# Patient Record
Sex: FEMALE | Race: WHITE | Employment: OTHER | URBAN - METROPOLITAN AREA
[De-identification: names, ages, dates, MRNs, and addresses within clinical notes are randomized per-mention and may not be internally consistent; named-entity substitution may affect disease eponyms.]

---

## 2017-02-18 ENCOUNTER — HOSPITAL ENCOUNTER (EMERGENCY)
Age: 72
Discharge: HOME | End: 2017-02-18
Attending: FAMILY MEDICINE
Payer: MEDICARE

## 2017-02-18 ENCOUNTER — APPOINTMENT (OUTPATIENT)
Dept: CT IMAGING | Age: 72
End: 2017-02-18
Payer: MEDICARE

## 2017-02-18 VITALS
WEIGHT: 200 LBS | HEIGHT: 66 IN | OXYGEN SATURATION: 97 % | SYSTOLIC BLOOD PRESSURE: 134 MMHG | TEMPERATURE: 98 F | HEART RATE: 96 BPM | DIASTOLIC BLOOD PRESSURE: 78 MMHG | RESPIRATION RATE: 20 BRPM | BODY MASS INDEX: 32.14 KG/M2

## 2017-02-18 DIAGNOSIS — K29.00 ACUTE GASTRITIS WITHOUT HEMORRHAGE, UNSPECIFIED GASTRITIS TYPE: Primary | ICD-10-CM

## 2017-02-18 LAB
ALBUMIN SERPL-MCNC: 4.4 G/DL (ref 3.9–4.9)
ALP BLD-CCNC: 119 U/L (ref 40–130)
ALT SERPL-CCNC: 30 U/L (ref 0–33)
ANION GAP SERPL CALCULATED.3IONS-SCNC: 14 MEQ/L (ref 7–13)
AST SERPL-CCNC: 35 U/L (ref 0–35)
BASOPHILS ABSOLUTE: 0 K/UL (ref 0–0.2)
BASOPHILS RELATIVE PERCENT: 0.6 %
BILIRUB SERPL-MCNC: 0.3 MG/DL (ref 0–1.2)
BUN BLDV-MCNC: 21 MG/DL (ref 8–23)
CALCIUM SERPL-MCNC: 9.4 MG/DL (ref 8.6–10.2)
CHLORIDE BLD-SCNC: 100 MEQ/L (ref 98–107)
CO2: 23 MEQ/L (ref 22–29)
CREAT SERPL-MCNC: 0.85 MG/DL (ref 0.5–0.9)
EOSINOPHILS ABSOLUTE: 0.1 K/UL (ref 0–0.7)
EOSINOPHILS RELATIVE PERCENT: 1.1 %
GFR AFRICAN AMERICAN: >60
GFR NON-AFRICAN AMERICAN: >60
GLOBULIN: 2.8 G/DL (ref 2.3–3.5)
GLUCOSE BLD-MCNC: 111 MG/DL (ref 74–109)
HCT VFR BLD CALC: 42.9 % (ref 37–47)
HEMOGLOBIN: 14.5 G/DL (ref 12–16)
LIPASE: 13 U/L (ref 13–60)
LYMPHOCYTES ABSOLUTE: 1.1 K/UL (ref 1–4.8)
LYMPHOCYTES RELATIVE PERCENT: 21.5 %
MCH RBC QN AUTO: 30.4 PG (ref 27–31.3)
MCHC RBC AUTO-ENTMCNC: 33.8 % (ref 33–37)
MCV RBC AUTO: 90 FL (ref 82–100)
MONOCYTES ABSOLUTE: 1 K/UL (ref 0.2–0.8)
MONOCYTES RELATIVE PERCENT: 19.6 %
NEUTROPHILS ABSOLUTE: 2.8 K/UL (ref 1.4–6.5)
NEUTROPHILS RELATIVE PERCENT: 57.2 %
PDW BLD-RTO: 13.2 % (ref 11.5–14.5)
PLATELET # BLD: 180 K/UL (ref 130–400)
POTASSIUM SERPL-SCNC: 3.6 MEQ/L (ref 3.5–5.1)
RBC # BLD: 4.77 M/UL (ref 4.2–5.4)
SODIUM BLD-SCNC: 137 MEQ/L (ref 132–144)
TOTAL PROTEIN: 7.2 G/DL (ref 6.4–8.1)
WBC # BLD: 5 K/UL (ref 4.8–10.8)

## 2017-02-18 PROCEDURE — 93005 ELECTROCARDIOGRAM TRACING: CPT

## 2017-02-18 PROCEDURE — 71275 CT ANGIOGRAPHY CHEST: CPT

## 2017-02-18 PROCEDURE — 6370000000 HC RX 637 (ALT 250 FOR IP): Performed by: FAMILY MEDICINE

## 2017-02-18 PROCEDURE — 80053 COMPREHEN METABOLIC PANEL: CPT

## 2017-02-18 PROCEDURE — 96374 THER/PROPH/DIAG INJ IV PUSH: CPT

## 2017-02-18 PROCEDURE — 83690 ASSAY OF LIPASE: CPT

## 2017-02-18 PROCEDURE — 36415 COLL VENOUS BLD VENIPUNCTURE: CPT

## 2017-02-18 PROCEDURE — 6360000002 HC RX W HCPCS: Performed by: FAMILY MEDICINE

## 2017-02-18 PROCEDURE — 6360000004 HC RX CONTRAST MEDICATION: Performed by: RADIOLOGY

## 2017-02-18 PROCEDURE — 2580000003 HC RX 258: Performed by: FAMILY MEDICINE

## 2017-02-18 PROCEDURE — 99284 EMERGENCY DEPT VISIT MOD MDM: CPT

## 2017-02-18 PROCEDURE — 85025 COMPLETE CBC W/AUTO DIFF WBC: CPT

## 2017-02-18 RX ORDER — MECLIZINE HYDROCHLORIDE 25 MG/1
25 TABLET ORAL 3 TIMES DAILY PRN
COMMUNITY

## 2017-02-18 RX ORDER — SIMVASTATIN 40 MG
40 TABLET ORAL NIGHTLY
COMMUNITY
End: 2018-05-29 | Stop reason: ALTCHOICE

## 2017-02-18 RX ORDER — ASPIRIN 81 MG/1
81 TABLET ORAL DAILY
COMMUNITY

## 2017-02-18 RX ORDER — ATENOLOL 25 MG/1
25 TABLET ORAL DAILY
COMMUNITY

## 2017-02-18 RX ORDER — ONDANSETRON 2 MG/ML
4 INJECTION INTRAMUSCULAR; INTRAVENOUS ONCE
Status: COMPLETED | OUTPATIENT
Start: 2017-02-18 | End: 2017-02-18

## 2017-02-18 RX ORDER — PANTOPRAZOLE SODIUM 40 MG/1
40 TABLET, DELAYED RELEASE ORAL ONCE
Status: COMPLETED | OUTPATIENT
Start: 2017-02-18 | End: 2017-02-18

## 2017-02-18 RX ORDER — ONDANSETRON 4 MG/1
4 TABLET, FILM COATED ORAL EVERY 8 HOURS PRN
Qty: 20 TABLET | Refills: 0 | Status: SHIPPED | OUTPATIENT
Start: 2017-02-18

## 2017-02-18 RX ORDER — TRIAMTERENE AND HYDROCHLOROTHIAZIDE 37.5; 25 MG/1; MG/1
1 TABLET ORAL PRN
COMMUNITY

## 2017-02-18 RX ORDER — 0.9 % SODIUM CHLORIDE 0.9 %
1000 INTRAVENOUS SOLUTION INTRAVENOUS ONCE
Status: COMPLETED | OUTPATIENT
Start: 2017-02-18 | End: 2017-02-18

## 2017-02-18 RX ADMIN — PANTOPRAZOLE SODIUM 40 MG: 40 TABLET, DELAYED RELEASE ORAL at 12:13

## 2017-02-18 RX ADMIN — SODIUM CHLORIDE 1000 ML: 9 INJECTION, SOLUTION INTRAVENOUS at 12:13

## 2017-02-18 RX ADMIN — ONDANSETRON 4 MG: 2 INJECTION, SOLUTION INTRAMUSCULAR; INTRAVENOUS at 12:13

## 2017-02-18 RX ADMIN — IOPAMIDOL 100 ML: 612 INJECTION, SOLUTION INTRAVENOUS at 13:40

## 2017-02-18 RX ADMIN — Medication 30 ML: at 13:11

## 2017-02-20 LAB
EKG ATRIAL RATE: 101 BPM
EKG P AXIS: 46 DEGREES
EKG P-R INTERVAL: 138 MS
EKG Q-T INTERVAL: 352 MS
EKG QRS DURATION: 78 MS
EKG QTC CALCULATION (BAZETT): 456 MS
EKG R AXIS: -8 DEGREES
EKG T AXIS: 76 DEGREES
EKG VENTRICULAR RATE: 101 BPM

## 2017-06-06 ENCOUNTER — HOSPITAL ENCOUNTER (OUTPATIENT)
Dept: WOMENS IMAGING | Age: 72
Discharge: HOME | End: 2017-06-06
Payer: MEDICARE

## 2017-06-06 DIAGNOSIS — Z13.9 SCREENING: ICD-10-CM

## 2017-06-06 PROCEDURE — 77063 BREAST TOMOSYNTHESIS BI: CPT

## 2017-06-23 ENCOUNTER — HOSPITAL ENCOUNTER (OUTPATIENT)
Dept: CT IMAGING | Age: 72
Discharge: HOME | End: 2017-06-23
Payer: MEDICARE

## 2017-06-23 VITALS
HEART RATE: 99 BPM | DIASTOLIC BLOOD PRESSURE: 79 MMHG | SYSTOLIC BLOOD PRESSURE: 136 MMHG | HEIGHT: 66 IN | BODY MASS INDEX: 33.91 KG/M2 | WEIGHT: 211 LBS | RESPIRATION RATE: 16 BRPM

## 2017-06-23 DIAGNOSIS — R04.2 HEMOPTYSIS: ICD-10-CM

## 2017-06-23 DIAGNOSIS — R06.02 SOB (SHORTNESS OF BREATH): ICD-10-CM

## 2017-06-23 DIAGNOSIS — R07.9 CHEST PAIN, UNSPECIFIED TYPE: ICD-10-CM

## 2017-06-23 PROCEDURE — 71250 CT THORAX DX C-: CPT

## 2017-08-15 ENCOUNTER — OFFICE VISIT (OUTPATIENT)
Dept: PULMONOLOGY | Age: 72
End: 2017-08-15

## 2017-08-15 VITALS
DIASTOLIC BLOOD PRESSURE: 76 MMHG | TEMPERATURE: 98.1 F | RESPIRATION RATE: 14 BRPM | WEIGHT: 215 LBS | HEART RATE: 91 BPM | BODY MASS INDEX: 34.55 KG/M2 | OXYGEN SATURATION: 97 % | SYSTOLIC BLOOD PRESSURE: 128 MMHG | HEIGHT: 66 IN

## 2017-08-15 DIAGNOSIS — G47.33 OBSTRUCTIVE SLEEP APNEA: Primary | ICD-10-CM

## 2017-08-15 DIAGNOSIS — R06.02 SHORTNESS OF BREATH: ICD-10-CM

## 2017-08-15 DIAGNOSIS — K21.9 GASTROESOPHAGEAL REFLUX DISEASE, ESOPHAGITIS PRESENCE NOT SPECIFIED: ICD-10-CM

## 2017-08-15 PROCEDURE — 99204 OFFICE O/P NEW MOD 45 MIN: CPT | Performed by: INTERNAL MEDICINE

## 2017-08-15 RX ORDER — ALPRAZOLAM 0.25 MG/1
0.25 TABLET ORAL 2 TIMES DAILY PRN
COMMUNITY
Start: 2017-07-14

## 2017-08-15 RX ORDER — NITROGLYCERIN 0.4 MG/1
TABLET SUBLINGUAL
COMMUNITY
Start: 2017-05-31

## 2017-08-15 RX ORDER — RANITIDINE 300 MG/1
300 TABLET ORAL NIGHTLY
Qty: 30 TABLET | Refills: 5 | Status: SHIPPED | OUTPATIENT
Start: 2017-08-15 | End: 2017-08-15 | Stop reason: SDUPTHER

## 2017-08-15 RX ORDER — OMEPRAZOLE 20 MG/1
20 CAPSULE, DELAYED RELEASE ORAL DAILY
COMMUNITY
Start: 2017-06-01

## 2017-08-15 RX ORDER — ESCITALOPRAM OXALATE 10 MG/1
10 TABLET ORAL DAILY
COMMUNITY
Start: 2017-07-14

## 2017-08-15 RX ORDER — RANITIDINE 300 MG/1
300 TABLET ORAL NIGHTLY
Qty: 30 TABLET | Refills: 5 | Status: SHIPPED | OUTPATIENT
Start: 2017-08-15 | End: 2017-12-10 | Stop reason: SDUPTHER

## 2017-08-15 ASSESSMENT — ENCOUNTER SYMPTOMS
SINUS PRESSURE: 0
WHEEZING: 0
ABDOMINAL PAIN: 0
VOMITING: 0
NAUSEA: 1
COUGH: 0
SHORTNESS OF BREATH: 1
SORE THROAT: 0
CHEST TIGHTNESS: 0
DIARRHEA: 0
RHINORRHEA: 0

## 2017-08-29 ENCOUNTER — HOSPITAL ENCOUNTER (OUTPATIENT)
Dept: PULMONOLOGY | Age: 72
Discharge: HOME | End: 2017-08-29
Payer: MEDICARE

## 2017-08-29 DIAGNOSIS — R06.02 SHORTNESS OF BREATH: ICD-10-CM

## 2017-08-29 PROCEDURE — 6360000002 HC RX W HCPCS: Performed by: INTERNAL MEDICINE

## 2017-08-29 PROCEDURE — 94060 EVALUATION OF WHEEZING: CPT

## 2017-08-29 PROCEDURE — 94729 DIFFUSING CAPACITY: CPT

## 2017-08-29 PROCEDURE — 94726 PLETHYSMOGRAPHY LUNG VOLUMES: CPT

## 2017-08-29 RX ADMIN — ALBUTEROL SULFATE 2.5 MG: 2.5 SOLUTION RESPIRATORY (INHALATION) at 15:41

## 2017-08-30 RX ORDER — ALBUTEROL SULFATE 2.5 MG/3ML
2.5 SOLUTION RESPIRATORY (INHALATION) ONCE
Status: COMPLETED | OUTPATIENT
Start: 2017-08-30 | End: 2017-08-29

## 2017-09-01 ENCOUNTER — HOSPITAL ENCOUNTER (OUTPATIENT)
Dept: SLEEP CENTER | Age: 72
Discharge: HOME | End: 2017-09-01
Payer: MEDICARE

## 2017-09-01 PROCEDURE — 95810 POLYSOM 6/> YRS 4/> PARAM: CPT

## 2017-09-05 PROCEDURE — 94726 PLETHYSMOGRAPHY LUNG VOLUMES: CPT | Performed by: INTERNAL MEDICINE

## 2017-09-05 PROCEDURE — 94060 EVALUATION OF WHEEZING: CPT | Performed by: INTERNAL MEDICINE

## 2017-09-05 PROCEDURE — 94729 DIFFUSING CAPACITY: CPT | Performed by: INTERNAL MEDICINE

## 2017-09-14 DIAGNOSIS — G47.33 OBSTRUCTIVE SLEEP APNEA: ICD-10-CM

## 2017-09-15 ENCOUNTER — HOSPITAL ENCOUNTER (OUTPATIENT)
Dept: SLEEP CENTER | Age: 72
Discharge: HOME | End: 2017-09-15
Payer: MEDICARE

## 2017-09-15 PROCEDURE — 95811 POLYSOM 6/>YRS CPAP 4/> PARM: CPT

## 2017-09-28 ENCOUNTER — OFFICE VISIT (OUTPATIENT)
Dept: PULMONOLOGY | Age: 72
End: 2017-09-28

## 2017-09-28 VITALS
HEART RATE: 80 BPM | DIASTOLIC BLOOD PRESSURE: 88 MMHG | WEIGHT: 215 LBS | TEMPERATURE: 97.6 F | HEIGHT: 66 IN | OXYGEN SATURATION: 98 % | BODY MASS INDEX: 34.55 KG/M2 | RESPIRATION RATE: 18 BRPM | SYSTOLIC BLOOD PRESSURE: 134 MMHG

## 2017-09-28 DIAGNOSIS — G47.33 OBSTRUCTIVE SLEEP APNEA: Primary | ICD-10-CM

## 2017-09-28 DIAGNOSIS — K21.9 GASTROESOPHAGEAL REFLUX DISEASE, ESOPHAGITIS PRESENCE NOT SPECIFIED: ICD-10-CM

## 2017-09-28 PROCEDURE — G8399 PT W/DXA RESULTS DOCUMENT: HCPCS | Performed by: INTERNAL MEDICINE

## 2017-09-28 PROCEDURE — 1123F ACP DISCUSS/DSCN MKR DOCD: CPT | Performed by: INTERNAL MEDICINE

## 2017-09-28 PROCEDURE — 4040F PNEUMOC VAC/ADMIN/RCVD: CPT | Performed by: INTERNAL MEDICINE

## 2017-09-28 PROCEDURE — 99213 OFFICE O/P EST LOW 20 MIN: CPT | Performed by: INTERNAL MEDICINE

## 2017-09-28 PROCEDURE — G8417 CALC BMI ABV UP PARAM F/U: HCPCS | Performed by: INTERNAL MEDICINE

## 2017-09-28 PROCEDURE — G8427 DOCREV CUR MEDS BY ELIG CLIN: HCPCS | Performed by: INTERNAL MEDICINE

## 2017-09-28 PROCEDURE — 1036F TOBACCO NON-USER: CPT | Performed by: INTERNAL MEDICINE

## 2017-09-28 PROCEDURE — 3014F SCREEN MAMMO DOC REV: CPT | Performed by: INTERNAL MEDICINE

## 2017-09-28 PROCEDURE — 1090F PRES/ABSN URINE INCON ASSESS: CPT | Performed by: INTERNAL MEDICINE

## 2017-09-28 PROCEDURE — 3017F COLORECTAL CA SCREEN DOC REV: CPT | Performed by: INTERNAL MEDICINE

## 2017-09-28 ASSESSMENT — ENCOUNTER SYMPTOMS
COUGH: 0
WHEEZING: 0
SINUS PRESSURE: 0
VOMITING: 0
ABDOMINAL PAIN: 0
RHINORRHEA: 0
SHORTNESS OF BREATH: 1
DIARRHEA: 0
NAUSEA: 0
CHEST TIGHTNESS: 1
SORE THROAT: 0

## 2017-09-28 NOTE — PROGRESS NOTES
pain, palpitations and leg swelling. Gastrointestinal: Negative for abdominal pain, diarrhea, nausea and vomiting. Genitourinary: Negative for dysuria and urgency. Musculoskeletal: Negative for arthralgias, joint swelling and myalgias. Skin: Negative for rash. Allergic/Immunologic: Negative for environmental allergies. Neurological: Negative for tremors, weakness, light-headedness and headaches. Psychiatric/Behavioral: Negative for behavioral problems and sleep disturbance. Objective:     Vitals:    09/28/17 1531   BP: 134/88   Site: Right Arm   Position: Sitting   Cuff Size: Medium Adult   Pulse: 80   Resp: 18   Temp: 97.6 °F (36.4 °C)   TempSrc: Tympanic   SpO2: 98%   Weight: 215 lb (97.5 kg)   Height: 5' 5.5\" (1.664 m)         Physical Exam   Constitutional: She is oriented to person, place, and time. She appears well-developed and well-nourished. HENT:   Head: Normocephalic and atraumatic. Mouth/Throat: Oropharynx is clear and moist.   Eyes: EOM are normal. Pupils are equal, round, and reactive to light. Neck: No JVD present. No tracheal deviation present. No thyromegaly present. Cardiovascular: Normal rate and regular rhythm. Exam reveals no gallop. No murmur heard. Pulmonary/Chest: She has no wheezes. She has no rales. She exhibits no tenderness. Abdominal: She exhibits no distension. Musculoskeletal: Normal range of motion. She exhibits no edema. Neurological: She is alert and oriented to person, place, and time. Coordination normal.   Skin: Skin is warm and dry. No rash noted. Psychiatric: She has a normal mood and affect. Assessment:     1. Obstructive sleep apnea     2. Gastroesophageal reflux disease, esophagitis presence not specified       The results of the study were discussed with patient today at length.   She was encouraged to start using her CPAP regularly, work on losing some weight and report any difficulties to me otherwise I'll see her for follow-up in 2 months      Plan:     No orders of the defined types were placed in this encounter. No orders of the defined types were placed in this encounter. Return in about 2 months (around 11/28/2017) for after starting CPAP.       Bowen Cochran MD

## 2017-12-04 ENCOUNTER — OFFICE VISIT (OUTPATIENT)
Dept: PULMONOLOGY | Age: 72
End: 2017-12-04

## 2017-12-04 VITALS
SYSTOLIC BLOOD PRESSURE: 128 MMHG | HEIGHT: 66 IN | OXYGEN SATURATION: 98 % | WEIGHT: 218.6 LBS | HEART RATE: 71 BPM | TEMPERATURE: 96.9 F | BODY MASS INDEX: 35.13 KG/M2 | DIASTOLIC BLOOD PRESSURE: 82 MMHG

## 2017-12-04 DIAGNOSIS — G47.33 OBSTRUCTIVE SLEEP APNEA: Primary | ICD-10-CM

## 2017-12-04 DIAGNOSIS — K21.9 GASTROESOPHAGEAL REFLUX DISEASE, ESOPHAGITIS PRESENCE NOT SPECIFIED: ICD-10-CM

## 2017-12-04 PROCEDURE — G8417 CALC BMI ABV UP PARAM F/U: HCPCS | Performed by: INTERNAL MEDICINE

## 2017-12-04 PROCEDURE — G8427 DOCREV CUR MEDS BY ELIG CLIN: HCPCS | Performed by: INTERNAL MEDICINE

## 2017-12-04 PROCEDURE — 4040F PNEUMOC VAC/ADMIN/RCVD: CPT | Performed by: INTERNAL MEDICINE

## 2017-12-04 PROCEDURE — 1123F ACP DISCUSS/DSCN MKR DOCD: CPT | Performed by: INTERNAL MEDICINE

## 2017-12-04 PROCEDURE — G8484 FLU IMMUNIZE NO ADMIN: HCPCS | Performed by: INTERNAL MEDICINE

## 2017-12-04 PROCEDURE — 3017F COLORECTAL CA SCREEN DOC REV: CPT | Performed by: INTERNAL MEDICINE

## 2017-12-04 PROCEDURE — G8399 PT W/DXA RESULTS DOCUMENT: HCPCS | Performed by: INTERNAL MEDICINE

## 2017-12-04 PROCEDURE — 3014F SCREEN MAMMO DOC REV: CPT | Performed by: INTERNAL MEDICINE

## 2017-12-04 PROCEDURE — 99213 OFFICE O/P EST LOW 20 MIN: CPT | Performed by: INTERNAL MEDICINE

## 2017-12-04 PROCEDURE — 1036F TOBACCO NON-USER: CPT | Performed by: INTERNAL MEDICINE

## 2017-12-04 PROCEDURE — 1090F PRES/ABSN URINE INCON ASSESS: CPT | Performed by: INTERNAL MEDICINE

## 2017-12-04 ASSESSMENT — ENCOUNTER SYMPTOMS
NAUSEA: 0
COUGH: 0
WHEEZING: 0
ABDOMINAL PAIN: 0
SORE THROAT: 0
RHINORRHEA: 0
SHORTNESS OF BREATH: 0
CHEST TIGHTNESS: 0
SINUS PRESSURE: 0
VOMITING: 0
DIARRHEA: 0

## 2017-12-04 NOTE — PROGRESS NOTES
Subjective:     Geetha Campos is a 67 y.o. female who complains today of:     Chief Complaint   Patient presents with    Sleep Apnea     2 month follow up       HPI  Patient is here for a follow-up visit regarding obstructive sleep apnea and GERD. Since the last visit patient reports no significant issues or problems she is doing very well using her CPAP regularly she states that she did not have much trouble getting used to it. She is sleeping through the night averaging 6-7 hours of sleep now. She reports no significant cough or increased shortness breath lately. Allergies:  Review of patient's allergies indicates no known allergies. Past Medical History:   Diagnosis Date    Hyperlipidemia     Meniere's disease     CARLOS (obstructive sleep apnea)      Past Surgical History:   Procedure Laterality Date    BREAST LUMPECTOMY      CHOLECYSTECTOMY      COLONOSCOPY  12/21/15    w/polypectomy     HYSTERECTOMY       Family History   Problem Relation Age of Onset    Brain Cancer Father     Colon Cancer Maternal Grandfather      Social History     Social History    Marital status:      Spouse name: N/A    Number of children: N/A    Years of education: N/A     Occupational History    Not on file. Social History Main Topics    Smoking status: Never Smoker    Smokeless tobacco: Never Used    Alcohol use No    Drug use: No    Sexual activity: Not on file     Other Topics Concern    Not on file     Social History Narrative    No narrative on file         Review of Systems   Constitutional: Negative for appetite change, chills, diaphoresis, fatigue and fever. HENT: Negative for congestion, nosebleeds, postnasal drip, rhinorrhea, sinus pressure, sneezing and sore throat. Eyes: Negative for visual disturbance. Respiratory: Negative for cough, chest tightness, shortness of breath and wheezing. Cardiovascular: Negative for chest pain, palpitations and leg swelling. Gastrointestinal: Negative for abdominal pain, diarrhea, nausea and vomiting. Genitourinary: Negative for dysuria and urgency. Musculoskeletal: Negative for arthralgias, joint swelling and myalgias. Skin: Negative for rash. Allergic/Immunologic: Negative for environmental allergies. Neurological: Negative for tremors, weakness, light-headedness and headaches. Psychiatric/Behavioral: Negative for behavioral problems and sleep disturbance. Objective:     Vitals:    12/04/17 1349   BP: 128/82   Site: Right Arm   Position: Sitting   Cuff Size: Large Adult   Pulse: 71   Temp: 96.9 °F (36.1 °C)   TempSrc: Tympanic   SpO2: 98%   Weight: 218 lb 9.6 oz (99.2 kg)   Height: 5' 6\" (1.676 m)         Physical Exam   Constitutional: She is oriented to person, place, and time. She appears well-developed and well-nourished. HENT:   Head: Normocephalic and atraumatic. Mouth/Throat: Oropharynx is clear and moist.   Eyes: EOM are normal. Pupils are equal, round, and reactive to light. Neck: No JVD present. No tracheal deviation present. No thyromegaly present. Cardiovascular: Normal rate and regular rhythm. Exam reveals no gallop. No murmur heard. Pulmonary/Chest: She has no wheezes. She has no rales. She exhibits no tenderness. Abdominal: She exhibits no distension. Musculoskeletal: Normal range of motion. She exhibits no edema. Neurological: She is alert and oriented to person, place, and time. Coordination normal.   Skin: Skin is warm and dry. No rash noted. Psychiatric: She has a normal mood and affect. Assessment:     1. Obstructive sleep apnea     2.  Gastroesophageal reflux disease, esophagitis presence not specified       The diagnosis of sleep apnea, the importance of weight loss and wasting help her lose weight were discussed today at length in addition she was advised to continue regular use of CPAP and I'll see her for follow-up in 1 year and as needed      Plan:     No

## 2017-12-10 DIAGNOSIS — K21.9 GASTROESOPHAGEAL REFLUX DISEASE, ESOPHAGITIS PRESENCE NOT SPECIFIED: ICD-10-CM

## 2017-12-11 RX ORDER — RANITIDINE 300 MG/1
TABLET ORAL
Qty: 90 TABLET | Refills: 5 | Status: SHIPPED | OUTPATIENT
Start: 2017-12-11 | End: 2019-07-09 | Stop reason: SDUPTHER

## 2018-03-06 ENCOUNTER — HOSPITAL ENCOUNTER (EMERGENCY)
Age: 73
Discharge: HOME | End: 2018-03-06
Attending: EMERGENCY MEDICINE
Payer: MEDICARE

## 2018-03-06 ENCOUNTER — APPOINTMENT (OUTPATIENT)
Dept: CT IMAGING | Age: 73
End: 2018-03-06
Payer: MEDICARE

## 2018-03-06 VITALS
RESPIRATION RATE: 17 BRPM | SYSTOLIC BLOOD PRESSURE: 143 MMHG | WEIGHT: 210 LBS | DIASTOLIC BLOOD PRESSURE: 82 MMHG | HEIGHT: 66 IN | HEART RATE: 70 BPM | TEMPERATURE: 97.8 F | BODY MASS INDEX: 33.75 KG/M2 | OXYGEN SATURATION: 95 %

## 2018-03-06 DIAGNOSIS — R06.00 DYSPNEA, UNSPECIFIED TYPE: Primary | ICD-10-CM

## 2018-03-06 DIAGNOSIS — I27.20 PULMONARY HYPERTENSION (HCC): ICD-10-CM

## 2018-03-06 LAB
ALBUMIN SERPL-MCNC: 4.2 G/DL (ref 3.9–4.9)
ALP BLD-CCNC: 108 U/L (ref 40–130)
ALT SERPL-CCNC: 17 U/L (ref 0–33)
ANION GAP SERPL CALCULATED.3IONS-SCNC: 13 MEQ/L (ref 7–13)
AST SERPL-CCNC: 17 U/L (ref 0–35)
BASOPHILS ABSOLUTE: 0.1 K/UL (ref 0–0.2)
BASOPHILS RELATIVE PERCENT: 1 %
BILIRUB SERPL-MCNC: 0.3 MG/DL (ref 0–1.2)
BUN BLDV-MCNC: 22 MG/DL (ref 8–23)
CALCIUM SERPL-MCNC: 8.8 MG/DL (ref 8.6–10.2)
CHLORIDE BLD-SCNC: 103 MEQ/L (ref 98–107)
CO2: 23 MEQ/L (ref 22–29)
CREAT SERPL-MCNC: 0.58 MG/DL (ref 0.5–0.9)
EKG ATRIAL RATE: 87 BPM
EKG P AXIS: 29 DEGREES
EKG P-R INTERVAL: 150 MS
EKG Q-T INTERVAL: 372 MS
EKG QRS DURATION: 74 MS
EKG QTC CALCULATION (BAZETT): 447 MS
EKG R AXIS: -4 DEGREES
EKG T AXIS: 54 DEGREES
EKG VENTRICULAR RATE: 87 BPM
EOSINOPHILS ABSOLUTE: 0 K/UL (ref 0–0.7)
EOSINOPHILS RELATIVE PERCENT: 0.9 %
GFR AFRICAN AMERICAN: >60
GFR NON-AFRICAN AMERICAN: >60
GLOBULIN: 2.3 G/DL (ref 2.3–3.5)
GLUCOSE BLD-MCNC: 101 MG/DL (ref 74–109)
HCT VFR BLD CALC: 40.6 % (ref 37–47)
HEMOGLOBIN: 13.5 G/DL (ref 12–16)
LYMPHOCYTES ABSOLUTE: 1.5 K/UL (ref 1–4.8)
LYMPHOCYTES RELATIVE PERCENT: 25.9 %
MAGNESIUM: 2.1 MG/DL (ref 1.7–2.3)
MCH RBC QN AUTO: 30.2 PG (ref 27–31.3)
MCHC RBC AUTO-ENTMCNC: 33.3 % (ref 33–37)
MCV RBC AUTO: 90.8 FL (ref 82–100)
MONOCYTES ABSOLUTE: 0.6 K/UL (ref 0.2–0.8)
MONOCYTES RELATIVE PERCENT: 10 %
NEUTROPHILS ABSOLUTE: 3.5 K/UL (ref 1.4–6.5)
NEUTROPHILS RELATIVE PERCENT: 62.2 %
PDW BLD-RTO: 14 % (ref 11.5–14.5)
PLATELET # BLD: 212 K/UL (ref 130–400)
POTASSIUM SERPL-SCNC: 4.2 MEQ/L (ref 3.5–5.1)
RBC # BLD: 4.47 M/UL (ref 4.2–5.4)
SODIUM BLD-SCNC: 139 MEQ/L (ref 132–144)
TOTAL PROTEIN: 6.5 G/DL (ref 6.4–8.1)
TROPONIN: <0.01 NG/ML (ref 0–0.01)
WBC # BLD: 5.6 K/UL (ref 4.8–10.8)

## 2018-03-06 PROCEDURE — 83735 ASSAY OF MAGNESIUM: CPT

## 2018-03-06 PROCEDURE — 99285 EMERGENCY DEPT VISIT HI MDM: CPT

## 2018-03-06 PROCEDURE — 6360000004 HC RX CONTRAST MEDICATION: Performed by: EMERGENCY MEDICINE

## 2018-03-06 PROCEDURE — 80053 COMPREHEN METABOLIC PANEL: CPT

## 2018-03-06 PROCEDURE — 2580000003 HC RX 258: Performed by: EMERGENCY MEDICINE

## 2018-03-06 PROCEDURE — 71275 CT ANGIOGRAPHY CHEST: CPT

## 2018-03-06 PROCEDURE — 36415 COLL VENOUS BLD VENIPUNCTURE: CPT

## 2018-03-06 PROCEDURE — 84484 ASSAY OF TROPONIN QUANT: CPT

## 2018-03-06 PROCEDURE — 93005 ELECTROCARDIOGRAM TRACING: CPT

## 2018-03-06 PROCEDURE — 85025 COMPLETE CBC W/AUTO DIFF WBC: CPT

## 2018-03-06 RX ORDER — SODIUM CHLORIDE 0.9 % (FLUSH) 0.9 %
10 SYRINGE (ML) INJECTION ONCE
Status: DISCONTINUED | OUTPATIENT
Start: 2018-03-06 | End: 2018-03-06 | Stop reason: HOSPADM

## 2018-03-06 RX ORDER — ATORVASTATIN CALCIUM 80 MG/1
80 TABLET, FILM COATED ORAL DAILY
COMMUNITY

## 2018-03-06 RX ORDER — 0.9 % SODIUM CHLORIDE 0.9 %
1000 INTRAVENOUS SOLUTION INTRAVENOUS ONCE
Status: COMPLETED | OUTPATIENT
Start: 2018-03-06 | End: 2018-03-06

## 2018-03-06 RX ADMIN — IOPAMIDOL 100 ML: 755 INJECTION, SOLUTION INTRAVENOUS at 13:48

## 2018-03-06 RX ADMIN — SODIUM CHLORIDE 1000 ML: 9 INJECTION, SOLUTION INTRAVENOUS at 13:06

## 2018-03-06 ASSESSMENT — ENCOUNTER SYMPTOMS
ABDOMINAL PAIN: 0
DIARRHEA: 0
SORE THROAT: 0
NAUSEA: 0
VOMITING: 0
COUGH: 0
BACK PAIN: 0
SHORTNESS OF BREATH: 0

## 2018-03-06 ASSESSMENT — PAIN DESCRIPTION - LOCATION: LOCATION: CHEST

## 2018-03-06 ASSESSMENT — PAIN DESCRIPTION - DESCRIPTORS: DESCRIPTORS: DULL

## 2018-03-06 ASSESSMENT — PAIN SCALES - GENERAL: PAINLEVEL_OUTOF10: 3

## 2018-03-06 NOTE — ED PROVIDER NOTES
[address] [facility] ED  eMERGENCY dEPARTMENT eNCOUnter      Pt Name: Lata Dangelo  MRN: [id number]  Britneygfdania 1945  Date of evaluation: 3/6/2018  Provider: Sonya Regalado MD    CHIEF COMPLAINT           HPI  Lata Dangelo is a 67 y.o. female per chart review has a h/o hpl, CARLOS, recently diagnosed pulm HTN presents to the ED with sob. Pt notes gradual onset, moderate, constant sob x 2 days. Occurs at rest and wore with exertion. No fever, n/v, cp, ab pain, dysuria, diarrhea. ROS  Review of Systems   Constitutional: Negative for activity change, chills and fever. HENT: Negative for ear pain and sore throat. Eyes: Negative for visual disturbance. Respiratory: Negative for cough and shortness of breath. Cardiovascular: Negative for chest pain, palpitations and leg swelling. Gastrointestinal: Negative for abdominal pain, diarrhea, nausea and vomiting. Genitourinary: Negative for dysuria. Musculoskeletal: Negative for back pain. Skin: Negative for rash. Neurological: Negative for dizziness and weakness. Except as noted above the remainder of the review of systems was reviewed and negative. PAST MEDICAL HISTORY     Past Medical History:   Diagnosis Date    Anxiety     Depression     Hyperlipidemia     Meniere's disease     CARLOS (obstructive sleep apnea)     Pulmonary hypertension          SURGICAL HISTORY       Past Surgical History:   Procedure Laterality Date    BREAST LUMPECTOMY      CHOLECYSTECTOMY      COLONOSCOPY  12/21/15    w/polypectomy     HYSTERECTOMY           CURRENT MEDICATIONS       Previous Medications    ALPRAZOLAM (XANAX) 0.25 MG TABLET    0.25 mg 2 times daily as needed .     ASPIRIN 81 MG EC TABLET    Take 81 mg by mouth daily    ATENOLOL (TENORMIN) 25 MG TABLET    Take 25 mg by mouth daily    ATORVASTATIN (LIPITOR) 80 MG TABLET    Take 80 mg by mouth daily    CPAP MACHINE MISC    by Does not apply route    ESCITALOPRAM (LEXAPRO) 10 MG

## 2018-03-07 LAB
GFR AFRICAN AMERICAN: >60
GFR NON-AFRICAN AMERICAN: >60
PERFORMED ON: NORMAL
POC CREATININE: 0.7 MG/DL (ref 0.6–1.2)
POC SAMPLE TYPE: NORMAL

## 2018-03-13 ENCOUNTER — OFFICE VISIT (OUTPATIENT)
Dept: PULMONOLOGY | Age: 73
End: 2018-03-13
Payer: MEDICARE

## 2018-03-13 VITALS
WEIGHT: 217.8 LBS | DIASTOLIC BLOOD PRESSURE: 78 MMHG | TEMPERATURE: 97.7 F | OXYGEN SATURATION: 93 % | SYSTOLIC BLOOD PRESSURE: 130 MMHG | HEART RATE: 77 BPM | BODY MASS INDEX: 35 KG/M2 | HEIGHT: 66 IN

## 2018-03-13 DIAGNOSIS — R06.02 SHORTNESS OF BREATH: ICD-10-CM

## 2018-03-13 DIAGNOSIS — I27.20 PULMONARY HTN (HCC): ICD-10-CM

## 2018-03-13 DIAGNOSIS — G47.33 OBSTRUCTIVE SLEEP APNEA: Primary | ICD-10-CM

## 2018-03-13 PROCEDURE — 1090F PRES/ABSN URINE INCON ASSESS: CPT | Performed by: INTERNAL MEDICINE

## 2018-03-13 PROCEDURE — G8427 DOCREV CUR MEDS BY ELIG CLIN: HCPCS | Performed by: INTERNAL MEDICINE

## 2018-03-13 PROCEDURE — G8399 PT W/DXA RESULTS DOCUMENT: HCPCS | Performed by: INTERNAL MEDICINE

## 2018-03-13 PROCEDURE — 3017F COLORECTAL CA SCREEN DOC REV: CPT | Performed by: INTERNAL MEDICINE

## 2018-03-13 PROCEDURE — 1123F ACP DISCUSS/DSCN MKR DOCD: CPT | Performed by: INTERNAL MEDICINE

## 2018-03-13 PROCEDURE — G8417 CALC BMI ABV UP PARAM F/U: HCPCS | Performed by: INTERNAL MEDICINE

## 2018-03-13 PROCEDURE — 4040F PNEUMOC VAC/ADMIN/RCVD: CPT | Performed by: INTERNAL MEDICINE

## 2018-03-13 PROCEDURE — 99214 OFFICE O/P EST MOD 30 MIN: CPT | Performed by: INTERNAL MEDICINE

## 2018-03-13 PROCEDURE — 3014F SCREEN MAMMO DOC REV: CPT | Performed by: INTERNAL MEDICINE

## 2018-03-13 PROCEDURE — G8484 FLU IMMUNIZE NO ADMIN: HCPCS | Performed by: INTERNAL MEDICINE

## 2018-03-13 PROCEDURE — 1036F TOBACCO NON-USER: CPT | Performed by: INTERNAL MEDICINE

## 2018-03-13 ASSESSMENT — ENCOUNTER SYMPTOMS
DIARRHEA: 0
VOMITING: 0
CHEST TIGHTNESS: 0
SHORTNESS OF BREATH: 1
WHEEZING: 0
ABDOMINAL PAIN: 0
RHINORRHEA: 0
COUGH: 0
SINUS PRESSURE: 0
NAUSEA: 0
SORE THROAT: 0

## 2018-03-13 NOTE — PROGRESS NOTES
Subjective:     Ezio Brantley is a 67 y.o. female who complains today of:     Chief Complaint   Patient presents with    Results     CT f/u for results from ER       HPI  Patient is here for a follow-up visit regarding sleep apnea and pulmonary hypertension. Recently patient developed significant persistent dyspnea worse than her usual and did not improve with time has she came to the emergency room were CT of the chest was done to rule out pulmonary embolism this was unremarkable with no evidence of pulmonary emboli, parenchymal lung disease or any significant abnormalities. Clinical exam was unremarkable either. Patient improved gradually since then. She does report episodes of dyspnea on exertion off-and-on sometimes provoked by exertion others not. Allergies:  Patient has no known allergies. Past Medical History:   Diagnosis Date    Anxiety     Depression     Hyperlipidemia     Meniere's disease     CARLOS (obstructive sleep apnea)     Pulmonary hypertension      Past Surgical History:   Procedure Laterality Date    BREAST LUMPECTOMY      CHOLECYSTECTOMY      COLONOSCOPY  12/21/15    w/polypectomy     HYSTERECTOMY       Family History   Problem Relation Age of Onset    Brain Cancer Father     Colon Cancer Maternal Grandfather      Social History     Social History    Marital status:      Spouse name: N/A    Number of children: N/A    Years of education: N/A     Occupational History    Not on file. Social History Main Topics    Smoking status: Former Smoker     Quit date: 6/2/1986    Smokeless tobacco: Never Used    Alcohol use No    Drug use: No    Sexual activity: Not on file     Other Topics Concern    Not on file     Social History Narrative    No narrative on file         Review of Systems   Constitutional: Positive for fatigue. Negative for appetite change, chills, diaphoresis and fever.    HENT: Negative for congestion, nosebleeds, postnasal drip, which is likely a combination of being overweight, inadequate activity level, and pulmonary hypertension were discussed. The importance of exercise and weight loss was also discussed. She was encouraged to continue use of CPAP at this point otherwise I'll see her for follow-up in 3 month      Plan:     Orders Placed This Encounter   Procedures    ECHO Complete 2D W Doppler W Color     Standing Status:   Future     Standing Expiration Date:   6/13/2018     Order Specific Question:   Reason for exam:     Answer:   Shortness of breath     No orders of the defined types were placed in this encounter. Return in about 3 months (around 6/13/2018) for re-evaluation, review tests.       Jose Garber MD

## 2018-03-27 ENCOUNTER — HOSPITAL ENCOUNTER (OUTPATIENT)
Dept: NON INVASIVE DIAGNOSTICS | Age: 73
Discharge: HOME | End: 2018-03-27
Payer: MEDICARE

## 2018-03-27 DIAGNOSIS — I27.20 PULMONARY HTN (HCC): ICD-10-CM

## 2018-03-27 LAB
LV EF: 65 %
LVEF MODALITY: NORMAL

## 2018-03-27 PROCEDURE — 93306 TTE W/DOPPLER COMPLETE: CPT

## 2018-05-29 ENCOUNTER — OFFICE VISIT (OUTPATIENT)
Dept: PULMONOLOGY | Age: 73
End: 2018-05-29
Payer: MEDICARE

## 2018-05-29 VITALS
HEART RATE: 99 BPM | TEMPERATURE: 98.3 F | HEIGHT: 66 IN | BODY MASS INDEX: 35.2 KG/M2 | SYSTOLIC BLOOD PRESSURE: 100 MMHG | RESPIRATION RATE: 14 BRPM | WEIGHT: 219 LBS | OXYGEN SATURATION: 96 % | DIASTOLIC BLOOD PRESSURE: 58 MMHG

## 2018-05-29 DIAGNOSIS — G47.33 OBSTRUCTIVE SLEEP APNEA: Primary | ICD-10-CM

## 2018-05-29 DIAGNOSIS — R06.02 SHORTNESS OF BREATH: ICD-10-CM

## 2018-05-29 PROCEDURE — 1036F TOBACCO NON-USER: CPT | Performed by: INTERNAL MEDICINE

## 2018-05-29 PROCEDURE — G8417 CALC BMI ABV UP PARAM F/U: HCPCS | Performed by: INTERNAL MEDICINE

## 2018-05-29 PROCEDURE — 3017F COLORECTAL CA SCREEN DOC REV: CPT | Performed by: INTERNAL MEDICINE

## 2018-05-29 PROCEDURE — G8427 DOCREV CUR MEDS BY ELIG CLIN: HCPCS | Performed by: INTERNAL MEDICINE

## 2018-05-29 PROCEDURE — 1123F ACP DISCUSS/DSCN MKR DOCD: CPT | Performed by: INTERNAL MEDICINE

## 2018-05-29 PROCEDURE — 1101F PT FALLS ASSESS-DOCD LE1/YR: CPT | Performed by: INTERNAL MEDICINE

## 2018-05-29 PROCEDURE — 4040F PNEUMOC VAC/ADMIN/RCVD: CPT | Performed by: INTERNAL MEDICINE

## 2018-05-29 PROCEDURE — 1090F PRES/ABSN URINE INCON ASSESS: CPT | Performed by: INTERNAL MEDICINE

## 2018-05-29 PROCEDURE — 99214 OFFICE O/P EST MOD 30 MIN: CPT | Performed by: INTERNAL MEDICINE

## 2018-05-29 PROCEDURE — G8399 PT W/DXA RESULTS DOCUMENT: HCPCS | Performed by: INTERNAL MEDICINE

## 2018-05-29 ASSESSMENT — ENCOUNTER SYMPTOMS
ABDOMINAL PAIN: 0
CHEST TIGHTNESS: 0
SORE THROAT: 0
VOMITING: 0
SHORTNESS OF BREATH: 1
NAUSEA: 0
COUGH: 0
RHINORRHEA: 0
SINUS PRESSURE: 0
DIARRHEA: 0
WHEEZING: 0

## 2018-05-29 NOTE — PROGRESS NOTES
Subjective:     Antonio Stewart is a 67 y.o. female who complains today of:     Chief Complaint   Patient presents with    Follow-up     three month f/u for ECHO results. HPI  Patient is here for a follow-up visit regarding shortness breath and obstructive sleep apnea. Since the last visit patient underwent an echocardiogram to rule out pulmonary hypertension which came back unremarkable for any LV, RV, dysfunction or pulmonary hypertension. Patient continues with mild exertional dyspnea but otherwise remained stable    Allergies:  Patient has no known allergies. Past Medical History:   Diagnosis Date    Anxiety     Depression     Hyperlipidemia     Meniere's disease     CARLOS (obstructive sleep apnea)     Pulmonary hypertension      Past Surgical History:   Procedure Laterality Date    BREAST LUMPECTOMY      CHOLECYSTECTOMY      COLONOSCOPY  12/21/15    w/polypectomy     HYSTERECTOMY       Family History   Problem Relation Age of Onset    Brain Cancer Father     Colon Cancer Maternal Grandfather      Social History     Social History    Marital status:      Spouse name: N/A    Number of children: N/A    Years of education: N/A     Occupational History    Not on file. Social History Main Topics    Smoking status: Former Smoker     Quit date: 6/2/1986    Smokeless tobacco: Never Used    Alcohol use No    Drug use: No    Sexual activity: Not on file     Other Topics Concern    Not on file     Social History Narrative    No narrative on file         Review of Systems   Constitutional: Negative for appetite change, chills, diaphoresis, fatigue and fever. HENT: Negative for congestion, nosebleeds, postnasal drip, rhinorrhea, sinus pressure, sneezing and sore throat. Eyes: Negative for visual disturbance. Respiratory: Positive for shortness of breath. Negative for cough, chest tightness and wheezing. Cardiovascular: Positive for leg swelling.  Negative for

## 2018-06-27 ENCOUNTER — HOSPITAL ENCOUNTER (OUTPATIENT)
Dept: WOMENS IMAGING | Age: 73
Discharge: HOME | End: 2018-06-29
Payer: MEDICARE

## 2018-06-27 DIAGNOSIS — Z78.0 POST-MENOPAUSAL: ICD-10-CM

## 2018-06-27 DIAGNOSIS — Z12.31 ENCOUNTER FOR SCREENING MAMMOGRAM FOR BREAST CANCER: ICD-10-CM

## 2018-06-27 PROCEDURE — 77063 BREAST TOMOSYNTHESIS BI: CPT

## 2018-06-27 PROCEDURE — 77080 DXA BONE DENSITY AXIAL: CPT

## 2018-10-04 ENCOUNTER — TELEPHONE (OUTPATIENT)
Dept: PULMONOLOGY | Age: 73
End: 2018-10-04

## 2018-10-04 DIAGNOSIS — J44.9 CHRONIC OBSTRUCTIVE PULMONARY DISEASE, UNSPECIFIED COPD TYPE (HCC): Primary | ICD-10-CM

## 2018-11-29 ENCOUNTER — OFFICE VISIT (OUTPATIENT)
Dept: PULMONOLOGY | Age: 73
End: 2018-11-29
Payer: MEDICARE

## 2018-11-29 VITALS
HEART RATE: 99 BPM | TEMPERATURE: 97.8 F | OXYGEN SATURATION: 100 % | HEIGHT: 66 IN | WEIGHT: 218.4 LBS | SYSTOLIC BLOOD PRESSURE: 152 MMHG | BODY MASS INDEX: 35.1 KG/M2 | DIASTOLIC BLOOD PRESSURE: 88 MMHG

## 2018-11-29 DIAGNOSIS — R06.02 SHORTNESS OF BREATH: ICD-10-CM

## 2018-11-29 DIAGNOSIS — I27.20 PULMONARY HTN (HCC): ICD-10-CM

## 2018-11-29 DIAGNOSIS — G47.33 OBSTRUCTIVE SLEEP APNEA: Primary | ICD-10-CM

## 2018-11-29 PROCEDURE — 3017F COLORECTAL CA SCREEN DOC REV: CPT | Performed by: INTERNAL MEDICINE

## 2018-11-29 PROCEDURE — 99214 OFFICE O/P EST MOD 30 MIN: CPT | Performed by: INTERNAL MEDICINE

## 2018-11-29 PROCEDURE — 1101F PT FALLS ASSESS-DOCD LE1/YR: CPT | Performed by: INTERNAL MEDICINE

## 2018-11-29 PROCEDURE — 1090F PRES/ABSN URINE INCON ASSESS: CPT | Performed by: INTERNAL MEDICINE

## 2018-11-29 PROCEDURE — G8417 CALC BMI ABV UP PARAM F/U: HCPCS | Performed by: INTERNAL MEDICINE

## 2018-11-29 PROCEDURE — 4040F PNEUMOC VAC/ADMIN/RCVD: CPT | Performed by: INTERNAL MEDICINE

## 2018-11-29 PROCEDURE — 1036F TOBACCO NON-USER: CPT | Performed by: INTERNAL MEDICINE

## 2018-11-29 PROCEDURE — G8399 PT W/DXA RESULTS DOCUMENT: HCPCS | Performed by: INTERNAL MEDICINE

## 2018-11-29 PROCEDURE — 1123F ACP DISCUSS/DSCN MKR DOCD: CPT | Performed by: INTERNAL MEDICINE

## 2018-11-29 PROCEDURE — G8484 FLU IMMUNIZE NO ADMIN: HCPCS | Performed by: INTERNAL MEDICINE

## 2018-11-29 PROCEDURE — G8427 DOCREV CUR MEDS BY ELIG CLIN: HCPCS | Performed by: INTERNAL MEDICINE

## 2018-11-29 ASSESSMENT — ENCOUNTER SYMPTOMS
WHEEZING: 0
ABDOMINAL PAIN: 0
VOMITING: 0
RHINORRHEA: 0
COUGH: 0
SORE THROAT: 0
NAUSEA: 0
SINUS PRESSURE: 0
CHEST TIGHTNESS: 0
DIARRHEA: 0
SHORTNESS OF BREATH: 1

## 2019-01-08 ENCOUNTER — HOSPITAL ENCOUNTER (OUTPATIENT)
Dept: PULMONOLOGY | Age: 74
Setting detail: THERAPIES SERIES
Discharge: HOME | End: 2019-01-08
Payer: MEDICARE

## 2019-01-08 PROCEDURE — G0237 THERAPEUTIC PROCD STRG ENDUR: HCPCS

## 2019-01-08 PROCEDURE — G0424 PULMONARY REHAB W EXER: HCPCS

## 2019-01-15 ENCOUNTER — HOSPITAL ENCOUNTER (OUTPATIENT)
Dept: PULMONOLOGY | Age: 74
Setting detail: THERAPIES SERIES
Discharge: HOME | End: 2019-01-15
Payer: MEDICARE

## 2019-01-15 PROCEDURE — G0237 THERAPEUTIC PROCD STRG ENDUR: HCPCS

## 2019-01-31 ENCOUNTER — HOSPITAL ENCOUNTER (OUTPATIENT)
Dept: PULMONOLOGY | Age: 74
Setting detail: THERAPIES SERIES
Discharge: HOME | End: 2019-01-31
Payer: MEDICARE

## 2019-01-31 PROCEDURE — G0237 THERAPEUTIC PROCD STRG ENDUR: HCPCS

## 2019-02-05 ENCOUNTER — HOSPITAL ENCOUNTER (OUTPATIENT)
Dept: PULMONOLOGY | Age: 74
Setting detail: THERAPIES SERIES
Discharge: HOME | End: 2019-02-05
Payer: MEDICARE

## 2019-02-05 PROCEDURE — G0237 THERAPEUTIC PROCD STRG ENDUR: HCPCS

## 2019-02-07 ENCOUNTER — HOSPITAL ENCOUNTER (OUTPATIENT)
Dept: PULMONOLOGY | Age: 74
Setting detail: THERAPIES SERIES
Discharge: HOME | End: 2019-02-07
Payer: MEDICARE

## 2019-02-07 PROCEDURE — G0237 THERAPEUTIC PROCD STRG ENDUR: HCPCS

## 2019-02-13 ENCOUNTER — HOSPITAL ENCOUNTER (OUTPATIENT)
Dept: PULMONOLOGY | Age: 74
Setting detail: THERAPIES SERIES
Discharge: HOME | End: 2019-02-13
Payer: MEDICARE

## 2019-02-13 PROCEDURE — G0237 THERAPEUTIC PROCD STRG ENDUR: HCPCS

## 2019-02-14 ENCOUNTER — HOSPITAL ENCOUNTER (OUTPATIENT)
Dept: PULMONOLOGY | Age: 74
Setting detail: THERAPIES SERIES
Discharge: HOME | End: 2019-02-14
Payer: MEDICARE

## 2019-02-14 PROCEDURE — G0237 THERAPEUTIC PROCD STRG ENDUR: HCPCS

## 2019-02-19 ENCOUNTER — HOSPITAL ENCOUNTER (OUTPATIENT)
Dept: PULMONOLOGY | Age: 74
Setting detail: THERAPIES SERIES
Discharge: HOME | End: 2019-02-19
Payer: MEDICARE

## 2019-02-19 PROCEDURE — G0237 THERAPEUTIC PROCD STRG ENDUR: HCPCS

## 2019-02-21 ENCOUNTER — HOSPITAL ENCOUNTER (OUTPATIENT)
Dept: PULMONOLOGY | Age: 74
Setting detail: THERAPIES SERIES
Discharge: HOME | End: 2019-02-21
Payer: MEDICARE

## 2019-02-21 PROCEDURE — G0237 THERAPEUTIC PROCD STRG ENDUR: HCPCS

## 2019-02-26 ENCOUNTER — HOSPITAL ENCOUNTER (OUTPATIENT)
Dept: PULMONOLOGY | Age: 74
Setting detail: THERAPIES SERIES
Discharge: HOME | End: 2019-02-26
Payer: MEDICARE

## 2019-02-26 PROCEDURE — G0237 THERAPEUTIC PROCD STRG ENDUR: HCPCS

## 2019-02-28 ENCOUNTER — HOSPITAL ENCOUNTER (OUTPATIENT)
Dept: PULMONOLOGY | Age: 74
Setting detail: THERAPIES SERIES
Discharge: HOME | End: 2019-02-28
Payer: MEDICARE

## 2019-02-28 PROCEDURE — G0237 THERAPEUTIC PROCD STRG ENDUR: HCPCS

## 2019-03-07 ENCOUNTER — APPOINTMENT (OUTPATIENT)
Dept: PULMONOLOGY | Age: 74
End: 2019-03-07
Payer: MEDICARE

## 2019-03-12 ENCOUNTER — HOSPITAL ENCOUNTER (OUTPATIENT)
Dept: PULMONOLOGY | Age: 74
Setting detail: THERAPIES SERIES
Discharge: HOME | End: 2019-03-12
Payer: MEDICARE

## 2019-03-12 PROCEDURE — G0237 THERAPEUTIC PROCD STRG ENDUR: HCPCS

## 2019-03-14 ENCOUNTER — HOSPITAL ENCOUNTER (OUTPATIENT)
Dept: PULMONOLOGY | Age: 74
Setting detail: THERAPIES SERIES
Discharge: HOME | End: 2019-03-14
Payer: MEDICARE

## 2019-03-14 PROCEDURE — G0237 THERAPEUTIC PROCD STRG ENDUR: HCPCS

## 2019-03-19 ENCOUNTER — HOSPITAL ENCOUNTER (OUTPATIENT)
Dept: PULMONOLOGY | Age: 74
Setting detail: THERAPIES SERIES
Discharge: HOME | End: 2019-03-19
Payer: MEDICARE

## 2019-03-19 PROCEDURE — G0237 THERAPEUTIC PROCD STRG ENDUR: HCPCS

## 2019-03-21 ENCOUNTER — HOSPITAL ENCOUNTER (OUTPATIENT)
Dept: PULMONOLOGY | Age: 74
Setting detail: THERAPIES SERIES
Discharge: HOME | End: 2019-03-21
Payer: MEDICARE

## 2019-03-21 PROCEDURE — G0237 THERAPEUTIC PROCD STRG ENDUR: HCPCS

## 2019-03-26 ENCOUNTER — HOSPITAL ENCOUNTER (OUTPATIENT)
Dept: PULMONOLOGY | Age: 74
Setting detail: THERAPIES SERIES
Discharge: HOME | End: 2019-03-26
Payer: MEDICARE

## 2019-03-26 PROCEDURE — G0237 THERAPEUTIC PROCD STRG ENDUR: HCPCS

## 2019-03-29 ENCOUNTER — HOSPITAL ENCOUNTER (OUTPATIENT)
Dept: PULMONOLOGY | Age: 74
Setting detail: THERAPIES SERIES
Discharge: HOME | End: 2019-03-29
Payer: MEDICARE

## 2019-03-29 PROCEDURE — G0237 THERAPEUTIC PROCD STRG ENDUR: HCPCS

## 2019-04-02 ENCOUNTER — HOSPITAL ENCOUNTER (OUTPATIENT)
Dept: PULMONOLOGY | Age: 74
Setting detail: THERAPIES SERIES
Discharge: HOME | End: 2019-04-02
Payer: MEDICARE

## 2019-04-02 PROCEDURE — G0237 THERAPEUTIC PROCD STRG ENDUR: HCPCS

## 2019-04-11 ENCOUNTER — HOSPITAL ENCOUNTER (OUTPATIENT)
Dept: PULMONOLOGY | Age: 74
Setting detail: THERAPIES SERIES
Discharge: HOME | End: 2019-04-11
Payer: MEDICARE

## 2019-04-11 PROCEDURE — G0237 THERAPEUTIC PROCD STRG ENDUR: HCPCS

## 2019-04-16 ENCOUNTER — HOSPITAL ENCOUNTER (OUTPATIENT)
Dept: PULMONOLOGY | Age: 74
Setting detail: THERAPIES SERIES
Discharge: HOME | End: 2019-04-16
Payer: MEDICARE

## 2019-04-16 PROCEDURE — G0237 THERAPEUTIC PROCD STRG ENDUR: HCPCS

## 2019-04-18 ENCOUNTER — HOSPITAL ENCOUNTER (OUTPATIENT)
Dept: PULMONOLOGY | Age: 74
Setting detail: THERAPIES SERIES
Discharge: HOME | End: 2019-04-18
Payer: MEDICARE

## 2019-04-18 PROCEDURE — G0237 THERAPEUTIC PROCD STRG ENDUR: HCPCS

## 2019-04-25 ENCOUNTER — HOSPITAL ENCOUNTER (OUTPATIENT)
Dept: PULMONOLOGY | Age: 74
Setting detail: THERAPIES SERIES
Discharge: HOME | End: 2019-04-25
Payer: MEDICARE

## 2019-04-25 PROCEDURE — G0237 THERAPEUTIC PROCD STRG ENDUR: HCPCS

## 2019-04-30 ENCOUNTER — HOSPITAL ENCOUNTER (OUTPATIENT)
Dept: PULMONOLOGY | Age: 74
Setting detail: THERAPIES SERIES
Discharge: HOME | End: 2019-04-30
Payer: MEDICARE

## 2019-04-30 PROCEDURE — G0237 THERAPEUTIC PROCD STRG ENDUR: HCPCS

## 2019-05-13 ENCOUNTER — HOSPITAL ENCOUNTER (OUTPATIENT)
Dept: PULMONOLOGY | Age: 74
Setting detail: THERAPIES SERIES
Discharge: HOME | End: 2019-05-13
Payer: MEDICARE

## 2019-05-13 PROCEDURE — G0237 THERAPEUTIC PROCD STRG ENDUR: HCPCS

## 2019-05-16 ENCOUNTER — HOSPITAL ENCOUNTER (OUTPATIENT)
Dept: PULMONOLOGY | Age: 74
Setting detail: THERAPIES SERIES
Discharge: HOME | End: 2019-05-16
Payer: MEDICARE

## 2019-05-16 PROCEDURE — G0237 THERAPEUTIC PROCD STRG ENDUR: HCPCS

## 2019-05-30 ENCOUNTER — APPOINTMENT (OUTPATIENT)
Dept: PULMONOLOGY | Age: 74
End: 2019-05-30
Payer: MEDICARE

## 2019-06-06 ENCOUNTER — APPOINTMENT (OUTPATIENT)
Dept: PULMONOLOGY | Age: 74
End: 2019-06-06
Payer: MEDICARE

## 2019-06-11 ENCOUNTER — APPOINTMENT (OUTPATIENT)
Dept: PULMONOLOGY | Age: 74
End: 2019-06-11
Payer: MEDICARE

## 2019-06-18 ENCOUNTER — HOSPITAL ENCOUNTER (OUTPATIENT)
Dept: PULMONOLOGY | Age: 74
Setting detail: THERAPIES SERIES
Discharge: HOME | End: 2019-06-18
Payer: MEDICARE

## 2019-06-18 PROCEDURE — G0237 THERAPEUTIC PROCD STRG ENDUR: HCPCS

## 2019-06-19 ENCOUNTER — HOSPITAL ENCOUNTER (OUTPATIENT)
Dept: WOMENS IMAGING | Age: 74
Discharge: HOME | End: 2019-06-21
Payer: MEDICARE

## 2019-06-19 DIAGNOSIS — Z12.31 ENCOUNTER FOR SCREENING MAMMOGRAM FOR BREAST CANCER: ICD-10-CM

## 2019-06-19 PROCEDURE — 77063 BREAST TOMOSYNTHESIS BI: CPT

## 2019-06-25 ENCOUNTER — HOSPITAL ENCOUNTER (OUTPATIENT)
Dept: PULMONOLOGY | Age: 74
Setting detail: THERAPIES SERIES
Discharge: HOME | End: 2019-06-25
Payer: MEDICARE

## 2019-06-25 PROCEDURE — G0237 THERAPEUTIC PROCD STRG ENDUR: HCPCS

## 2019-06-27 ENCOUNTER — HOSPITAL ENCOUNTER (OUTPATIENT)
Dept: PULMONOLOGY | Age: 74
Setting detail: THERAPIES SERIES
Discharge: HOME | End: 2019-06-27
Payer: MEDICARE

## 2019-06-27 PROCEDURE — G0237 THERAPEUTIC PROCD STRG ENDUR: HCPCS

## 2019-07-09 ENCOUNTER — HOSPITAL ENCOUNTER (OUTPATIENT)
Dept: PULMONOLOGY | Age: 74
Setting detail: THERAPIES SERIES
Discharge: HOME | End: 2019-07-09
Payer: MEDICARE

## 2019-07-09 ENCOUNTER — OFFICE VISIT (OUTPATIENT)
Dept: PULMONOLOGY | Age: 74
End: 2019-07-09
Payer: MEDICARE

## 2019-07-09 VITALS
DIASTOLIC BLOOD PRESSURE: 70 MMHG | OXYGEN SATURATION: 98 % | BODY MASS INDEX: 34.17 KG/M2 | WEIGHT: 212.6 LBS | SYSTOLIC BLOOD PRESSURE: 122 MMHG | RESPIRATION RATE: 16 BRPM | TEMPERATURE: 98.1 F | HEART RATE: 86 BPM | HEIGHT: 66 IN

## 2019-07-09 DIAGNOSIS — K21.9 GASTROESOPHAGEAL REFLUX DISEASE, ESOPHAGITIS PRESENCE NOT SPECIFIED: ICD-10-CM

## 2019-07-09 DIAGNOSIS — G47.33 OBSTRUCTIVE SLEEP APNEA: Primary | ICD-10-CM

## 2019-07-09 DIAGNOSIS — I27.20 PULMONARY HTN (HCC): ICD-10-CM

## 2019-07-09 PROCEDURE — G8399 PT W/DXA RESULTS DOCUMENT: HCPCS | Performed by: INTERNAL MEDICINE

## 2019-07-09 PROCEDURE — 4040F PNEUMOC VAC/ADMIN/RCVD: CPT | Performed by: INTERNAL MEDICINE

## 2019-07-09 PROCEDURE — 99214 OFFICE O/P EST MOD 30 MIN: CPT | Performed by: INTERNAL MEDICINE

## 2019-07-09 PROCEDURE — 1090F PRES/ABSN URINE INCON ASSESS: CPT | Performed by: INTERNAL MEDICINE

## 2019-07-09 PROCEDURE — G8427 DOCREV CUR MEDS BY ELIG CLIN: HCPCS | Performed by: INTERNAL MEDICINE

## 2019-07-09 PROCEDURE — 1123F ACP DISCUSS/DSCN MKR DOCD: CPT | Performed by: INTERNAL MEDICINE

## 2019-07-09 PROCEDURE — 3017F COLORECTAL CA SCREEN DOC REV: CPT | Performed by: INTERNAL MEDICINE

## 2019-07-09 PROCEDURE — 1036F TOBACCO NON-USER: CPT | Performed by: INTERNAL MEDICINE

## 2019-07-09 PROCEDURE — G0237 THERAPEUTIC PROCD STRG ENDUR: HCPCS

## 2019-07-09 PROCEDURE — G8417 CALC BMI ABV UP PARAM F/U: HCPCS | Performed by: INTERNAL MEDICINE

## 2019-07-09 RX ORDER — RANITIDINE 300 MG/1
TABLET ORAL
Qty: 90 TABLET | Refills: 3 | Status: SHIPPED | OUTPATIENT
Start: 2019-07-09

## 2019-07-09 ASSESSMENT — ENCOUNTER SYMPTOMS
ABDOMINAL PAIN: 0
DIARRHEA: 0
SORE THROAT: 0
COUGH: 0
CHEST TIGHTNESS: 0
NAUSEA: 0
SINUS PRESSURE: 0
WHEEZING: 0
VOMITING: 0
RHINORRHEA: 0
SHORTNESS OF BREATH: 1

## 2019-07-09 NOTE — PROGRESS NOTES
Subjective:     Jane Enriquez is a 68 y.o. female who complains today of:     Chief Complaint   Patient presents with    Follow-up     6 Month F/U for pulmonary hypertension       HPI  Patient is here for a follow-up visit regarding obstructive sleep apnea, pulmonary hypertension and GERD. Since the last visit patient reports no significant change in her symptoms. Continues to have mild exertional dyspnea, she goes to pulmonary rehab and has benefited fairly well from doing so. She is exercising regularly. She is continuing on her own with phase 3 rehab. She is using her CPAP regularly at night and seemed to benefit well from regular use with no significant daytime hypersomnolence    Allergies:  Patient has no known allergies. Past Medical History:   Diagnosis Date    Anxiety     Depression     Hyperlipidemia     Meniere's disease     CARLOS (obstructive sleep apnea)     Pulmonary hypertension (HCC)      Past Surgical History:   Procedure Laterality Date    BREAST LUMPECTOMY      CHOLECYSTECTOMY      COLONOSCOPY  12/21/15    w/polypectomy     HYSTERECTOMY       Family History   Problem Relation Age of Onset    Brain Cancer Father     Colon Cancer Maternal Grandfather      Social History     Socioeconomic History    Marital status:       Spouse name: Not on file    Number of children: Not on file    Years of education: Not on file    Highest education level: Not on file   Occupational History    Not on file   Social Needs    Financial resource strain: Not on file    Food insecurity:     Worry: Not on file     Inability: Not on file    Transportation needs:     Medical: Not on file     Non-medical: Not on file   Tobacco Use    Smoking status: Former Smoker     Last attempt to quit: 1986     Years since quittin.1    Smokeless tobacco: Never Used   Substance and Sexual Activity    Alcohol use: No    Drug use: No    Sexual activity: Not on file   Lifestyle    place, and time. She appears well-developed and well-nourished. HENT:   Head: Normocephalic and atraumatic. Mouth/Throat: Oropharynx is clear and moist.   Eyes: Pupils are equal, round, and reactive to light. EOM are normal.   Neck: No JVD present. No tracheal deviation present. No thyromegaly present. Cardiovascular: Normal rate and regular rhythm. Exam reveals no gallop. No murmur heard. Pulmonary/Chest: She has no wheezes. She has no rales. She exhibits no tenderness. Abdominal: She exhibits no distension. Musculoskeletal: Normal range of motion. She exhibits no edema. Neurological: She is alert and oriented to person, place, and time. Coordination normal.   Skin: Skin is warm and dry. No rash noted. Psychiatric: She has a normal mood and affect. Assessment:      Diagnosis Orders   1. Obstructive sleep apnea     2. Pulmonary HTN ([facility] [address]     3. Gastroesophageal reflux disease, esophagitis presence not specified  ranitidine (ZANTAC) 300 MG tablet     Patient was advised to continue all current treatment as before, continue regular exercise and attempt to lose weight, otherwise I will see her for follow-up in 6 months      Plan:     No orders of the defined types were placed in this encounter. Orders Placed This Encounter   Medications    ranitidine (ZANTAC) 300 MG tablet     Sig: TAKE 1 TABLET BY MOUTH EVERY NIGHT     Dispense:  90 tablet     Refill:  3     **Patient requests 90 days supply**           Return in about 6 months (around 1/9/2020) for re-evaluation.        Jasper Catalan MD

## 2019-07-16 ENCOUNTER — HOSPITAL ENCOUNTER (OUTPATIENT)
Dept: PULMONOLOGY | Age: 74
Setting detail: THERAPIES SERIES
Discharge: HOME | End: 2019-07-16
Payer: MEDICARE

## 2019-07-16 PROCEDURE — G0237 THERAPEUTIC PROCD STRG ENDUR: HCPCS

## 2019-07-23 ENCOUNTER — HOSPITAL ENCOUNTER (OUTPATIENT)
Dept: PULMONOLOGY | Age: 74
Setting detail: THERAPIES SERIES
Discharge: HOME | End: 2019-07-23
Payer: MEDICARE

## 2019-07-23 PROCEDURE — G0237 THERAPEUTIC PROCD STRG ENDUR: HCPCS

## 2019-08-01 ENCOUNTER — HOSPITAL ENCOUNTER (OUTPATIENT)
Dept: PULMONOLOGY | Age: 74
Setting detail: THERAPIES SERIES
Discharge: HOME | End: 2019-08-01
Payer: MEDICARE

## 2019-08-01 PROCEDURE — G0237 THERAPEUTIC PROCD STRG ENDUR: HCPCS

## 2019-08-27 ENCOUNTER — HOSPITAL ENCOUNTER (OUTPATIENT)
Dept: PULMONOLOGY | Age: 74
Setting detail: THERAPIES SERIES
Discharge: HOME | End: 2019-08-27
Payer: MEDICARE

## 2019-08-27 PROCEDURE — G0237 THERAPEUTIC PROCD STRG ENDUR: HCPCS

## 2019-08-29 ENCOUNTER — HOSPITAL ENCOUNTER (OUTPATIENT)
Dept: PULMONOLOGY | Age: 74
Setting detail: THERAPIES SERIES
Discharge: HOME | End: 2019-08-29
Payer: MEDICARE

## 2019-08-29 PROCEDURE — G0237 THERAPEUTIC PROCD STRG ENDUR: HCPCS

## 2020-02-25 ENCOUNTER — HOSPITAL ENCOUNTER (OUTPATIENT)
Dept: CT IMAGING | Age: 75
Discharge: HOME | End: 2020-02-27
Payer: MEDICARE

## 2020-02-25 VITALS — DIASTOLIC BLOOD PRESSURE: 68 MMHG | RESPIRATION RATE: 16 BRPM | HEART RATE: 95 BPM | SYSTOLIC BLOOD PRESSURE: 151 MMHG

## 2020-02-25 LAB
GFR AFRICAN AMERICAN: >60
GFR AFRICAN AMERICAN: >60
GFR NON-AFRICAN AMERICAN: >60
GFR NON-AFRICAN AMERICAN: >60
PERFORMED ON: ABNORMAL
PERFORMED ON: NORMAL
POC CREATININE: 0.7 MG/DL (ref 0.6–1.2)
POC CREATININE: <0.2 MG/DL (ref 0.6–1.2)
POC SAMPLE TYPE: ABNORMAL
POC SAMPLE TYPE: NORMAL

## 2020-02-25 PROCEDURE — 6360000004 HC RX CONTRAST MEDICATION: Performed by: INTERNAL MEDICINE

## 2020-02-25 PROCEDURE — 73701 CT LOWER EXTREMITY W/DYE: CPT

## 2020-02-25 RX ORDER — SODIUM CHLORIDE 0.9 % (FLUSH) 0.9 %
10 SYRINGE (ML) INJECTION
Status: DISPENSED | OUTPATIENT
Start: 2020-02-25 | End: 2020-02-25

## 2020-02-25 RX ADMIN — IOPAMIDOL 100 ML: 755 INJECTION, SOLUTION INTRAVENOUS at 12:48

## 2020-06-19 ENCOUNTER — OFFICE VISIT (OUTPATIENT)
Dept: PULMONOLOGY | Age: 75
End: 2020-06-19
Payer: MEDICARE

## 2020-06-19 VITALS
DIASTOLIC BLOOD PRESSURE: 86 MMHG | TEMPERATURE: 97.1 F | WEIGHT: 225 LBS | RESPIRATION RATE: 18 BRPM | HEIGHT: 67 IN | SYSTOLIC BLOOD PRESSURE: 138 MMHG | BODY MASS INDEX: 35.31 KG/M2 | OXYGEN SATURATION: 98 % | HEART RATE: 82 BPM

## 2020-06-19 PROCEDURE — G8399 PT W/DXA RESULTS DOCUMENT: HCPCS | Performed by: INTERNAL MEDICINE

## 2020-06-19 PROCEDURE — G8417 CALC BMI ABV UP PARAM F/U: HCPCS | Performed by: INTERNAL MEDICINE

## 2020-06-19 PROCEDURE — 4040F PNEUMOC VAC/ADMIN/RCVD: CPT | Performed by: INTERNAL MEDICINE

## 2020-06-19 PROCEDURE — G8427 DOCREV CUR MEDS BY ELIG CLIN: HCPCS | Performed by: INTERNAL MEDICINE

## 2020-06-19 PROCEDURE — 1036F TOBACCO NON-USER: CPT | Performed by: INTERNAL MEDICINE

## 2020-06-19 PROCEDURE — 1090F PRES/ABSN URINE INCON ASSESS: CPT | Performed by: INTERNAL MEDICINE

## 2020-06-19 PROCEDURE — 99214 OFFICE O/P EST MOD 30 MIN: CPT | Performed by: INTERNAL MEDICINE

## 2020-06-19 PROCEDURE — 3017F COLORECTAL CA SCREEN DOC REV: CPT | Performed by: INTERNAL MEDICINE

## 2020-06-19 PROCEDURE — 1123F ACP DISCUSS/DSCN MKR DOCD: CPT | Performed by: INTERNAL MEDICINE

## 2020-06-19 ASSESSMENT — ENCOUNTER SYMPTOMS
DIARRHEA: 0
CHEST TIGHTNESS: 0
WHEEZING: 0
VOMITING: 0
NAUSEA: 0
SORE THROAT: 0
SHORTNESS OF BREATH: 1
SINUS PRESSURE: 0
RHINORRHEA: 0
ABDOMINAL PAIN: 0
COUGH: 0

## 2020-06-19 NOTE — PROGRESS NOTES
Subjective:     Thanh Damico is a 76 y.o. female who complains today of:     Chief Complaint   Patient presents with    Follow-up     pulmonary HTN, patient c/o increased SOB x 2 months       HPI  Patient is here for a follow-up visit regarding obstructive sleep apnea, pulmonary hypertension and shortness of breath. Patient reports increased shortness of breath over the past 2 months gradually, she also admits to becoming less active, staying at home with the recommendations for confinement associated with this current pandemic, and admits to about 10 pound weight gain. She denies significant cough, wheezing, chest pains or increased lower extremity edema. Patient had previous PFTs which were normal, work-up for other pulmonary causes of dyspnea were negative, she has mild pulmonary hypertension she has known obstructive sleep apnea, she has not been using her CPAP every night she averages 4-5 nights a week according to her. Allergies:  Patient has no known allergies. Past Medical History:   Diagnosis Date    Anxiety     Depression     Hyperlipidemia     Meniere's disease     CARLOS (obstructive sleep apnea)     Pulmonary hypertension (HCC)      Past Surgical History:   Procedure Laterality Date    BREAST LUMPECTOMY      CHOLECYSTECTOMY      COLONOSCOPY  12/21/15    w/polypectomy     HYSTERECTOMY       Family History   Problem Relation Age of Onset    Brain Cancer Father     Colon Cancer Maternal Grandfather      Social History     Socioeconomic History    Marital status:       Spouse name: Not on file    Number of children: Not on file    Years of education: Not on file    Highest education level: Not on file   Occupational History    Not on file   Social Needs    Financial resource strain: Not on file    Food insecurity     Worry: Not on file     Inability: Not on file    Transportation needs     Medical: Not on file     Non-medical: Not on file   Tobacco Use    Vitals:    06/19/20 0948   BP: 138/86   Pulse: 82   Resp: 18   Temp: 97.1 °F (36.2 °C)   SpO2: 98%   Weight: 225 lb (102.1 kg)   Height: 5' 6.5\" (1.689 m)         Physical Exam  Constitutional:       Appearance: She is well-developed. HENT:      Head: Normocephalic and atraumatic. Eyes:      Pupils: Pupils are equal, round, and reactive to light. Neck:      Thyroid: No thyromegaly. Vascular: No JVD. Trachea: No tracheal deviation. Cardiovascular:      Rate and Rhythm: Normal rate and regular rhythm. Heart sounds: No murmur. No gallop. Pulmonary:      Breath sounds: No wheezing or rales. Chest:      Chest wall: No tenderness. Abdominal:      General: There is no distension. Musculoskeletal: Normal range of motion. Skin:     General: Skin is warm and dry. Findings: No rash. Neurological:      Mental Status: She is alert and oriented to person, place, and time. Coordination: Coordination normal.             Assessment:      Diagnosis Orders   1. Obstructive sleep apnea     2. Pulmonary HTN ([address]     3. Gastroesophageal reflux disease, esophagitis presence not specified       Had a long discussion with patient about causes of progressive dyspnea, all the previous work-up, there are no indications of development of pulmonary disorder other than previously made diagnoses which were addressed with her at length. I offered reevaluation although did not feel a definite need for that, but she declined, she agreed that exercise, and weight loss are probably the answer at this point. She was encouraged to use her CPAP regularly as well. Plan:     No orders of the defined types were placed in this encounter. No orders of the defined types were placed in this encounter. Return in about 6 months (around 12/19/2020).       Myra Kerr MD

## 2020-08-03 ENCOUNTER — HOSPITAL ENCOUNTER (OUTPATIENT)
Dept: WOMENS IMAGING | Age: 75
Discharge: HOME | End: 2020-08-05
Payer: MEDICARE

## 2020-08-03 PROCEDURE — 77063 BREAST TOMOSYNTHESIS BI: CPT

## 2021-04-14 ENCOUNTER — HOSPITAL ENCOUNTER (OUTPATIENT)
Dept: PHYSICAL THERAPY | Age: 76
Setting detail: THERAPIES SERIES
Discharge: HOME | End: 2021-04-14
Payer: MEDICARE

## 2021-04-14 PROCEDURE — 97162 PT EVAL MOD COMPLEX 30 MIN: CPT

## 2021-04-14 ASSESSMENT — PAIN DESCRIPTION - INTENSITY: RATING_2: 2

## 2021-04-14 ASSESSMENT — PAIN DESCRIPTION - LOCATION: LOCATION_2: NECK

## 2021-04-14 ASSESSMENT — PAIN DESCRIPTION - FREQUENCY: FREQUENCY: CONTINUOUS

## 2021-04-14 ASSESSMENT — PAIN DESCRIPTION - PAIN TYPE: TYPE: CHRONIC PAIN

## 2021-04-14 NOTE — PROGRESS NOTES
[facility] [address], [address]     Ph: [phone]  Fax: [phone]    [x] Certification  [] Recertification [x]  Plan of Care  [] Progress Note [] Discharge      To:  Antonia Koenig MD      From:  Elodia Galarza, PT, DPT  Patient: Orlin Oseguera     : 1945  Diagnosis: cervicalgia, low back pain     Date: 2021  Treatment Diagnosis: low back pain, LE weakness, neck pain, impaired daily activity tolerance, impaired gait    Plan of Care/Certification Expiration Date: 21  Progress Report Period from:  2021  to 2021    Total # of Visits to Date: 1   No Show: 0    Canceled Appointment: 0     OBJECTIVE:   Short Term Goals - Time Frame for Short term goals: 3    Goals Current/Discharge status  Met   Short term goal 1: Pt will report at least 50% decrease in LBP on days participating in aquatic therapy  Aquatics to be trialled  Will advance to land based tx [] yes  [x] no     Long Term Goals - Time Frame for Long term goals : 6  Goals Current/ Discharge status Met   Long term goal 1: Independent and compliant with aquatic and land based HEPs to continue physical activity post-discharge HEPs to be provided [] yes  [x] no   Long term goal 2: LE strength at least 4+/5 to ambulate up/down 8\" steps with HR support Strength RLE  Comment: Hip: 3+ to 4-; knee: 4/5  Strength LLE  Comment: Hip: 3+ to 4- ; knee: 4-/5        Strength Other  Other: 5x sit to stand: 13 seconds   [] yes  [x] no   Long term goal 3: Pt will complete 6' walk test with LRAD displaying good stability throughout duration of test to demo improved endurance. Reports needing rollator for long distance ambulation.  Arrived at clinic w/o AD [] yes  [x] no   Long term goal 4: Cervical AROM WNL w/o discomfort to improve driving tolerance Spine  Cervical: dull pain with Rot R and SB L, Raul Rot ~75% WNL     [] yes  [x] no       Body structures, Functions,

## 2021-04-20 ENCOUNTER — HOSPITAL ENCOUNTER (OUTPATIENT)
Dept: PHYSICAL THERAPY | Age: 76
Setting detail: THERAPIES SERIES
Discharge: HOME | End: 2021-04-20
Payer: MEDICARE

## 2021-04-20 NOTE — PROGRESS NOTES
Therapy                            Cancellation/No-show Note      Date:  2021  Patient Name:  Ghazal Collado  :  1945   MRN:  [id number]  Referring Practitioner: Dawson Mg MD  Diagnosis: cervicalgia, low back pain    Visit Information:  PT Visit Information  PT Insurance Information: Medicare  Total # of Visits Approved: 99(BMN)  Total # of Visits to Date: 1  Plan of Care/Certification Expiration Date: 21  No Show: 0  Canceled Appointment: 1  Progress Note Counter: -10 (PN by 21) cx 21    For today's appointment patient:  [x]  Cancelled  []  Rescheduled appointment  []  No-show   []  Called pt to remind of next appointment     Reason given by patient:  []  Patient ill  []  Conflicting appointment  []  No transportation    []  Conflict with work  []  No reason given  [x]  Other:   Back spasm    [x] Pt has future appointments scheduled, no follow up needed  [] Pt requests to be on hold.     Reason:   If > 2 weeks please discuss with therapist.  [] Therapist to call pt for follow up     Comments:       Signature: Electronically signed by Nancy Kebede PTA on 21 at 10:20 AM EDT

## 2021-04-27 ENCOUNTER — HOSPITAL ENCOUNTER (OUTPATIENT)
Dept: PHYSICAL THERAPY | Age: 76
Setting detail: THERAPIES SERIES
Discharge: HOME | End: 2021-04-27
Payer: MEDICARE

## 2021-04-27 PROCEDURE — 97113 AQUATIC THERAPY/EXERCISES: CPT

## 2021-04-27 ASSESSMENT — PAIN DESCRIPTION - PAIN TYPE: TYPE: CHRONIC PAIN

## 2021-04-27 NOTE — PROGRESS NOTES
[address] [address]  Outpatient Physical Therapy    Treatment Note        Date: 2021  Patient: Lay Ireland  : 1945  ACCT #: [de-identified]  Referring Practitioner: Socorro Almaraz MD  Diagnosis: cervicalgia, low back pain    Visit Information:  PT Visit Information  PT Insurance Information: Medicare  Total # of Visits Approved: 99(BMN)  Total # of Visits to Date: 2  Plan of Care/Certification Expiration Date: 21  No Show: 1  Canceled Appointment: 1  Progress Note Counter: -10 (PN by 21)    Subjective: Pt stated that her back ans neck always hurt     HEP Compliance:  [x] Good [] Fair [] Poor [] Reports not doing due to:    Vital Signs  Patient Currently in Pain: Yes   Pain Screening  Patient Currently in Pain: Yes  Pain Assessment  Pain Assessment: 0-10  Pain Level: 6  Pain Type: Chronic pain  Pain Location: Back;Neck  Pain Orientation: Right; Lower  Pain Descriptors: Aching;Dull    OBJECTIVE:   Exercises  Exercise 1: Aquatics  Exercise 2: ambulation: F, L, R x3 laps each  Exercise 3: sink ex [id number] ; hip circles x10  Exercise 4: step ups F/L [id number] b/l  Exercise 6: DB hang x2min ; scissor kicks, bicycle [id number]  Exercise 7: TA iso's x10    Strength: [x] NT  [] MMT completed:    ROM: [x] NT  [] ROM measurements:    *Indicates exercise, modality, or manual techniques to be initiated when appropriate    Assessment: Body structures, Functions, Activity limitations: Increased pain, Decreased strength, Decreased ROM, Decreased functional mobility , Decreased high-level IADLs  Assessment: Iniated aquatic exercises with good tolerance noted. Pt with no c/o incr in pain throughout tx, pt stating that it feels good to be in the water.   Treatment Diagnosis: low back pain, LE weakness, neck pain, impaired daily activity tolerance, impaired gait  Goals:  Short term goals  Time Frame for Short term goals: 3  Short term goal 1: Pt will report at least 50% decrease in LBP on days participating in aquatic therapy    Long term goals  Time Frame for Long term goals : 6  Long term goal 1: Independent and compliant with aquatic and land based HEPs to continue physical activity post-discharge  Long term goal 2: LE strength at least 4+/5 to ambulate up/down 8\" steps with HR support  Long term goal 3: Pt will complete 6' walk test with LRAD displaying good stability throughout duration of test to demo improved endurance. Long term goal 4: Cervical AROM WNL w/o discomfort to improve driving tolerance  Progress toward goals: incr strength and rom    POST-PAIN       Pain Rating (0-10 pain scale):  2 /10   Location and pain description same as pre-treatment unless indicated. Action: [] NA   [x] Perform HEP  [] Meds as prescribed  [] Modalities as prescribed   [] Call Physician     Frequency/Duration:  Plan  Times per week: 2-3  Plan weeks: 6 weeks  Current Treatment Recommendations: Aquatics, ROM, Strengthening, Balance Training, Functional Mobility Training, Home Exercise Program, Modalities, Manual Therapy - Soft Tissue Mobilization, Manual Therapy - Joint Manipulation  Plan Comment: starting with aquatics and advancing to land treatments     Pt to continue current HEP. See objective section for any therapeutic exercise changes, additions or modifications this date.     PT Individual Minutes  Time In: 4662  Time Out: 2402  Minutes: 38  Timed Code Treatment Minutes: 38 Minutes  Procedure Minutes:0     Timed Activity Minutes Units   AQ Ther Ex 38 3       Signature:  Electronically signed by Alisha Cazares PTA on 4/27/21 at 2:20 PM EDT

## 2021-04-30 ENCOUNTER — HOSPITAL ENCOUNTER (OUTPATIENT)
Dept: PHYSICAL THERAPY | Age: 76
Setting detail: THERAPIES SERIES
Discharge: HOME | End: 2021-04-30
Payer: MEDICARE

## 2021-04-30 PROCEDURE — 97113 AQUATIC THERAPY/EXERCISES: CPT

## 2021-04-30 NOTE — PROGRESS NOTES
[address] [address]  Outpatient Physical Therapy    Treatment Note        Date: 2021  Patient: Kwasi Vega  : 1945  ACCT #: [de-identified]  Referring Practitioner: Mary Jo Sahni MD  Diagnosis: cervicalgia, low back pain    Visit Information:  PT Visit Information  PT Insurance Information: Medicare  Total # of Visits Approved: 99(BMN)  Total # of Visits to Date: 3  Plan of Care/Certification Expiration Date: 21  No Show: 1  Canceled Appointment: 1  Progress Note Counter: 3/8-10 (PN by 21)    Subjective: Pt reports no problems after last session with soreness. No pain currently     HEP Compliance:  [x] Good [] Fair [] Poor [] Reports not doing due to:    Vital Signs  Patient Currently in Pain: Denies   Pain Screening  Patient Currently in Pain: Denies    OBJECTIVE:   Exercises  Exercise 2: ambulation: F, L, R x3 laps each  Exercise 3: sink ex & hip circles x12  Exercise 4: step ups F/L [id number] b/l  Exercise 6: DB hang x5 min ; scissor kicks, Add/Ab, bicycle x2min each  Exercise 7: TA iso's x15       Strength: [x] NT  [] MMT completed:        ROM: [x] NT  [] ROM measurements:        *Indicates exercise, modality, or manual techniques to be initiated when appropriate    Assessment: Body structures, Functions, Activity limitations: Increased pain, Decreased strength, Decreased ROM, Decreased functional mobility , Decreased high-level IADLs  Assessment: Iniated aquatic exercises with good tolerance noted t sruggled with with poli artis deep end act and requested to end hang at 3 minutes.   Pt with no c/o incr in pain throughout tx,  Treatment Diagnosis: low back pain, LE weakness, neck pain, impaired daily activity tolerance, impaired gait          Goals:  Short term goals  Time Frame for Short term goals: 3  Short term goal 1: Pt will report at least 50% decrease in LBP on days participating in aquatic therapy    Long term goals  Time Frame for Long term goals : 6  Long term goal 1: Independent and compliant with aquatic and land based HEPs to continue physical activity post-discharge  Long term goal 2: LE strength at least 4+/5 to ambulate up/down 8\" steps with HR support  Long term goal 3: Pt will complete 6' walk test with LRAD displaying good stability throughout duration of test to demo improved endurance. Long term goal 4: Cervical AROM WNL w/o discomfort to improve driving tolerance  Progress toward goals: Progressing towards goals. POST-PAIN       Pain Rating (0-10 pain scale):  0 /10   Location and pain description same as pre-treatment unless indicated. Action: [] NA   [] Perform HEP  [] Meds as prescribed  [] Modalities as prescribed   [] Call Physician     Frequency/Duration:  Plan  Times per week: 2-3  Plan weeks: 6 weeks  Current Treatment Recommendations: Aquatics, ROM, Strengthening, Balance Training, Functional Mobility Training, Home Exercise Program, Modalities, Manual Therapy - Soft Tissue Mobilization, Manual Therapy - Joint Manipulation  Plan Comment: starting with aquatics and advancing to land treatments     Pt to continue current HEP. See objective section for any therapeutic exercise changes, additions or modifications this date.          PT Individual Minutes  Time In: 6675  Time Out: 5355  Minutes: 33  Timed Code Treatment Minutes: 33 Minutes  Procedure Minutes:0     Timed Activity Minutes Units   Aquatics 33 2       Signature:  Electronically signed by Suellen العلي PTA on 4/30/21 at 3:26 PM EDT

## 2021-05-03 ENCOUNTER — HOSPITAL ENCOUNTER (OUTPATIENT)
Dept: PHYSICAL THERAPY | Age: 76
Setting detail: THERAPIES SERIES
Discharge: HOME | End: 2021-05-03
Payer: MEDICARE

## 2021-05-03 NOTE — PROGRESS NOTES
Therapy                            Cancellation/No-show Note      Date:  5/3/2021  Patient Name:  Lauri Stovall  :  1945   MRN:  [id number]  Referring Practitioner: Fátima Goode MD  Diagnosis: cervicalgia, low back pain    Visit Information:  PT Visit Information  PT Insurance Information: Medicare  Total # of Visits Approved: 99(BMN)  Total # of Visits to Date: 3  Plan of Care/Certification Expiration Date: 21  No Show: 1  Canceled Appointment: 2  Progress Note Counter: 3/8-10 (PN by 21) cx on 5/3/21    For today's appointment patient:  [x]  Cancelled  []  Rescheduled appointment  []  No-show   []  Called pt to remind of next appointment     Reason given by patient:  []  Patient ill  [x]  Conflicting appointment  []  No transportation    []  Conflict with work  []  No reason given  []  Other:      [x] Pt has future appointments scheduled, no follow up needed  [] Pt requests to be on hold.     Reason:   If > 2 weeks please discuss with therapist.  [] Therapist to call pt for follow up     Comments:       Signature: Electronically signed by Naida Lara PTA on 5/3/21 at 11:27 AM EDT

## 2021-05-06 ENCOUNTER — APPOINTMENT (OUTPATIENT)
Dept: PHYSICAL THERAPY | Age: 76
End: 2021-05-06
Payer: MEDICARE

## 2021-05-07 ENCOUNTER — HOSPITAL ENCOUNTER (OUTPATIENT)
Dept: PHYSICAL THERAPY | Age: 76
Setting detail: THERAPIES SERIES
Discharge: HOME | End: 2021-05-07
Payer: MEDICARE

## 2021-05-07 NOTE — PROGRESS NOTES
Therapy                            Cancellation/No-show Note      Date:  2021  Patient Name:  Richie Terry  :  1945   MRN:  [id number]  Referring Practitioner: Chinmay Ruiz MD  Diagnosis: cervicalgia, low back pain    Visit Information:  PT Visit Information  PT Insurance Information: Medicare  Total # of Visits Approved: 99(BMN)  Total # of Visits to Date: 3  Plan of Care/Certification Expiration Date: 21  No Show: 1  Canceled Appointment: 3  Progress Note Counter: 3/8-10 (PN by 21) cx on 21    For today's appointment patient:  [x]  Cancelled  []  Rescheduled appointment  []  No-show   []  Called pt to remind of next appointment     Reason given by patient:  []  Patient ill  [x]  Conflicting appointment  []  No transportation    []  Conflict with work  []  No reason given  []  Other:      [x] Pt has future appointments scheduled, no follow up needed  [] Pt requests to be on hold.     Reason:   If > 2 weeks please discuss with therapist.  [] Therapist to call pt for follow up       Signature: Electronically signed by Alisia Snowden PT on 21 at 1:54 PM EDT

## 2021-05-14 ENCOUNTER — APPOINTMENT (OUTPATIENT)
Dept: PHYSICAL THERAPY | Age: 76
End: 2021-05-14
Payer: MEDICARE

## 2021-05-14 ENCOUNTER — HOSPITAL ENCOUNTER (OUTPATIENT)
Dept: PHYSICAL THERAPY | Age: 76
Setting detail: THERAPIES SERIES
Discharge: HOME | End: 2021-05-14
Payer: MEDICARE

## 2021-05-14 PROCEDURE — 97113 AQUATIC THERAPY/EXERCISES: CPT

## 2021-05-14 NOTE — PROGRESS NOTES
Alanna christy, [address]     Ph: [phone]  Fax: [phone]    [] Certification  [] Recertification [x]  Plan of Care  [] Progress Note [] Discharge      To:  Sonja River MD      From:  Lissett Vela, PT, DPT  Patient: Kaiden Orellana     : 1945  Diagnosis: cervicalgia, low back pain     Date: 2021  Treatment Diagnosis: low back pain, LE weakness, neck pain, impaired daily activity tolerance, impaired gait    Plan of Care/Certification Expiration Date: 21  Progress Report Period from: 21   to 2021    Total # of Visits to Date: 4   No Show: 1    Canceled Appointment: 3     OBJECTIVE:   Short Term Goals - Time Frame for Short term goals: 3    Goals Current/Discharge status  Met   Short term goal 1: Pt will report at least 50% decrease in LBP on days participating in aquatic therapy  Pt reports to decreased LBP day of aquatic therapy. [] yes  [] no     Long Term Goals - Time Frame for Long term goals : 6  Goals Current/ Discharge status Met   Long term goal 1: Independent and compliant with aquatic and land based HEPs to continue physical activity post-discharge Fair  [] yes  [x] no   Long term goal 2: LE strength at least 4+/5 to ambulate up/down 8\" steps with HR support Strength RLE  Comment: Hip: 3+ to 4-; knee: 4/5  Strength LLE  Comment: Hip: 3+ to 4- ; knee: 4-/5   [] yes  [x] no   Long term goal 3: Pt will complete 6' walk test with LRAD displaying good stability throughout duration of test to demo improved endurance.  NT d/t pt  limited visits due to attendance [] yes  [x] no   Long term goal 4: Cervical AROM WNL w/o discomfort to improve driving tolerance NT d/t limited visits due to attendance [] yes  [x] no       Body structures, Functions, Activity limitations: Increased pain, Decreased strength, Decreased ROM, Decreased functional mobility , Decreased high-level IADLs  Assessment: Pharmacy calling stating psyllium capsule not covered, ok to get new script for tablets?    Please call soon.     Pt still participating in aquatic therapy. Fair/poor attendance so far throughout POC. LE weakness still displayed. Will continue current POC focusing on neck, back, and LE deficits working toward all established therapy goals. Discharge Recommendations: Continue to assess pending progress         PLAN:   Frequency/Duration:  Plan  Times per week: 2-3  Plan weeks: 6 weeks  Current Treatment Recommendations: Aquatics, ROM, Strengthening, Balance Training, Functional Mobility Training, Home Exercise Program, Modalities, Manual Therapy - Soft Tissue Mobilization, Manual Therapy - Joint Manipulation  Plan Comment: starting with aquatics and advancing to land treatments     Precautions: Fall Risk                           Patient Status:[x] Continue/ Initiate plan of Care    [] Discharge PT. Recommend pt continue with HEP. [] Additional visits requested, Please re-certify for additional visits:          Signature: Electronically signed by Rafael Cosby PTA on 5/14/21 at 4:25 PM EDT  Signature: Electronically signed by Carito Mcclure PT on 5/14/2021 at 6:19 PM      If you have any questions or concerns, please don't hesitate to call. Thank you for your referral.    I have reviewed this plan of care and certify a need for medically necessary rehabilitation services.     Physician Signature:__________________________________________________________  Date:  Please sign and return

## 2021-05-14 NOTE — PROGRESS NOTES
daily activity tolerance, impaired gait          Goals:  Short term goals  Time Frame for Short term goals: 3  Short term goal 1: Pt will report at least 50% decrease in LBP on days participating in aquatic therapy    Long term goals  Time Frame for Long term goals : 6  Long term goal 1: Independent and compliant with aquatic and land based HEPs to continue physical activity post-discharge  Long term goal 2: LE strength at least 4+/5 to ambulate up/down 8\" steps with HR support  Long term goal 3: Pt will complete 6' walk test with LRAD displaying good stability throughout duration of test to demo improved endurance. Long term goal 4: Cervical AROM WNL w/o discomfort to improve driving tolerance  Progress toward goals:    POST-PAIN       Pain Rating (0-10 pain scale):   0/10   Location and pain description same as pre-treatment unless indicated. Action: [x] NA   [] Perform HEP  [] Meds as prescribed  [] Modalities as prescribed   [] Call Physician     Frequency/Duration:  Plan  Times per week: 2-3  Plan weeks: 6 weeks  Current Treatment Recommendations: Aquatics, ROM, Strengthening, Balance Training, Functional Mobility Training, Home Exercise Program, Modalities, Manual Therapy - Soft Tissue Mobilization, Manual Therapy - Joint Manipulation  Plan Comment: starting with aquatics and advancing to land treatments     Pt to continue current HEP. See objective section for any therapeutic exercise changes, additions or modifications this date.          PT Individual Minutes  Time In: 1402  Time Out: 3295  Minutes: 40  Timed Code Treatment Minutes: 40 Minutes  Procedure Minutes: n/a   Timed Activity Minutes Units   Aquatic Ther Ex        40       3       Signature:  Electronically signed by Ana Jensen PTA on 5/14/21 at 4:20 PM EDT

## 2021-05-17 LAB
FOLATE: 7.7 NG/ML (ref 7.3–26.1)
HBA1C MFR BLD: 5.8 % (ref 4.8–5.9)
TSH SERPL DL<=0.05 MIU/L-ACNC: 3.07 UIU/ML (ref 0.44–3.86)
VITAMIN B-12: 362 PG/ML (ref 232–1245)
VITAMIN D 25-HYDROXY: 35.8 NG/ML (ref 30–100)

## 2021-05-19 LAB
ANA PATTERN: ABNORMAL
ANA TITER: ABNORMAL
ANTINUCLEAR AB INTERPRETIVE COMMENT: ABNORMAL
ANTINUCLEAR ANTIBODY, HEP-2, IGG: DETECTED

## 2021-05-20 LAB
ALBUMIN SERPL-MCNC: 3.78 G/DL (ref 3.75–5.01)
ALPHA-1-GLOBULIN: 0.31 G/DL (ref 0.19–0.46)
ALPHA-2-GLOBULIN: 0.9 G/DL (ref 0.48–1.05)
BETA GLOBULIN: 0.75 G/DL (ref 0.48–1.1)
GAMMA GLOBULIN: 0.86 G/DL (ref 0.62–1.51)
PROTEIN ELECTROPHORESIS, SERUM: NORMAL
SPE/IFE INTERPRETATION: NORMAL
TOTAL PROTEIN: 6.6 G/DL (ref 6.3–8.2)

## 2021-05-25 ENCOUNTER — HOSPITAL ENCOUNTER (OUTPATIENT)
Dept: PHYSICAL THERAPY | Age: 76
Setting detail: THERAPIES SERIES
Discharge: HOME | End: 2021-05-25
Payer: MEDICARE

## 2021-05-25 PROCEDURE — 97113 AQUATIC THERAPY/EXERCISES: CPT

## 2021-05-25 ASSESSMENT — PAIN DESCRIPTION - PAIN TYPE: TYPE: CHRONIC PAIN

## 2021-05-25 ASSESSMENT — PAIN SCALES - GENERAL: PAINLEVEL_OUTOF10: 6

## 2021-05-25 NOTE — PROGRESS NOTES
[address] [address]  Outpatient Physical Therapy    Treatment Note        Date: 2021  Patient: Miri Hamlin  : 1945  ACCT #: [de-identified]  Referring Practitioner: Feliberto Gaytan MD  Diagnosis: cervicalgia, low back pain    Visit Information:  PT Visit Information  PT Insurance Information: Medicare  Total # of Visits Approved: 80 (BMN)  Total # of Visits to Date: 5  Plan of Care/Certification Expiration Date: 21  No Show: 1  Canceled Appointment: 3  Progress Note Counter: -10 (PN by 21)    Subjective: Pt recently was on a 12 hour bus trip. Reporting increased LBP after trip. Has been using heat and medication for relief. HEP Compliance:  [] Good [] Fair [] Poor [] Reports not doing due to:    Vital Signs  Patient Currently in Pain: Yes   Pain Screening  Patient Currently in Pain: Yes  Pain Assessment  Pain Assessment: 0-10  Pain Level: 6  Pain Type: Chronic pain  Pain Location: Back  Pain Orientation: Lower    OBJECTIVE:   Exercises  Exercise 1: Aquatics  Exercise 2: ambulation: F, L, R x3 laps each  Exercise 5: deep end bicycles x 4 min  Exercise 6: deep end hip abd/add flutter kicks x 4 min  Exercise 7: deep end hip flex/ext flutter kicks x 4 min  Exercise 8: deep end liyah knee flex/ext x 4 min  Exercise 9: left HS stretches at steps x 3-4 min    Strength: [] NT  [] MMT completed:    ROM: [] NT  [] ROM measurements:     *Indicates exercise, modality, or manual techniques to be initiated when appropriate    Assessment: Body structures, Functions, Activity limitations: Increased pain, Decreased strength, Decreased ROM, Decreased functional mobility , Decreased high-level IADLs  Assessment: Cont'd aquatic exercises. Introduced pt to LE stretches to further address L knee ROM limitations that impacts her gait tolerance. Good tolerance to aquatic exercises without c/o of increased LBP and decreased pain overall noted upon exiting pool.  Rollator used for mobility outside of

## 2021-05-28 ENCOUNTER — HOSPITAL ENCOUNTER (OUTPATIENT)
Dept: PHYSICAL THERAPY | Age: 76
Setting detail: THERAPIES SERIES
Discharge: HOME | End: 2021-05-28
Payer: MEDICARE

## 2021-05-28 PROCEDURE — 97113 AQUATIC THERAPY/EXERCISES: CPT

## 2021-06-01 ENCOUNTER — HOSPITAL ENCOUNTER (OUTPATIENT)
Dept: PHYSICAL THERAPY | Age: 76
Setting detail: THERAPIES SERIES
Discharge: HOME | End: 2021-06-01
Payer: MEDICARE

## 2021-06-01 NOTE — PROGRESS NOTES
Therapy                            Cancellation/No-show Note      Date:  2021  Patient Name:  Catalina Francis  :  1945   MRN:  [id number]  Referring Practitioner: Savanna Gardner MD  Diagnosis: cervicalgia, low back pain    Visit Information:  PT Visit Information  PT Insurance Information: Medicare  Total # of Visits Approved: 80 (BMN)  Total # of Visits to Date: 6  Plan of Care/Certification Expiration Date: 21  No Show: 1  Canceled Appointment: 4  Progress Note Counter: -10 (PN by 21) Called to cx., not feeling well. For today's appointment patient:  [x]  Cancelled  []  Rescheduled appointment  []  No-show   []  Called pt to remind of next appointment     Reason given by patient:  [x]  Patient ill  []  Conflicting appointment  []  No transportation    []  Conflict with work  []  No reason given  []  Other:      [x] Pt has future appointments scheduled, no follow up needed  [] Pt requests to be on hold.     Reason:   If > 2 weeks please discuss with therapist.  [] Therapist to call pt for follow up     Comments:       Signature: Electronically signed by Debbie Biswas PTA on 21 at 11:08 AM EDT

## 2021-06-04 ENCOUNTER — HOSPITAL ENCOUNTER (OUTPATIENT)
Dept: PHYSICAL THERAPY | Age: 76
Setting detail: THERAPIES SERIES
Discharge: HOME | End: 2021-06-04
Payer: MEDICARE

## 2021-06-04 PROCEDURE — 97113 AQUATIC THERAPY/EXERCISES: CPT

## 2021-06-04 ASSESSMENT — PAIN SCALES - GENERAL: PAINLEVEL_OUTOF10: 4

## 2021-06-04 ASSESSMENT — PAIN DESCRIPTION - LOCATION: LOCATION: BACK

## 2021-06-04 NOTE — PROGRESS NOTES
further address strength, balance, and LBP. Pt still reporting noteable decreases in LBP following pool sessions. Pt anticipates looking into rec membership to continue pool exercises independently. Treatment Diagnosis: low back pain, LE weakness, neck pain, impaired daily activity tolerance, impaired gait          Goals:  Short term goals  Time Frame for Short term goals: 3  Short term goal 1: Pt will report at least 50% decrease in LBP on days participating in aquatic therapy    Long term goals  Time Frame for Long term goals : 6  Long term goal 1: Independent and compliant with aquatic and land based HEPs to continue physical activity post-discharge  Long term goal 2: LE strength at least 4+/5 to ambulate up/down 8\" steps with HR support  Long term goal 3: Pt will complete 6' walk test with LRAD displaying good stability throughout duration of test to demo improved endurance. Long term goal 4: Cervical AROM WNL w/o discomfort to improve driving tolerance  Progress toward goals: cont toward all     POST-PAIN       Pain Rating (0-10 pain scale): \"I feel okay, better\"   Location and pain description same as pre-treatment unless indicated. Action: [] NA   [] Perform HEP  [x] Meds as prescribed  [] Modalities as prescribed   [] Call Physician     Frequency/Duration:  Plan  Times per week: 2-3  Plan weeks: 6 weeks  Specific instructions for Next Treatment: PN for POC extension  Current Treatment Recommendations: Aquatics, ROM, Strengthening, Balance Training, Functional Mobility Training, Home Exercise Program, Modalities, Manual Therapy - Soft Tissue Mobilization, Manual Therapy - Joint Manipulation  Plan Comment: Will continue POC via land txs after NV     Pt to continue current HEP. See objective section for any therapeutic exercise changes, additions or modifications this date.        PT Individual Minutes  Time In: 1340  Time Out: 4726  Minutes: 40  Timed Code Treatment Minutes: 38 Minutes  Procedure Minutes: 0     Timed Activity Minutes Units   Ther Ex (aquatic) 38 3       Signature:  Electronically signed by Jayden Win PT on 6/4/21 at 4:39 PM EDT

## 2021-06-07 ENCOUNTER — HOSPITAL ENCOUNTER (OUTPATIENT)
Dept: PHYSICAL THERAPY | Age: 76
Setting detail: THERAPIES SERIES
Discharge: HOME | End: 2021-06-07
Payer: MEDICARE

## 2021-06-07 PROCEDURE — 97113 AQUATIC THERAPY/EXERCISES: CPT

## 2021-06-07 ASSESSMENT — PAIN DESCRIPTION - ORIENTATION: ORIENTATION: LOWER

## 2021-06-07 ASSESSMENT — PAIN DESCRIPTION - LOCATION: LOCATION: BACK

## 2021-06-07 ASSESSMENT — PAIN DESCRIPTION - PAIN TYPE: TYPE: CHRONIC PAIN

## 2021-06-07 ASSESSMENT — PAIN DESCRIPTION - DESCRIPTORS: DESCRIPTORS: ACHING

## 2021-06-07 ASSESSMENT — PAIN SCALES - GENERAL: PAINLEVEL_OUTOF10: 2

## 2021-06-07 NOTE — PROGRESS NOTES
[address] [address]  Outpatient Physical Therapy    Treatment Note        Date: 2021  Patient: Alton Pierce  : 1945  ACCT #: [de-identified]  Referring Practitioner: Jumana Louis MD  Diagnosis: cervicalgia, low back pain    Visit Information:  PT Visit Information  PT Insurance Information: Medicare  Total # of Visits Approved: 80 (BMN)  Total # of Visits to Date: 8  Plan of Care/Certification Expiration Date: 21  No Show: 1  Canceled Appointment: 4  Progress Note Counter: -10 (PN by 21)    Subjective: Pt reports to LBP of 2/10     HEP Compliance:  [x] Good [] Fair [] Poor [] Reports not doing due to:    Vital Signs  Patient Currently in Pain: Yes   Pain Screening  Patient Currently in Pain: Yes  Pain Assessment  Pain Assessment: 0-10  Pain Level: 2  Pain Type: Chronic pain  Pain Location: Back  Pain Orientation: Lower  Pain Descriptors: Aching    OBJECTIVE:   Exercises  Exercise 1: Aquatics  Exercise 2: ambulation: F, L, R x4 laps each  Exercise 5: deep end bicycles x 4 min  Exercise 6: deep end hip abd/add flutter kicks x 4 min  Exercise 7: deep end hip flex/ext flutter kicks x 4 min    Strength: [] NT  [x] MMT completed:  Strength RLE  Comment: Hip: 4 ; knee: 4/5  Strength LLE  Comment: Hip: 3+ to 4- ; knee: 45      ROM: [] NT  [x] ROM measurements:     Spine  Cervical: dull pain with Rot R and SB L, Raul Rot ~75% WNL      *Indicates exercise, modality, or manual techniques to be initiated when appropriate    Assessment: Body structures, Functions, Activity limitations: Increased pain, Decreased strength, Decreased ROM, Decreased functional mobility , Decreased high-level IADLs  Assessment: Pt reports to 50% decrease in LBP on the days she participates in aquatic therapy. Pt has shown some improvement with LE strength and ROM. Pt is independent with aquatic HEP and has shown improvement and will continue to progress to land therex.   Treatment Diagnosis: low back pain, LE

## 2021-06-07 NOTE — PROGRESS NOTES
Shweta christy, [address]     Ph: [phone]  Fax: [phone]    [] Certification  [x] Recertification []  Plan of Care  [x] Progress Note [] Discharge      To:  Socorro Almaraz MD      From:  Kishore Knapp, PT, DPT  Patient: Lay Ireland     : 1945  Diagnosis: cervicalgia, low back pain     Date: 2021  Treatment Diagnosis: low back pain, LE weakness, neck pain, impaired daily activity tolerance, impaired gait    Plan of Care/Certification Expiration Date: 21  Progress Report Period from:  2021  to 2021    Total # of Visits to Date: 8   No Show: 1    Canceled Appointment: 4     OBJECTIVE:   Short Term Goals - Time Frame for Short term goals: 3    Goals Current/Discharge status  Met   Short term goal 1: Pt will report at least 50% decrease in LBP on days participating in aquatic therapy  50% decrease in LBP on days participating in aquatic therapy [x] yes  [] no                         Long Term Goals - Time Frame for Long term goals : 6  Goals Current/ Discharge status Met   Long term goal 1: Independent and compliant with aquatic and land based HEPs to continue physical activity post-discharge Independent with aquatic HEP    Pt to advance to land tx to further establish HEP   [] yes  [x] no   Long term goal 2: LE strength at least 4+/5 to ambulate up/down 8\" steps with HR support Strength RLE  Comment: Hip: 4 ; knee: 4/5  Strength LLE  Comment: Hip: 3+ to 4- ; knee: 45    [] yes  [x] no   Long term goal 3: Pt will complete 6' walk test with LRAD displaying good stability throughout duration of test to demo improved endurance.  N/T due to doing aquatic therapy [] yes  [x] no   Long term goal 4: Cervical AROM WNL w/o discomfort to improve driving tolerance Spine  Cervical: dull pain with Rot R and SB L, Raul Rot ~75% WNL   [] yes  [x] no                      Body structures, Functions, Activity limitations: Increased pain, Decreased strength, Decreased ROM, Decreased functional mobility , Decreased high-level IADLs  Assessment: Pt reports to 50% decrease in LBP on the days she participates in aquatic therapy. Pt has shown some improvement with LE strength and ROM. Pt is independent with aquatic HEP and has shown improvement and will continue to progress to land therapy sessions to further address ROM, strength, and HEP for improved daily activity tolerance and function. Discharge Recommendations: Continue to assess pending progress           PLAN:   Frequency/Duration:  Plan  Times per week: 2-3  Plan weeks: 6 weeks  Current Treatment Recommendations: Aquatics, ROM, Strengthening, Balance Training, Functional Mobility Training, Home Exercise Program, Modalities, Manual Therapy - Soft Tissue Mobilization, Manual Therapy - Joint Manipulation  Plan Comment: Will continue POC via land txs; additional 2x/wk for 4-8 weeks     Precautions:   Fall risk                         Patient Status:[x] Continue/ Initiate plan of Care    [] Discharge PT. Recommend pt continue with HEP. [x] Additional visits requested, Please re-certify for additional visits:          Signature: Electronically signed by Opal Zarate PTA on 6/7/21 at 4:37 PM EDT  Signature:  Electronically signed by Larissa Gonzalez PT on 6/7/2021 at 6:05 PM        If you have any questions or concerns, please don't hesitate to call. Thank you for your referral.    I have reviewed this plan of care and certify a need for medically necessary rehabilitation services.     Physician Signature:__________________________________________________________  Date:  Please sign and return

## 2021-06-15 ENCOUNTER — HOSPITAL ENCOUNTER (OUTPATIENT)
Dept: PHYSICAL THERAPY | Age: 76
Setting detail: THERAPIES SERIES
Discharge: HOME | End: 2021-06-15
Payer: MEDICARE

## 2021-06-15 NOTE — PROGRESS NOTES
Therapy                            Cancellation/No-show Note      Date:  6/15/2021  Patient Name:  Orlin Oseguera  :  1945   MRN:  [id number]  Referring Practitioner: Antonia Koenig MD  Diagnosis: cervicalgia, low back pain    Visit Information:  PT Visit Information  PT Insurance Information: Medicare  Total # of Visits Approved: 80 (BMN)  Total # of Visits to Date: 8  Plan of Care/Certification Expiration Date: 21  No Show: 1  Canceled Appointment: 5  Progress Note Counter: -10 (PN by 21) Called to cx., sick    For today's appointment patient:  [x]  Cancelled  []  Rescheduled appointment  []  No-show   []  Called pt to remind of next appointment     Reason given by patient:  [x]  Patient ill  []  Conflicting appointment  []  No transportation    []  Conflict with work  []  No reason given  []  Other:      [x] Pt has future appointments scheduled, no follow up needed  [] Pt requests to be on hold.     Reason:   If > 2 weeks please discuss with therapist.  [] Therapist to call pt for follow up     Comments:       Signature: Electronically signed by Anamaria Paz PTA on 6/15/21 at 1:10 PM EDT

## 2021-06-22 ENCOUNTER — HOSPITAL ENCOUNTER (OUTPATIENT)
Dept: PHYSICAL THERAPY | Age: 76
Setting detail: THERAPIES SERIES
Discharge: HOME | End: 2021-06-22
Payer: MEDICARE

## 2021-06-22 PROCEDURE — 97110 THERAPEUTIC EXERCISES: CPT

## 2021-06-22 ASSESSMENT — PAIN DESCRIPTION - PAIN TYPE: TYPE: CHRONIC PAIN

## 2021-06-22 ASSESSMENT — PAIN DESCRIPTION - FREQUENCY: FREQUENCY: CONTINUOUS

## 2021-06-22 ASSESSMENT — PAIN DESCRIPTION - ORIENTATION: ORIENTATION: LOWER

## 2021-06-22 ASSESSMENT — PAIN DESCRIPTION - DESCRIPTORS: DESCRIPTORS: ACHING

## 2021-06-22 ASSESSMENT — PAIN DESCRIPTION - LOCATION: LOCATION: BACK

## 2021-06-22 ASSESSMENT — PAIN SCALES - GENERAL: PAINLEVEL_OUTOF10: 3

## 2021-06-22 NOTE — PROGRESS NOTES
[id number] [address]  Outpatient Physical Therapy    Treatment Note        Date: 2021  Patient: Krishna Carl  : 1945  ACCT #: [de-identified]  Referring Practitioner: Mortimer Gerald, MD  Diagnosis: cervicalgia, low back pain       Visit Information:  PT Visit Information  PT Insurance Information: Medicare  Total # of Visits Approved: 80 (BMN)  Total # of Visits to Date: 9  Plan of Care/Certification Expiration Date: 21  No Show: 2  Canceled Appointment: 5  Progress Note Counter: -8  (PN by 21)    Subjective: Pt presenting to land today stating 3/10 pain in LB currently. HEP Compliance:  [x] Good [] Fair [] Poor [] Reports not doing due to:     Vital Signs  Patient Currently in Pain: Yes   Pain Screening  Patient Currently in Pain: Yes  Pain Assessment  Pain Assessment: 0-10  Pain Level: 3  Pain Type: Chronic pain  Pain Location: Back  Pain Orientation: Lower  Pain Descriptors: Aching  Pain Frequency: Continuous    OBJECTIVE:   Exercises  Exercise 10: Scifit L1 5 min  Exercise 11: Gentle UT stretch 3x30\" b/l w/o OP  Exercise 12: Posture exs: scap retractx, shrugs  Exercise 13: Cervical AROM: x10 ea  Exercise 14: Seated HS stretch 3x30\" B  Exercise 15: TB rows/lats x10 ea YTB  Exercise 20: HEP: cervical AROM, UT stretch, TB rows/lats     Strength: [x] NT  [] MMT completed:     ROM: [x] NT  [] ROM measurements:     Modalities:  Modalities  Other: Pt declined- pt has CP and HP at home     *Indicates exercise, modality, or manual techniques to be initiated when appropriate    Assessment: Body structures, Functions, Activity limitations: Increased pain, Decreased strength, Decreased ROM, Decreased functional mobility , Decreased high-level IADLs  Assessment: Pt transitioning from aquatic to land therpay today w/ focus on advancement of mobility/strength program to ease neck/LBP.  Multiple exs introduced to aid cervical mobility and postural strength as pt reports most limitation and aggrivating factors while driving, utilizing sewing machine and sitting at computer desk. Good yoon to tx w/o complaint  therefore HEP issued to inc home compliance. Goals:  Short term goals  Time Frame for Short term goals: 3  Short term goal 1: Pt will report at least 50% decrease in LBP on days participating in aquatic therapy  Long term goals  Time Frame for Long term goals : 6  Long term goal 1: Independent and compliant with aquatic and land based HEPs to continue physical activity post-discharge  Long term goal 2: LE strength at least 4+/5 to ambulate up/down 8\" steps with HR support  Long term goal 3: Pt will complete 6' walk test with LRAD displaying good stability throughout duration of test to demo improved endurance. Long term goal 4: Cervical AROM WNL w/o discomfort to improve driving tolerance  Progress toward goals: LE strength, cervical mobility     POST-PAIN       Pain Rating (0-10 pain scale):   0/10   Location and pain description same as pre-treatment unless indicated. Action: [x] NA   [] Perform HEP  [] Meds as prescribed  [] Modalities as prescribed   [] Call Physician     Frequency/Duration:  Plan  Times per week: 2-3  Plan weeks: 6 weeks  Specific instructions for Next Treatment: Pt expected to be leaving for 1 month vacation on 7/13/21  Current Treatment Recommendations: Aquatics, ROM, Strengthening, Balance Training, Functional Mobility Training, Home Exercise Program, Modalities, Manual Therapy - Soft Tissue Mobilization, Manual Therapy - Joint Manipulation     Pt to continue current HEP. See objective section for any therapeutic exercise changes, additions or modifications this date.      PT Individual Minutes  Time In: 0269  Time Out: 6596  Minutes: 38  Timed Code Treatment Minutes: 38 Minutes  Procedure Minutes: N/A      Timed Activity Minutes Units   Ther Ex 38 3     Signature:  Electronically signed by Rogelio Whitaker PTA on 6/22/21 at 5:27 PM EDT

## 2021-06-25 ENCOUNTER — HOSPITAL ENCOUNTER (OUTPATIENT)
Dept: PHYSICAL THERAPY | Age: 76
Setting detail: THERAPIES SERIES
Discharge: HOME | End: 2021-06-25
Payer: MEDICARE

## 2021-06-25 PROCEDURE — 97110 THERAPEUTIC EXERCISES: CPT

## 2021-06-25 NOTE — PROGRESS NOTES
[address] [address]  Outpatient Physical Therapy    Treatment Note        Date: 2021  Patient: Keya Rudd  : 1945  ACCT #: [de-identified]  Referring Practitioner: Tg Aguilar MD  Diagnosis: cervicalgia, low back pain       Visit Information:  PT Visit Information  PT Insurance Information: Medicare  Total # of Visits Approved: 80 (BMN)  Total # of Visits to Date: 10  Plan of Care/Certification Expiration Date: 21  No Show: 2  Canceled Appointment: 5  Progress Note Counter: -8  (PN by 21)    Subjective: Pt states \"My legs ache, other than that I'm fine. I tend to get like that when the weather is like this (rainy). \" Pt reports good yoon to last tx w/o significant soreness the following day. HEP Compliance:  [x] Good [] Fair [] Poor [] Reports not doing due to:    Vital Signs  Patient Currently in Pain: Denies   Pain Screening  Patient Currently in Pain: Denies    OBJECTIVE:   Exercises  Exercise 10: Scifit L1.5 5 min  Exercise 11: Gentle UT stretch 3x30\" b/l w/o OP  Exercise 12: Posture exs: scap retractx, shrugs  Exercise 13: Cervical AROM: x10 ea  Exercise 14: Seated HS stretch 3x30\" B  Exercise 15: TB rows/lats x10 ea YTB  Exercise 16: Supine chest pulls x10 (IR/ER orientation)  Exercise 17: SLR and S/L hip ABD x10  Exercise 20: HEP: SLR, S/L hip ABD     Strength: [x] NT  [] MMT completed:      ROM: [x] NT  [] ROM measurements:    Assessment: Body structures, Functions, Activity limitations: Increased pain, Decreased strength, Decreased ROM, Decreased functional mobility , Decreased high-level IADLs  Assessment: Addition of supine TB chest pulls, SLR's and S/L hip ABD to work towards strength goals in support of cervical/lumbar spine. Fair yoon to [employer] and S/L ABD D/T c/o frequent muscle cramping in LE's and SOB this date.      Goals:  Short term goals  Time Frame for Short term goals: 3  Short term goal 1: Pt will report at least 50% decrease in LBP on days

## 2021-06-29 ENCOUNTER — HOSPITAL ENCOUNTER (OUTPATIENT)
Dept: PHYSICAL THERAPY | Age: 76
Setting detail: THERAPIES SERIES
Discharge: HOME | End: 2021-06-29
Payer: MEDICARE

## 2021-06-29 PROCEDURE — 97110 THERAPEUTIC EXERCISES: CPT

## 2021-06-29 PROCEDURE — 97116 GAIT TRAINING THERAPY: CPT

## 2021-06-29 ASSESSMENT — PAIN DESCRIPTION - LOCATION: LOCATION: BACK

## 2021-06-29 ASSESSMENT — PAIN DESCRIPTION - DESCRIPTORS: DESCRIPTORS: DULL;ACHING

## 2021-06-29 ASSESSMENT — PAIN DESCRIPTION - ORIENTATION: ORIENTATION: LOWER;RIGHT

## 2021-06-29 ASSESSMENT — PAIN DESCRIPTION - PAIN TYPE: TYPE: CHRONIC PAIN

## 2021-06-29 ASSESSMENT — PAIN SCALES - GENERAL: PAINLEVEL_OUTOF10: 6

## 2021-06-29 NOTE — PROGRESS NOTES
[address] [address]  Outpatient Physical Therapy    Treatment Note        Date: 2021  Patient: Kaiden Orellana  : 1945  ACCT #: [de-identified]  Referring Practitioner: Sonja River MD  Diagnosis: cervicalgia, low back pain  Treatment Diagnosis: low back pain, LE weakness, neck pain, impaired daily activity tolerance, impaired gait    Visit Information:  PT Visit Information  PT Insurance Information: Medicare  Total # of Visits Approved: 80 (BMN)  Total # of Visits to Date: 6  Plan of Care/Certification Expiration Date: 21  No Show: 2  Canceled Appointment: 5  Progress Note Counter: 3/6-8  (PN by 21)    Subjective: Pt states she lifted a box over her head helping GD move , Rt LBP x 2 days     HEP Compliance:  [x] Good [x] Fair [] Poor [] Reports not doing due to:    Vital Signs  Patient Currently in Pain: Yes   Pain Screening  Patient Currently in Pain: Yes  Pain Assessment  Pain Assessment: 0-10  Pain Level: 6  Pain Type: Chronic pain  Pain Location: Back  Pain Orientation: Lower;Right  Pain Descriptors: Dull;Aching    OBJECTIVE:   Exercises  Exercise 10: Scifit L1.5 5 min  Exercise 11: Gentle UT stretch 3x30\" b/l w/o OP  Exercise 12: Posture exs: scap retractx, shrugs, 3-5\" x 10 ea  Exercise 13: Cervical AROM: x10 ea  Exercise 15: TB rows/lats x15 ea YTB  Exercise 16: Supine chest pulls x10 (IR/ER orientation)  Exercise 17: SLR and S/L hip ABD x10  Exercise 18: 6 MWT= 765FT, 2 laps w/o rollator, dec'd endurance , 4 1/2 laps with Rollator  Strength: [x] NT  [] MMT completed:   ROM: [x] NT  [] ROM measurements:      Manual:   Manual therapy  Soft Tissue Mobalization: STM rt Lumbar, rt glut medius x 5min    Modalities: Will ice at home        *Indicates exercise, modality, or manual techniques to be initiated when appropriate    Assessment:         Body structures, Functions, Activity limitations: Increased pain, Decreased strength, Decreased ROM, Decreased functional mobility , Decreased high-level IADLs  Assessment: Continued strengthening and ROM ex's w/o complaint. Initiated 6 MWT to assess endurance and stability with pt attempting w/o rollator. Pt required AD after 2 laps d/t decreased endurance and dyspnea on excertion. Improved endurance with rollator. Initiated STM to Rt LB and glute medius to decrease spasm and pain. Treatment Diagnosis: low back pain, LE weakness, neck pain, impaired daily activity tolerance, impaired gait          Goals:  Short term goals  Time Frame for Short term goals: 3  Short term goal 1: Pt will report at least 50% decrease in LBP on days participating in aquatic therapy    Long term goals  Time Frame for Long term goals : 6  Long term goal 1: Independent and compliant with aquatic and land based HEPs to continue physical activity post-discharge  Long term goal 2: LE strength at least 4+/5 to ambulate up/down 8\" steps with HR support  Long term goal 3: Pt will complete 6' walk test with LRAD displaying good stability throughout duration of test to demo improved endurance. Long term goal 4: Cervical AROM WNL w/o discomfort to improve driving tolerance  Progress toward goals:rom, strength, endurance    POST-PAIN       Pain Rating (0-10 pain scale):  3 /10   Location and pain description same as pre-treatment unless indicated. Action: [] NA   [] Perform HEP  [] Meds as prescribed  [x] Modalities as prescribed   [] Call Physician     Frequency/Duration:  Plan  Times per week: 2-3  Plan weeks: 6 weeks  Specific instructions for Next Treatment: Pt expected to be leaving for 1 month vacation on 7/13/21  Current Treatment Recommendations: Aquatics, ROM, Strengthening, Balance Training, Functional Mobility Training, Home Exercise Program, Modalities, Manual Therapy - Soft Tissue Mobilization, Manual Therapy - Joint Manipulation     Pt to continue current HEP. See objective section for any therapeutic exercise changes, additions or modifications this date.          PT

## 2021-07-02 ENCOUNTER — HOSPITAL ENCOUNTER (OUTPATIENT)
Dept: PHYSICAL THERAPY | Age: 76
Setting detail: THERAPIES SERIES
Discharge: HOME | End: 2021-07-02
Payer: MEDICARE

## 2021-07-02 NOTE — PROGRESS NOTES
Therapy                            Cancellation/No-show Note      Date:  2021  Patient Name:  Yenifer Alvarenga  :  1945   MRN:  [id number]  Referring Practitioner: Corie Rodriguez MD  Diagnosis: cervicalgia, low back pain    Visit Information:  PT Visit Information  PT Insurance Information: Medicare  Total # of Visits Approved: 80 (BMN)  Total # of Visits to Date: 6  Plan of Care/Certification Expiration Date: 21  No Show: 2  Canceled Appointment: 6  Progress Note Counter: 3/6-  (PN by 21) cx on 21    For today's appointment patient:  [x]  Cancelled  []  Rescheduled appointment  []  No-show   []  Called pt to remind of next appointment     Reason given by patient:  []  Patient ill  []  Conflicting appointment  []  No transportation    []  Conflict with work  []  No reason given  [x]  Other:  Going out of town    [x] Pt has future appointments scheduled, no follow up needed  [] Pt requests to be on hold.     Reason:   If > 2 weeks please discuss with therapist.  [] Therapist to call pt for follow up     Comments:       Signature: Electronically signed by Mackenzie Patel PTA on 21 at 10:46 AM EDT

## 2021-07-06 ENCOUNTER — HOSPITAL ENCOUNTER (OUTPATIENT)
Dept: PHYSICAL THERAPY | Age: 76
Setting detail: THERAPIES SERIES
Discharge: HOME | End: 2021-07-06
Payer: MEDICARE

## 2021-07-06 PROCEDURE — 97110 THERAPEUTIC EXERCISES: CPT

## 2021-07-06 ASSESSMENT — PAIN DESCRIPTION - ORIENTATION: ORIENTATION: LOWER

## 2021-07-06 ASSESSMENT — PAIN SCALES - GENERAL: PAINLEVEL_OUTOF10: 4

## 2021-07-06 ASSESSMENT — PAIN DESCRIPTION - PAIN TYPE: TYPE: CHRONIC PAIN

## 2021-07-06 ASSESSMENT — PAIN DESCRIPTION - LOCATION: LOCATION: BACK

## 2021-07-06 ASSESSMENT — PAIN DESCRIPTION - DESCRIPTORS: DESCRIPTORS: DULL

## 2021-07-06 NOTE — PROGRESS NOTES
[address] [address]  Outpatient Physical Therapy    Treatment Note        Date: 2021  Patient: Gaviota Sanders  : 1945  ACCT #: [de-identified]  Referring Practitioner: Dayana Arredondo MD  Diagnosis: cervicalgia, low back pain       Visit Information:  PT Visit Information  PT Insurance Information: Medicare  Total # of Visits Approved: 80 (BMN)  Total # of Visits to Date: 15  Plan of Care/Certification Expiration Date: 21  No Show: 2  Canceled Appointment: 6  Progress Note Counter: -8  (PN by 21)    Subjective: Pt reports 4/10 LBP dull. Going out of town for 4 weeks will come back after. HEP Compliance:  [x] Good [] Fair [] Poor [] Reports not doing due to:    Vital Signs  Patient Currently in Pain: Yes   Pain Screening  Patient Currently in Pain: Yes  Pain Assessment  Pain Level: 4  Patient's Stated Pain Goal: No pain  Pain Type: Chronic pain  Pain Location: Back  Pain Orientation: Lower  Pain Descriptors: Dull    OBJECTIVE:   Exercises  Exercise 10: Scifit L1.8 5 min  Exercise 11: Gentle UT stretch 3x30\" b/l w/o OP  Exercise 14: Seated HS stretch 3x30\" B  Exercise 17: SLR and S/L hip ABD x15  Exercise 18: 6 MWT= 605 FT, 5 laps without device with 45 sec RB secondary to SOB. Exercise 19: Objective measurements. Ambulation 1  Quality of Gait: Increased ataxia with fatigue. Min to start mild at end. Gait Deviations: Slow Anamika, Decreased step length, Decreased step height  Distance: 270', 345'  Comments: 6 MWT= 605 FT, 5 laps without device with 45 sec RB secondary to SOB.          Stairs  # Steps : 8  Stairs Height: 6\"  Rails: Bilateral  Assistance: Modified independent   Comment: reciprocal.                Strength: [] NT  [x] MMT completed:  Strength RLE  Comment: 4+/5 Knee flex & Ext, 4-4+/5 Hip flex IR, 4/5 Abd ER hip ext  Strength LLE  Comment: 4+/5 Hip Flex Knee Flex & Ext, 4-4+/5 Hip Ext Knee flex & ext, 4/5 Abd IR & ER             ROM: [x] NT  [] ROM measurements: *Indicates exercise, modality, or manual techniques to be initiated when appropriate    Assessment: Body structures, Functions, Activity limitations: Increased pain, Decreased strength, Decreased ROM, Decreased functional mobility , Decreased high-level IADLs  Assessment: Continu to progress current exercises focus on strength & endurance. Pt states feels 65-70 % better since start of therapy. Reports beathing biggest obsticle more than strength. States compliance and demo's good knowledge of exercises. Goals:  Short term goals  Time Frame for Short term goals: 3  Short term goal 1: Pt will report at least 50% decrease in LBP on days participating in aquatic therapy    Long term goals  Time Frame for Long term goals : 6  Long term goal 1: Independent and compliant with aquatic and land based HEPs to continue physical activity post-discharge  Long term goal 2: LE strength at least 4+/5 to ambulate up/down 8\" steps with HR support  Long term goal 3: Pt will complete 6' walk test with LRAD displaying good stability throughout duration of test to demo improved endurance. Long term goal 4: Cervical AROM WNL w/o discomfort to improve driving tolerance  Progress toward goals: Progressing towards goals. POST-PAIN       Pain Rating (0-10 pain scale):  2-3 /10   Location and pain description same as pre-treatment unless indicated. Action: [] NA   [x] Perform HEP  [] Meds as prescribed  [x] Modalities as prescribed   [] Call Physician     Frequency/Duration:  Plan  Times per week: 2-3  Plan weeks: 6 weeks  Specific instructions for Next Treatment: Pt expected to be leaving for 1 month vacation on 7/13/21  Current Treatment Recommendations: Aquatics, ROM, Strengthening, Balance Training, Functional Mobility Training, Home Exercise Program, Modalities, Manual Therapy - Soft Tissue Mobilization, Manual Therapy - Joint Manipulation     Pt to continue current HEP.   See objective section for any therapeutic exercise changes, additions or modifications this date.          PT Individual Minutes  Time In: 2481  Time Out: 1620  Minutes: 40  Timed Code Treatment Minutes: 40 Minutes  Procedure Minutes:0     Timed Activity Minutes Units   Ther Ex 40 3     Signature:  Electronically signed by Mariajose Soto PTA on 7/6/21 at 4:31 PM EDT

## 2021-07-06 NOTE — PROGRESS NOTES
WNL. As of 6/7/21 [] yes  [] no     Body structures, Functions, Activity limitations: Increased pain, Decreased strength, Decreased ROM, Decreased functional mobility , Decreased high-level IADLs  Assessment: Continue to progress current exercise program with focus on strength & endurance. Pt states feels 65-70% better since start of therapy. Reports breathing biggest obsticle more than strength. States compliance and demo's good knowledge of exercises. Pt expected to be leaving for 1 month vacation on 7/13/21. Will place patient on HOLD while she is out of town. PLAN:   Frequency/Duration:  Plan  Times per week: 2-3  Plan weeks: 6 weeks  Specific instructions for Next Treatment: Pt expected to be leaving for 1 month vacation on 7/13/21  Current Treatment Recommendations: Aquatics, ROM, Strengthening, Balance Training, Functional Mobility Training, Home Exercise Program, Modalities, Manual Therapy - Soft Tissue Mobilization, Manual Therapy - Joint Manipulation                             Patient Status:[x] Therapy hold while patient out of town          Signature: Electronically signed by Melanie Kinney PTA on 7/6/21 at 4:35 PM EDT  Signature: Electronically signed by Love Claude, PT on 7/7/2021 at 3:29 PM      If you have any questions or concerns, please don't hesitate to call. Thank you for your referral.    I have reviewed this plan of care and certify a need for medically necessary rehabilitation services.     Physician Signature:__________________________________________________________  Date:  Please sign and return

## 2021-07-07 NOTE — PROGRESS NOTES
Therapy                            Cancellation/No-show Note      Date:  2021  Patient Name:  Al Solis  :  1945   MRN:  [id number]          Visit Information:  PT Visit Information  PT Insurance Information: Medicare  Total # of Visits Approved: 80 (BMN)  Total # of Visits to Date: 12  Plan of Care/Certification Expiration Date: 21  No Show: 2  Canceled Appointment: 7  Progress Note Counter: -8  (PN by 21) Called today to cx. 21 appt, going out of town till 21    For today's appointment patient:  [x]  Cancelled  []  Rescheduled appointment  []  No-show   []  Called pt to remind of next appointment     Reason given by patient:  []  Patient ill  []  Conflicting appointment  []  No transportation    []  Conflict with work  []  No reason given  [x]  Other:      [x] Pt has future appointments scheduled, no follow up needed  [] Pt requests to be on hold.     Reason:   If > 2 weeks please discuss with therapist.  [] Therapist to call pt for follow up     Comments:       Signature: Electronically signed by Brisa Chatman PTA on 21 at 2:37 PM EDT

## 2021-07-09 ENCOUNTER — HOSPITAL ENCOUNTER (OUTPATIENT)
Dept: PHYSICAL THERAPY | Age: 76
Setting detail: THERAPIES SERIES
Discharge: HOME | End: 2021-07-09
Payer: MEDICARE

## 2021-09-10 NOTE — PROGRESS NOTES
[address]                                  Ph: [phone]  Fax: [phone]     []? Certification  []? Recertification      []? Plan of Care  []? Progress Note [x]? Discharge                            To:  Thania Baugh MD        From:  Margarita Troncoso, PT, DPT  Patient: Ton Yan     : 1945  Diagnosis: cervicalgia, low back pain     Date: 9/10/2021     Plan of Care/Certification Expiration Date: 21  Progress Report Period from:  2021  to 78/10/2021     Total # of Visits to Date: 12   No Show: 3    Canceled Appointment: 7      OBJECTIVE: (measures below obtained 21)  Short Term Goals - Time Frame for Short term goals: 3    Goals Current/Discharge status  Met   Short term goal 1: Pt will report at least 50% decrease in LBP on days participating in aquatic therapy  Independent with aquatic HEP for pain mgmt as needed []? yes  []? no      Long Term Goals - Time Frame for Long term goals : 6  Goals Current/ Discharge status Met   Long term goal 1: Independent and compliant with aquatic and land based HEPs to continue physical activity post-discharge States compliance and demo's good knowledge of aquatic exercises. [x]? yes  []? no   Long term goal 2: LE strength at least 4+/5 to ambulate up/down 8\" steps with HR support Strength RLE  Comment: 4+/5 Knee flex & Ext, 4- to 4+/5 Hip flex IR, 4/5 Abd ER hip ext     Strength LLE  Comment: 4+/5 Hip Flex Knee Flex & Ext, 4-4+/5 Hip Ext Knee flex & ext, 4/5 Abd IR & ER      Stairs  # Steps : 8  Stairs Height: 6\"  Rails: Bilateral  Assistance: Modified independent   Comment: reciprocal.    []? yes  [x]? no   Long term goal 3: Pt will complete 6' walk test with LRAD displaying good stability throughout duration of test to demo improved endurance. 6 MWT= 605 FT, 5 laps without device with 45 sec RB secondary to SOB.    [x]?  yes  [x]? no   Long

## 2021-10-12 ENCOUNTER — HOSPITAL ENCOUNTER (OUTPATIENT)
Dept: WOMENS IMAGING | Age: 76
Discharge: HOME | End: 2021-10-14
Payer: MEDICARE

## 2021-10-12 DIAGNOSIS — Z12.31 BREAST CANCER SCREENING BY MAMMOGRAM: ICD-10-CM

## 2021-10-12 DIAGNOSIS — Z12.31 ENCOUNTER FOR SCREENING MAMMOGRAM FOR MALIGNANT NEOPLASM OF BREAST: ICD-10-CM

## 2021-10-12 PROCEDURE — 77063 BREAST TOMOSYNTHESIS BI: CPT

## 2021-12-13 ENCOUNTER — HOSPITAL ENCOUNTER (EMERGENCY)
Age: 76
Discharge: HOME | End: 2021-12-13
Attending: EMERGENCY MEDICINE
Payer: MEDICARE

## 2021-12-13 VITALS
DIASTOLIC BLOOD PRESSURE: 82 MMHG | OXYGEN SATURATION: 98 % | HEIGHT: 66 IN | HEART RATE: 92 BPM | WEIGHT: 195 LBS | RESPIRATION RATE: 18 BRPM | BODY MASS INDEX: 31.34 KG/M2 | SYSTOLIC BLOOD PRESSURE: 164 MMHG | TEMPERATURE: 97.8 F

## 2021-12-13 DIAGNOSIS — S09.90XA INJURY OF HEAD, INITIAL ENCOUNTER: ICD-10-CM

## 2021-12-13 DIAGNOSIS — S01.01XA LACERATION OF SCALP, INITIAL ENCOUNTER: Primary | ICD-10-CM

## 2021-12-13 PROCEDURE — 99282 EMERGENCY DEPT VISIT SF MDM: CPT

## 2021-12-13 PROCEDURE — 12001 RPR S/N/AX/GEN/TRNK 2.5CM/<: CPT

## 2021-12-13 PROCEDURE — 12002 RPR S/N/AX/GEN/TRNK2.6-7.5CM: CPT

## 2021-12-13 ASSESSMENT — PAIN SCALES - GENERAL: PAINLEVEL_OUTOF10: 5

## 2021-12-13 ASSESSMENT — PAIN DESCRIPTION - DESCRIPTORS: DESCRIPTORS: ACHING

## 2021-12-13 ASSESSMENT — PAIN DESCRIPTION - LOCATION: LOCATION: HEAD

## 2021-12-13 NOTE — ED TRIAGE NOTES
Patient was closing the maurice of her SUV around 1400 today when it hit her in the head and she has a 4cm laceration to top of head that has gap to it, bleeding controlled. No LOC.

## 2021-12-14 NOTE — ED PROVIDER NOTES
eMERGENCY dEPARTMENT eNCOUnter      [address] [facility]    Chief Complaint   Patient presents with    Laceration     head no LOC       HPI    Sherri Huerta is a 68 y.o. female with history of anxiety depression hyperlipidemia, Ménière's disease, sleep apnea, pulmonary hypertension who presentsto ED from home  By private car  With complaint of head injury  Onset earlier today   intensity of symptoms mild  Patient was closing the kaminski of the car and hit the edge of the trunk onto her right scalp. Patient did not lose consciousness, denies headache, takes baby aspirin. Patient denies any neck pain. Patient went home and noticed bleeding on the scalp area and is here in the ED for staples. Patient's tetanus is up-to-date.     PAST MEDICAL HISTORY    Past Medical History:   Diagnosis Date    Anxiety     Depression     Hyperlipidemia     Meniere's disease     CARLOS (obstructive sleep apnea)     Pulmonary hypertension (HCC)        SURGICAL HISTORY    Past Surgical History:   Procedure Laterality Date    BREAST BIOPSY      BREAST LUMPECTOMY      CHOLECYSTECTOMY      COLONOSCOPY  12/21/15    w/polypectomy     HYSTERECTOMY         CURRENT MEDICATIONS    Current Outpatient Rx   Medication Sig Dispense Refill    sertraline (ZOLOFT) 50 MG tablet Take 50 mg by mouth      metoprolol tartrate (LOPRESSOR) 25 MG tablet TK 1 T PO BID  1    atorvastatin (LIPITOR) 80 MG tablet Take 80 mg by mouth daily      escitalopram (LEXAPRO) 10 MG tablet 10 mg daily       omeprazole (PRILOSEC) 20 MG delayed release capsule 20 mg Daily       aspirin 81 MG EC tablet Take 81 mg by mouth daily      meclizine (ANTIVERT) 25 MG tablet Take 25 mg by mouth 3 times daily as needed      atenolol (TENORMIN) 25 MG tablet Take 25 mg by mouth daily      triamterene-hydrochlorothiazide (MAXZIDE-25) 37.5-25 MG per tablet Take 1 tablet by mouth as needed       ranitidine (ZANTAC) 300 MG tablet TAKE 1 TABLET BY MOUTH EVERY NIGHT 90 tablet 3    CPAP Machine MISC by Does not apply route 1 each 0    ALPRAZolam (XANAX) 0.25 MG tablet 0.25 mg 2 times daily as needed .  nitroGLYCERIN (NITROSTAT) 0.4 MG SL tablet       ondansetron (ZOFRAN) 4 MG tablet Take 1 tablet by mouth every 8 hours as needed for Nausea 20 tablet 0       ALLERGIES    No Known Allergies    FAMILY HISTORY    Family History   Problem Relation Age of Onset    Brain Cancer Father     Breast Cancer Sister     Colon Cancer Maternal Grandfather        SOCIAL HISTORY    Social History     Socioeconomic History    Marital status:      Spouse name: None    Number of children: None    Years of education: None    Highest education level: None   Occupational History    None   Tobacco Use    Smoking status: Former Smoker     Quit date: 1986     Years since quittin.5    Smokeless tobacco: Never Used   Vaping Use    Vaping Use: Never used   Substance and Sexual Activity    Alcohol use: No    Drug use: No    Sexual activity: None   Other Topics Concern    None   Social History Narrative    None     Social Determinants of Health     Financial Resource Strain:     Difficulty of Paying Living Expenses: Not on file   Food Insecurity:     Worried About Running Out of Food in the Last Year: Not on file    Theresa of Food in the Last Year: Not on file   Transportation Needs:     Lack of Transportation (Medical): Not on file    Lack of Transportation (Non-Medical):  Not on file   Physical Activity:     Days of Exercise per Week: Not on file    Minutes of Exercise per Session: Not on file   Stress:     Feeling of Stress : Not on file   Social Connections:     Frequency of Communication with Friends and Family: Not on file    Frequency of Social Gatherings with Friends and Family: Not on file    Attends Restoration Services: Not on file    Active Member of Clubs or Organizations: Not on file    Attends Club or Organization Meetings: Not on file    Marital Status: Not on file   Intimate Partner Violence:     Fear of Current or Ex-Partner: Not on file    Emotionally Abused: Not on file    Physically Abused: Not on file    Sexually Abused: Not on file   Housing Stability:     Unable to Pay for Housing in the Last Year: Not on file    Number of [place] in the Last Year: Not on file    Unstable Housing in the Last Year: Not on file       REVIEW OF SYSTEMS    Constitutional:  Denies fever, chills, weight loss or weakness   Eyes:  Denies photophobia or discharge   HENT:  Denies sore throat or ear pain   Respiratory:  Denies cough or shortness of breath   Cardiovascular:  Denies chest pain, palpitations or swelling   GI:  Denies abdominal pain, nausea, vomiting, or diarrhea   Musculoskeletal:  Denies back pain   Skin:  Denies rash   Neurologic:  Denies headache, focal weakness or sensory changes   Endocrine:  Denies polyuria or polydypsia   Lymphatic:  Denies swollen glands   Psychiatric:  Denies depression, suicidal ideation or homicidal ideation   All systems negative except as marked. PHYSICAL EXAM    VITAL SIGNS: BP (!) 164/82   Pulse 92   Temp 97.8 °F (36.6 °C) (Temporal)   Resp 18   Ht 5' 6\" (1.676 m)   Wt 195 lb (88.5 kg)   LMP  (LMP Unknown)   SpO2 98%   BMI 31.47 kg/m²    Constitutional:  Well developed, Well nourished, No acute distress, Non-toxic appearance. HENT:  Normocephalic, right frontal scalp laceration about 1.5 inch, bleeding is controlled, no depressed skull fracture, bilateral external ears normal, Oropharynx moist, No oral exudates, Nose normal. Neck- Normal range of motion, No tenderness, Supple, No stridor. No midline C-spine tenderness. Eyes:  PERRL, EOMI, Conjunctiva normal, No discharge. Respiratory:  Normal breath sounds, No respiratory distress, No wheezing, No chest tenderness. Cardiovascular:  Normal heart rate, Normal rhythm, No murmurs, No rubs, No gallops.    GI:  Bowel sounds normal, Soft, No tenderness, No masses, No pulsatile masses. :  No CVA tenderness. Musculoskeletal:  Intact distal pulses, No edema, No tenderness, No cyanosis, No clubbing. Good range of motion in all major joints. No tenderness to palpation or major deformities noted. Back- No tenderness. Integument:  Warm, Dry, No erythema, No rash. Lymphatic:  No lymphadenopathy noted. Neurologic:  Alert & oriented x 3, Normal motor function, Normal sensory function, No focal deficits noted. Psychiatric:  Affect normal, Judgment normal, Mood normal.         RADIOLOGY    No orders to display       REEVALUATION   Discussed with patient regarding getting a CT scan of the head done. Patient states that she does not want a CT scan as her daughter is going to watch over her overnight and she is not having headache or neurologic deficit. PROCEDURES  Procedure Name: Laceration Repair  Location: Right frontal scalp 3  Pre-Procedure Diagnosis: Laceration  Post-Procedure Diagnosis: Repaired Laceration  Informed consent was obtained before procedure started. PROCEDURE:  The appropriate timeout was taken. The area was prepped and draped in the usual sterile fashion. Local anesthesia was achieved using 3 cc of Lidocaine 1% with  epinephrine. The wound was copiously irrigated. 3 staples were placed. Estimated blood loss was less than 1 mL. A dressing was applied to the area and anticipatory guidance, as well as standard post-procedure care, was explained. Return precautions are given. The patient tolerated the procedure well without complications. Labs  Labs Reviewed - No data to display          Summation      Patient Course:     ED Medications administered this visit:  Medications - No data to display    New Prescriptions from this visit:    New Prescriptions    No medications on file       Follow-up:  Jill Krabbe, [address]  [facility] [phone]    Call in 1 day          Final Impression:   1.  Laceration of scalp, initial encounter    2.  Injury of head, initial encounter               (Please note that portions of this note were completed with a voice recognition program.  Efforts were made to edit the dictations but occasionally words are mis-transcribed.)          Etta Dowling MD  12/13/21 1929

## 2022-05-20 ENCOUNTER — HOSPITAL ENCOUNTER (OUTPATIENT)
Dept: PHYSICAL THERAPY | Age: 77
Setting detail: THERAPIES SERIES
Discharge: HOME | End: 2022-05-20
Payer: MEDICARE

## 2022-05-20 PROCEDURE — 97162 PT EVAL MOD COMPLEX 30 MIN: CPT

## 2022-05-20 ASSESSMENT — PAIN SCALES - GENERAL: PAINLEVEL_OUTOF10: 4

## 2022-05-20 ASSESSMENT — PAIN DESCRIPTION - ORIENTATION: ORIENTATION: LOWER

## 2022-05-20 ASSESSMENT — PAIN DESCRIPTION - LOCATION: LOCATION: BACK

## 2022-05-20 NOTE — PROGRESS NOTES
[address]  PHYSICAL THERAPY EVALUATION      Physical Therapy: Initial Evaluation    Patient: Elsy Ng (81 y.o.     female)   Examination Date:   Plan of Care Certification Period: 2022 to 22  Progress Note Counter: 1/10   :  1945 ;    Confirmed: Yes MRN: [id number]  CSN: [id number]   Insurance: Payor: MEDICARE / Plan: MEDICARE PART A AND B / Product Type: *No Product type* /   Insurance ID: [id number] - (Medicare) Secondary Insurance (if applicable):  [facility]   Referring Physician: Deena Grigsby MD     PCP: Radha Frances MD Visits to Date/Visits Approved: 1 /      No Show/Cancelled Appts: 0 / 0     Medical Diagnosis: Low back pain, unspecified [M54.50]  Other abnormalities of gait and mobility [R26.89] LBP, Other abnormalities of gait and mobility  Treatment Diagnosis: Abnormal gait, Impaired balance     PERTINENT MEDICAL HISTORY           Medical History: Chart Reviewed: Yes   Past Medical History:   Diagnosis Date    Anxiety     Depression     Hyperlipidemia     Meniere's disease     CARLOS (obstructive sleep apnea)     Pulmonary hypertension ([facility] [address]      Surgical History:   Past Surgical History:   Procedure Laterality Date    BREAST BIOPSY      BREAST LUMPECTOMY      CHOLECYSTECTOMY      COLONOSCOPY  12/21/15    w/polypectomy     HYSTERECTOMY         Medications:   Current Outpatient Medications:     sertraline (ZOLOFT) 50 MG tablet, Take 50 mg by mouth, Disp: , Rfl:     ranitidine (ZANTAC) 300 MG tablet, TAKE 1 TABLET BY MOUTH EVERY NIGHT, Disp: 90 tablet, Rfl: 3    metoprolol tartrate (LOPRESSOR) 25 MG tablet, TK 1 T PO BID, Disp: , Rfl: 1    atorvastatin (LIPITOR) 80 MG tablet, Take 80 mg by mouth daily, Disp: , Rfl:     CPAP Machine MISC, by Does not apply route, Disp: 1 each, Rfl: 0    escitalopram (LEXAPRO) 10 MG tablet, 10 mg daily , Disp: , Rfl:     ALPRAZolam Aldon Nones) 0.25 MG tablet, 0.25 mg 2 times daily as needed . , Disp: , Rfl:     omeprazole (PRILOSEC) 20 MG delayed release capsule, 20 mg Daily , Disp: , Rfl:     nitroGLYCERIN (NITROSTAT) 0.4 MG SL tablet, , Disp: , Rfl:     aspirin 81 MG EC tablet, Take 81 mg by mouth daily, Disp: , Rfl:     meclizine (ANTIVERT) 25 MG tablet, Take 25 mg by mouth 3 times daily as needed, Disp: , Rfl:     atenolol (TENORMIN) 25 MG tablet, Take 25 mg by mouth daily, Disp: , Rfl:     triamterene-hydrochlorothiazide (MAXZIDE-25) 37.5-25 MG per tablet, Take 1 tablet by mouth as needed , Disp: , Rfl:     ondansetron (ZOFRAN) 4 MG tablet, Take 1 tablet by mouth every 8 hours as needed for Nausea, Disp: 20 tablet, Rfl: 0  Allergies: Patient has no known allergies. SUBJECTIVE EXAMINATION     History obtained from[de-identified] Patient,           Subjective History:    Subjective: Diagnosed with peripheral neuropathy about 2 years ago affecting Rt > Lt. Pt reports neuropathy was causing pt to be off balance. Had issues with standing up from a chair. Did PT 1x pool and 1x land and was doing better without a walker. Pt reports decreased activity during the winter and noticed more weakness and LBP. Scheduled on 7/16/22 for Lt TKR and has been putting more weight on her Rt leg. Pt reports doing ok with moving on solid ground but is challenged with anything uneven. Uses a rollator for walking at night or after sitting for long periods. Pt also needs rollator for longer distances.   Additional Pertinent Hx (if applicable): OA, HTN, Meniere's          Learning/Language: Learning  Does the patient/guardian have any barriers to learning?: No barriers  Will there be a co-learner?: No  What is the preferred language of the patient/guardian?: English  Is an  required?: No     Pain Screening    Pain Screening  Patient Currently in Pain: Yes  Pain Assessment: 0-10  Pain Level: 4  Best Pain Level: 3  Worst Pain Level: 8  Pain Location: Back  Pain Orientation: Lower    Functional Status    Social History:    Social History  Lives With: Alone  Type of Home: House  Home Layout: One level  Home Access: Level entry  Home Equipment: Rollator,Cane    Occupation/Interests:   Occupation: Retired    Prior Level of Function:     Independent        Current Level of Function:   ADL Assistance: Independent  Homemaking Assistance: Independent  Ambulation Assistance: Independent  Transfer Assistance: Independent  Active : Yes         OBJECTIVE EXAMINATION     Review of Systems:  Vision: Impaired  Visual Deficits: Wears glasses  Hearing: Exceptions to [facility]  Hearing Exceptions: Bilateral hearing aid  Overall Orientation Status: Within Normal Limits  Patient affect[de-identified] Normal  Follows Commands: Within Functional Limits    Mobility:   Bed mobility  Rolling to Left: Independent  Rolling to Right: Independent  Supine to Sit: Independent  Sit to Supine: Independent     Transfers  Sit to Stand: Supervision,Independent (Increased effort from chair without UEs)  Stand to sit: Supervision,Independent  Ambulation  Surface: carpet  Device: Rollator  Assistance: Independent  Quality of Gait: Steady, decreased liyah DF  Gait Deviations: Slow Anamika,Decreased step length,Decreased step height  Distance: 80'      Balance Screen:   Balance  Comments: Berrios = 41/56    Neuro Screen: Sensation  Overall Sensation Status: Impaired (N&T in hands and feet)      Left Strength  Right Strength         Strength LLE  Strength LLE: WFL  Comment: Except hip abd 4/5    Strength RLE  Strength RLE: WFL  Comment: Except hip abd 4+/5     Outcomes Score:  Exam: Decreased strength and balance impacting mobility and QOL.   Berrios = 41/56    5 Times Sit to Stand  5 TIMES SIT TO STAND: 9.26 seconds (from mat, from chair: 15.72sec)    Treatment:    Exercises:   Exercises  Exercise 1: Static standing*  Exercise 2: Foam*  Exercise 3: Single stepping*  Exercise 4: Figure 8*  Exercise 5: Amb on uneven surface*  Exercise 6: Amb over hurdles*  Exercise 7: Gait drills*  Exercise 8: 2 way SLR*  Exercise 9: Bridges*  Exercise 11: Aquatic:  Exercise 12: Gait drills*  Exercise 13: Sink ex*  Exercise 14: Hip circles*  Exercise 15: Step ups*  Exercise 16: Balance ex*     *Indicates exercise,modality, or manual techniques to be initiated when appropriate       ASSESSMENT     Impression: Assessment: Pt presents with a decline in her strength and mobility with ongoing LBP. Pt demonstrates decreased strength in liyah hips and decreased functional strength as seen with transfers. Pt is at an increased risk for falls per Berrios score due to instability with static standing activities. Pt's LBP likely exacerbated by changes to her gait quality due to Lt knee pain. Pt ambulates with a rollator in the clinic with slight decreased liyah foot clearance and a slow gait speed. Pt would benefit from further skilled PT to improve her strength, balance and mobility to increase her safety at home. Body Structures, Functions, Activity Limitations Requiring Skilled Therapeutic Intervention: Decreased functional mobility ,Decreased strength,Decreased endurance,Decreased balance,Increased pain    Statement of Medical Necessity: Physical Therapy is both indicated and medically necessary as outlined in the POC to increase the likelihood of meeting the functionally related goals stated below. Patient's Activity Tolerance: Patient tolerated evaluation without incident      Patient's rehabilitation potential/prognosis is considered to be: Good     Patient Education: Vidal Paez of Care,Evaluative findings      GOALS   Patient Goal(s): Patient goals : Strengthen legs, decrease back pain    Short Term Goals Completed by 2 weeks Goal Status   Independent and compliant with HEP. New       Long Term Goals Completed by 5 weeks Goal Status   Improve liyah LE strength to >/= 4+/5 to improve stability with standing and walking.  New   Pt will ambulate >/= 500' on even and uneven surfaces with LRD vs no AD with improved stability and foot clearance S/I. New   Berrios >/= 47/56 to reduce pt's risk for falls. New   5xSTS from chair without UEs </= 12sec to improve pt's functional strength. New   DGI >/= 18/24 to improve pt's safety with ambulation. New          TREATMENT PLAN       Requires PT Follow-Up: Yes    Treatment may include any combination of the following: Strengthening,ROM,Balance training,Functional mobility training,Transfer training,Gait training,Stair training,Neuromuscular re-education,Manual Therapy - Soft Tissue Mobilization,Home exercise program,Safety education & training,Patient/Caregiver education & training,Equipment evaluation, education, & procurement,Modalities,Aquatics     Frequency / Duration:  Patient to be seen 2 times per week for 5 weeks          Eval Complexity:   Decision Making: Medium Complexity  History: Personal Factors and/or Comorbidities Impacting POC: High  History: PMH: OA, HTN, Meniere's  Examination of body system(s) including body structures and functions, activity limitations, and/or participation restrictions: Medium  Exam: Decreased strength and balance impacting mobility and QOL. Berrios = 41/56  Clinical Presentation: Medium  Clinical Presentation: Evolving    POST-PAIN     Pain Rating (0-10 pain scale):   4/10  Location and pain description same as pre-treatment unless indicated. Action: [] NA  [] Call Physician  [] Perform HEP  [x] Meds as prescribed    Evaluation and patient rights have been reviewed and patient agrees with plan of care.   Yes  [x]  No  []   Explain:     Brendan Fall Risk Assessment  Risk Factor Scale  Score   History of Falls [x] Yes  [] No 25  0 25   Secondary Diagnosis [] Yes  [x] No 15  0 0   Ambulatory Aid [] Furniture  [x] Crutches/cane/walker  [] None/bedrest/wheelchair/nurse 30  15  0 15   IV/Heparin Lock [] Yes  [x] No 20  0 0   Gait/Transferring [x] Impaired  [] Weak  [] Normal/bedrest/immobile 20  10  0 20   Mental Status [] Forgets limitations  [x] Oriented to own ability 15  0 0      Total:60     Based on the Assessment score: check the appropriate box.   []  No intervention needed   Low =   Score of 0-24  []  Use standard prevention interventions Moderate =  Score of 24-44   [] Discuss fall prevention strategies   [] Indicate moderate falls risk on eval  [x]  Use high risk prevention interventions High = Score of 45 and higher   [x] Discuss fall prevention strategies   [x] Provide supervision during treatment time      Minutes:  PT Individual Minutes  Time In: 9782  Time Out: 1350  Minutes: 43     Procedure Minutes: 43' eval       Electronically signed by Loc Sheffield PT on 5/20/22 at 2:23 PM EDT

## 2022-05-25 ENCOUNTER — HOSPITAL ENCOUNTER (OUTPATIENT)
Dept: PHYSICAL THERAPY | Age: 77
Setting detail: THERAPIES SERIES
Discharge: HOME | End: 2022-05-25
Payer: MEDICARE

## 2022-05-25 PROCEDURE — 97110 THERAPEUTIC EXERCISES: CPT

## 2022-05-25 PROCEDURE — 97112 NEUROMUSCULAR REEDUCATION: CPT

## 2022-05-25 ASSESSMENT — PAIN DESCRIPTION - LOCATION: LOCATION: KNEE

## 2022-05-25 ASSESSMENT — PAIN DESCRIPTION - ORIENTATION: ORIENTATION: LEFT;RIGHT

## 2022-05-25 ASSESSMENT — PAIN SCALES - GENERAL: PAINLEVEL_OUTOF10: 5

## 2022-05-25 NOTE — PROGRESS NOTES
[facility]  Outpatient Physical Therapy    Treatment Note        Date: 2022  Patient: Yancy Lauren  : 1945   Confirmed: Yes  MRN: [id number]  Referring Provider: Valentín Herbert MD   Secondary Referring Provider (If applicable):     Medical Diagnosis: Low back pain, unspecified [M54.50]  Other abnormalities of gait and mobility [R26.89]    Treatment Diagnosis: Abnormal gait, Impaired balance    Visit Information:  Insurance: Payor: [employer] / Plan: MEDICARE PART A AND B / Product Type: *No Product type* /   PT Visit Information  Total # of Visits to Date: 2  Plan of Care/Certification Expiration Date: 22  No Show: 0  Progress Note Due Date: 22  Canceled Appointment: 0  Progress Note Counter: 2/10    Subjective Information:  Subjective: Pt reports difficulty moving in the early mornings due to taking Neuropathy meds at night \"I'm just slow in the mornings\"  HEP Compliance:  Initiated today. Pain Screening  Patient Currently in Pain: Yes  Pain Level: 5  Pain Location: Knee  Pain Orientation: Left,Right    Treatment:  Exercises:  Exercises  Exercise 1: Static standing: FT/FA, modified tandem 30sec ea, Eyes closed 10 sec x2  Exercise 3: Single stepping over cane F/L 0-1 UE support  Exercise 7: Gait drills: F/L/R, march and , tandem 0-1 x2 laps ea  Exercise 8: 2 way SLR x10 Raul, clams 5'' x10  Exercise 9: Bridges, Quad sets 5'' x10  Exercise 17: Hamstring str at steps 3/30sec Raul  Exercise 18: STS from chair x5 without UE use  Exercise 20: HEP: Quad sets, bridges, SLR, clams       *Indicates exercise, modality, or manual techniques to be initiated when appropriate      Assessment: Body Structures, Functions, Activity Limitations Requiring Skilled Therapeutic Intervention: Decreased functional mobility ,Decreased strength,Decreased endurance,Decreased balance,Increased pain  Assessment: Initiated stretching, strengthening and balance activities.  Patient demonstrating mild quad lag with Lt SLR and hip abduction. VCs to facilitate heel strike with gait drills as well as hip/ankle strategies to improve balance. Treatment Diagnosis: Abnormal gait, Impaired balance  Therapy Prognosis: Good          Post-Pain Assessment:       Pain Rating (0-10 pain scale):   2/10   Location and pain description same as pre-treatment unless indicated. Action: [] NA   [] Perform HEP  [x] Meds as prescribed  [] Modalities as prescribed   [] Call Physician     GOALS   Patient Goal(s): Patient goals : Strengthen legs, decrease back pain    Short Term Goals Completed by 2 weeks Goal Status   STG 1 Independent and compliant with HEP. In progress       Long Term Goals Completed by 5 weeks Goal Status   LTG 1 Improve liyah LE strength to >/= 4+/5 to improve stability with standing and walking. In progress   LTG 2 Pt will ambulate >/= 500' on even and uneven surfaces with LRD vs no AD with improved stability and foot clearance S/I. In progress   LTG 3 Berrios >/= 47/56 to reduce pt's risk for falls. In progress   LTG 4 5xSTS from chair without UEs </= 12sec to improve pt's functional strength. In progress   LTG 5 DGI >/= 18/24 to improve pt's safety with ambulation. In progress     Plan:  Frequency/Duration:  Plan  Plan Frequency: 2  Plan weeks: 5  Current Treatment Recommendations: Strengthening,ROM,Balance training,Functional mobility training,Transfer training,Gait training,Stair training,Neuromuscular re-education,Manual Therapy - Soft Tissue Mobilization,Home exercise program,Safety education & training,Patient/Caregiver education & training,Equipment evaluation, education, & procurement,Modalities,Aquatics  Pt to continue current HEP. See objective section for any therapeutic exercise changes, additions or modifications this date.     Therapy Time:      PT Individual Minutes  Time In: 0802  Time Out: 9627  Minutes: 57  Timed Code Treatment Minutes: 57 Minutes  Procedure Minutes:0  Timed Activity Minutes Units   Ther Ex 27 2   Neuro 30 2     Electronically signed by Annie Sawyer PTA on 5/25/22 at 7:55 AM EDT

## 2022-05-27 ENCOUNTER — HOSPITAL ENCOUNTER (OUTPATIENT)
Dept: PHYSICAL THERAPY | Age: 77
Setting detail: THERAPIES SERIES
Discharge: HOME | End: 2022-05-27
Payer: MEDICARE

## 2022-05-27 PROCEDURE — 97113 AQUATIC THERAPY/EXERCISES: CPT

## 2022-05-27 ASSESSMENT — PAIN SCALES - GENERAL: PAINLEVEL_OUTOF10: 5

## 2022-05-27 ASSESSMENT — PAIN DESCRIPTION - DESCRIPTORS: DESCRIPTORS: ACHING

## 2022-05-27 ASSESSMENT — PAIN DESCRIPTION - ORIENTATION: ORIENTATION: RIGHT;LEFT

## 2022-05-27 ASSESSMENT — PAIN DESCRIPTION - LOCATION: LOCATION: KNEE

## 2022-05-27 NOTE — PROGRESS NOTES
[facility]  Outpatient Physical Therapy    Treatment Note        Date: 2022  Patient: Celia Jimenes  : 1945   Confirmed: Yes  MRN: [id number]  Referring Provider: Merlinda Cancer, MD   Secondary Referring Provider (If applicable):     Medical Diagnosis: Low back pain, unspecified [M54.50]  Other abnormalities of gait and mobility [R26.89]    Treatment Diagnosis: Abnormal gait, Impaired balance    Visit Information:  Insurance: Payor: Allyson Corbett / Plan: MEDICARE PART A AND B / Product Type: *No Product type* /   PT Visit Information  Total # of Visits to Date: 3  Plan of Care/Certification Expiration Date: 22  No Show: 0  Progress Note Due Date: 22  Canceled Appointment: 0  Progress Note Counter: 3/10    Subjective Information:  Subjective: Pt with no new reports. HEP Compliance:  [x] Good [] Fair [] Poor [] Reports not doing due to:    Pain Screening  Patient Currently in Pain: Yes  Pain Assessment: 0-10  Pain Level: 5  Pain Location: Knee  Pain Orientation: Right,Left  Pain Descriptors: Aching    Treatment:  Exercises:  Exercises  Exercise 11: Aquatic:  Exercise 12: Gait drills: F/L/R/marching x3-4 laps  Exercise 13: Sink ex x15 ea  Exercise 14: Hip circles x15 ea  Exercise 15: Step ups F/L x10 ea  Exercise 16: Balance ex: FT x30 sec, tandem 30 sec x 2 ea  Exercise 17: Hamstring str at steps 3/30sec Raul       Assessment: Body Structures, Functions, Activity Limitations Requiring Skilled Therapeutic Intervention: Decreased functional mobility ,Decreased strength,Decreased endurance,Decreased balance,Increased pain  Assessment: Initiated aquatics per PT POC with good tolerance. Pt utilizing HR for balance with most activities. Increased lateral instability noted with marching gait drill. Denies increased pain post session.   Treatment Diagnosis: Abnormal gait, Impaired balance  Therapy Prognosis: Good          Post-Pain Assessment:       Pain Rating (0-10 pain scale):   0/10 Location and pain description same as pre-treatment unless indicated. Action: [] NA   [x] Perform HEP  [] Meds as prescribed  [] Modalities as prescribed   [] Call Physician     GOALS   Patient Goal(s): Patient goals : Strengthen legs, decrease back pain    Short Term Goals Completed by 2 weeks Goal Status   STG 1 Independent and compliant with HEP. In progress       Long Term Goals Completed by 5 weeks Goal Status   LTG 1 Improve liyah LE strength to >/= 4+/5 to improve stability with standing and walking. In progress   LTG 2 Pt will ambulate >/= 500' on even and uneven surfaces with LRD vs no AD with improved stability and foot clearance S/I. In progress   LTG 3 Berrios >/= 47/56 to reduce pt's risk for falls. In progress   LTG 4 5xSTS from chair without UEs </= 12sec to improve pt's functional strength. In progress   LTG 5 DGI >/= 18/24 to improve pt's safety with ambulation. In progress            Plan:  Frequency/Duration:  Plan  Plan Frequency: 2  Plan weeks: 5  Current Treatment Recommendations: Strengthening,ROM,Balance training,Functional mobility training,Transfer training,Gait training,Stair training,Neuromuscular re-education,Manual Therapy - Soft Tissue Mobilization,Home exercise program,Safety education & training,Patient/Caregiver education & training,Equipment evaluation, education, & procurement,Modalities,Aquatics  Pt to continue current HEP. See objective section for any therapeutic exercise changes, additions or modifications this date.     Therapy Time:      PT Individual Minutes  Time In: 4205  Time Out: 1046  Minutes: 39  Timed Code Treatment Minutes: 39 Minutes  Procedure Minutes: 0    Timed Activity Minutes Units   Aquatics 39 3     Electronically signed by Lyubov Shepherd PTA on 5/27/22 at 10:22 AM EDT

## 2022-06-02 ENCOUNTER — HOSPITAL ENCOUNTER (OUTPATIENT)
Dept: PHYSICAL THERAPY | Age: 77
Setting detail: THERAPIES SERIES
Discharge: HOME | End: 2022-06-02
Payer: MEDICARE

## 2022-06-02 PROCEDURE — 97113 AQUATIC THERAPY/EXERCISES: CPT

## 2022-06-02 ASSESSMENT — PAIN DESCRIPTION - ORIENTATION: ORIENTATION: LEFT

## 2022-06-02 ASSESSMENT — PAIN SCALES - GENERAL: PAINLEVEL_OUTOF10: 4

## 2022-06-02 ASSESSMENT — PAIN DESCRIPTION - LOCATION: LOCATION: KNEE

## 2022-06-02 NOTE — PROGRESS NOTES
[facility]  Outpatient Physical Therapy    Treatment Note        Date: 2022  Patient: Ton Yan  : 1945   Confirmed: Yes  MRN: [id number]  Referring Provider: Radha Gunn MD   Secondary Referring Provider (If applicable):     Medical Diagnosis: Low back pain, unspecified [M54.50]  Other abnormalities of gait and mobility [R26.89]    Treatment Diagnosis: Abnormal gait, Impaired balance    Visit Information:  Insurance: Payor: Ariel Hummel / Plan: MEDICARE PART A AND B / Product Type: *No Product type* /   PT Visit Information  Total # of Visits to Date: 4  Plan of Care/Certification Expiration Date: 22  No Show: 0  Progress Note Due Date: 22  Canceled Appointment: 0  Progress Note Counter: 4/10    Subjective Information:  Subjective: Pt reports having difficulty descending stairs and getting out of a chair. HEP Compliance:  [x] Good [] Fair [] Poor [] Reports not doing due to:    Pain Screening  Patient Currently in Pain: Yes  Pain Assessment: 0-10  Pain Level: 4  Pain Location: Knee  Pain Orientation: Left    Treatment:  Exercises:  Exercises  Exercise 11: Aquatic:  Exercise 12: Gait drills: F/L/R/tandem x3 laps  Exercise 13: Sink ex x15 ea  Exercise 14: Hip circles x15 ea  Exercise 15: Step ups F/L x10 ea  Exercise 16: Balance ex: FT x30 sec, tandem 30 sec x 2 ea  Exercise 17: Hamstring str at steps 3/30sec Raul  Exercise 18: KB push/pull x15 and bicycle in deep end x2min  Exercise 20: HEP: Quad sets, bridges, SLR, clams       Assessment: Body Structures, Functions, Activity Limitations Requiring Skilled Therapeutic Intervention: Decreased functional mobility ,Decreased strength,Decreased endurance,Decreased balance,Increased pain  Assessment: Continued aquatic therapy per PT POC. Pt with decreased support at HR throughout being able to complete exercises with 0-1HR for support.   Treatment Diagnosis: Abnormal gait, Impaired balance  Therapy Prognosis: Good     Post-Pain Assessment:       Pain Rating (0-10 pain scale):  5 /10   Location and pain description same as pre-treatment unless indicated. Action: [] NA   [x] Perform HEP  [x] Meds as prescribed  [x] Modalities as prescribed   [] Call Physician     GOALS   Patient Goal(s): Patient goals : Strengthen legs, decrease back pain    Short Term Goals Completed by 2 weeks Goal Status   STG 1 Independent and compliant with HEP. In progress       Long Term Goals Completed by 5 weeks Goal Status   LTG 1 Improve liyah LE strength to >/= 4+/5 to improve stability with standing and walking. In progress   LTG 2 Pt will ambulate >/= 500' on even and uneven surfaces with LRD vs no AD with improved stability and foot clearance S/I. In progress   LTG 3 Berrios >/= 47/56 to reduce pt's risk for falls. In progress   LTG 4 5xSTS from chair without UEs </= 12sec to improve pt's functional strength. In progress   LTG 5 DGI >/= 18/24 to improve pt's safety with ambulation. In progress            Plan:  Frequency/Duration:  Plan  Plan Frequency: 2  Plan weeks: 5  Current Treatment Recommendations: Strengthening,ROM,Balance training,Functional mobility training,Transfer training,Gait training,Stair training,Neuromuscular re-education,Manual Therapy - Soft Tissue Mobilization,Home exercise program,Safety education & training,Patient/Caregiver education & training,Equipment evaluation, education, & procurement,Modalities,Aquatics  Pt to continue current HEP. See objective section for any therapeutic exercise changes, additions or modifications this date.     Therapy Time:      PT Individual Minutes  Time In: 4638  Time Out: 6555  Minutes: 43  Timed Code Treatment Minutes: 43 Minutes  Procedure Minutes:  Timed Activity Minutes Units   Aquatic 43 3     Electronically signed by Jany Bullock PTA on 6/2/22 at 10:38 AM EDT

## 2022-06-07 ENCOUNTER — HOSPITAL ENCOUNTER (OUTPATIENT)
Dept: PHYSICAL THERAPY | Age: 77
Setting detail: THERAPIES SERIES
Discharge: HOME | End: 2022-06-07
Payer: MEDICARE

## 2022-06-07 PROCEDURE — 97113 AQUATIC THERAPY/EXERCISES: CPT

## 2022-06-07 ASSESSMENT — PAIN DESCRIPTION - INTENSITY: RATING_2: 4

## 2022-06-07 ASSESSMENT — PAIN DESCRIPTION - LOCATION
LOCATION: BACK
LOCATION_2: KNEE

## 2022-06-07 ASSESSMENT — PAIN DESCRIPTION - ORIENTATION: ORIENTATION_2: LEFT;RIGHT

## 2022-06-07 ASSESSMENT — PAIN SCALES - GENERAL: PAINLEVEL_OUTOF10: 6

## 2022-06-07 NOTE — PROGRESS NOTES
[facility]  Outpatient Physical Therapy    Treatment Note        Date: 2022  Patient: Katey Session  : 1945   Confirmed: Yes  MRN: [id number]  Referring Provider: Daquan Harris MD   Secondary Referring Provider (If applicable):     Medical Diagnosis: Low back pain, unspecified [M54.50]  Other abnormalities of gait and mobility [R26.89]    Treatment Diagnosis: Abnormal gait, Impaired balance    Visit Information:  Insurance: Payor: Leafy Dellwood / Plan: MEDICARE PART A AND B / Product Type: *No Product type* /   PT Visit Information  Total # of Visits to Date: 5  Plan of Care/Certification Expiration Date: 22  No Show: 0  Progress Note Due Date: 22  Canceled Appointment: 0  Progress Note Counter: 5/10    Subjective Information:  Subjective: Pt reports increase in pain secondary to what she thinks is from a change in the weather. HEP Compliance:  [x] Good [] Fair [] Poor [] Reports not doing due to:    Pain Screening  Patient Currently in Pain: Yes  Pain Assessment: 0-10  Pain Level: 6  Pain Location: Back  Multiple Pain Sites: Yes  Pain 2  Pain Rating 2: 4  Pain Location 2: Knee  Pain Orientation 2: Left,Right    Treatment:  Exercises:  Exercises  Exercise 11: Aquatic:  Exercise 12: Gait drills: F/L/R/tandem x3 laps  Exercise 13: Sink ex x15 ea  Exercise 14: Hip circles x15 ea  Exercise 15: Step ups F/L x15 ea  Exercise 17: Hamstring str at steps 3/30sec Raul  Exercise 18: bicycle in deep end x3min  Exercise 20: HEP: Quad sets, bridges, SLR, clams        Assessment: Body Structures, Functions, Activity Limitations Requiring Skilled Therapeutic Intervention: Decreased functional mobility ,Decreased strength,Decreased endurance,Decreased balance,Increased pain,Decreased high-level IADLs  Assessment: Pt continues to progress reps and UE assistance during exercises to decreased pain and improve strength and balance.   Treatment Diagnosis: Abnormal gait, Impaired balance  Therapy Prognosis: Good          Post-Pain Assessment:       Pain Rating (0-10 pain scale):   4/10   Location and pain description same as pre-treatment unless indicated. Action: [] NA   [] Perform HEP  [x] Meds as prescribed  [x] Modalities as prescribed   [] Call Physician     GOALS   Patient Goal(s): Patient goals : Strengthen legs, decrease back pain    Short Term Goals Completed by 2 weeks Goal Status   STG 1 Independent and compliant with HEP. In progress       Long Term Goals Completed by 5 weeks Goal Status   LTG 1 Improve liyah LE strength to >/= 4+/5 to improve stability with standing and walking. In progress   LTG 2 Pt will ambulate >/= 500' on even and uneven surfaces with LRD vs no AD with improved stability and foot clearance S/I. In progress   LTG 3 Berrios >/= 47/56 to reduce pt's risk for falls. In progress   LTG 4 5xSTS from chair without UEs </= 12sec to improve pt's functional strength. In progress   LTG 5 DGI >/= 18/24 to improve pt's safety with ambulation. In progress            Plan:  Frequency/Duration:  Plan  Plan Frequency: 2  Plan weeks: 5  Current Treatment Recommendations: Strengthening,ROM,Balance training,Functional mobility training,Transfer training,Gait training,Stair training,Neuromuscular re-education,Manual Therapy - Soft Tissue Mobilization,Home exercise program,Safety education & training,Patient/Caregiver education & training,Equipment evaluation, education, & procurement,Modalities,Aquatics  Pt to continue current HEP. See objective section for any therapeutic exercise changes, additions or modifications this date.     Therapy Time:      PT Individual Minutes  Time In: 7658  Time Out: 6548  Minutes: 40  Timed Code Treatment Minutes: 40 Minutes  Procedure Minutes:0  Timed Activity Minutes Units   aquatic 40 3     Electronically signed by Edgardo Hernandez PTA on 6/7/22 at 11:47 AM EDT

## 2022-06-10 ENCOUNTER — HOSPITAL ENCOUNTER (OUTPATIENT)
Dept: PHYSICAL THERAPY | Age: 77
Setting detail: THERAPIES SERIES
Discharge: HOME | End: 2022-06-10
Payer: MEDICARE

## 2022-06-10 NOTE — PROGRESS NOTES
[address]       Physical Therapy  Cancellation/No-show Note  Patient Name:  Butch Cooper  :  1945   Date:  6/10/2022          Visit Information:  PT Visit Information  Total # of Visits to Date: 5  Plan of Care/Certification Expiration Date: 22  No Show: 0  Progress Note Due Date: 22  Canceled Appointment: 1  Progress Note Counter: 5/10 Cx 6/10/22    For today's appointment patient:  [x]  Cancelled  []  Rescheduled appointment  []  No-show   []  Called pt to remind of next appointment     Reason given by patient:  [x]  Patient ill  []  Conflicting appointment  []  No transportation    []  Conflict with work  []  No reason given  []  Other:       Comments:       Signature: Electronically signed by Abdi Poole PTA on 6/10/22 at 10:14 AM EDT

## 2022-06-15 ENCOUNTER — HOSPITAL ENCOUNTER (OUTPATIENT)
Dept: PHYSICAL THERAPY | Age: 77
Setting detail: THERAPIES SERIES
Discharge: HOME | End: 2022-06-15
Payer: MEDICARE

## 2022-06-15 PROCEDURE — 97113 AQUATIC THERAPY/EXERCISES: CPT

## 2022-06-15 ASSESSMENT — PAIN SCALES - GENERAL: PAINLEVEL_OUTOF10: 4

## 2022-06-15 ASSESSMENT — PAIN DESCRIPTION - ORIENTATION: ORIENTATION: LEFT;LOWER

## 2022-06-15 ASSESSMENT — PAIN DESCRIPTION - LOCATION: LOCATION: BACK;KNEE

## 2022-06-15 ASSESSMENT — PAIN DESCRIPTION - DESCRIPTORS: DESCRIPTORS: SORE

## 2022-06-15 NOTE — PROGRESS NOTES
[facility]  Outpatient Physical Therapy    Treatment Note        Date: 6/15/2022  Patient: Evon Sevilla  : 1945   Confirmed: Yes  MRN: [id number]  Referring Provider: Zaira Au MD   Secondary Referring Provider (If applicable):     Medical Diagnosis: Low back pain, unspecified [M54.50]  Other abnormalities of gait and mobility [R26.89]         Visit Information:  Insurance: Payor: Charlene De Los Santos / Plan: MEDICARE PART A AND B / Product Type: *No Product type* /   PT Visit Information  Total # of Visits to Date: 6  Plan of Care/Certification Expiration Date: 22  No Show: 0  Progress Note Due Date: 22  Canceled Appointment: 1  Progress Note Counter: 6/10    Subjective Information:  Subjective: Patient reports resistance in pool is helping with strength. Feels PT is helping though continues to feel off balance in AM. States she sees her Neurologist Friday, may possibly be changing medicine. Scheduled for Lt TKA . HEP Compliance:  [x] Good [] Fair [] Poor [] Reports not doing due to:    Pain Screening  Patient Currently in Pain: Yes  Pain Level: 4  Pain Location: Back,Knee  Pain Orientation: Left,Lower  Pain Descriptors: Sore    Treatment:  Exercises:  Exercises  Exercise 11: Aquatic:  Exercise 12: Gait drills: F/L/R/tandem/march and holds x3 laps  Exercise 13: Sink ex x15 ea (Decreased UE use as able)  Exercise 14: Hip circles x15 ea with UE support  Exercise 15: Step ups F/L x15 ea  Exercise 17: Hamstring str at steps 3/30sec Raul  Exercise 18: Deep end: noodle hang, bicycle, hip abd 3' ea         *Indicates exercise, modality, or manual techniques to be initiated when appropriate        Assessment:    Body Structures, Functions, Activity Limitations Requiring Skilled Therapeutic Intervention: Decreased functional mobility ,Decreased strength,Decreased endurance,Decreased balance,Increased pain,Decreased high-level IADLs  Assessment: Trialed Sink ex's without UE use to challenge stability and balance. Patient utilizes UE movements in the water to stabilize but able to complete without. Patient reporting decrease pain at end of session. Therapy Prognosis: Good          Post-Pain Assessment:       Pain Rating (0-10 pain scale):   2/10   Location and pain description same as pre-treatment unless indicated. Action: [] NA   [] Perform HEP  [] Meds as prescribed  [x] Modalities as prescribed   [] Call Physician     GOALS   Patient Goal(s): Patient goals : Strengthen legs, decrease back pain    Short Term Goals Completed by 2 weeks Goal Status   STG 1 Independent and compliant with HEP. In progress       Long Term Goals Completed by 5 weeks Goal Status   LTG 1 Improve liyah LE strength to >/= 4+/5 to improve stability with standing and walking. In progress   LTG 2 Pt will ambulate >/= 500' on even and uneven surfaces with LRD vs no AD with improved stability and foot clearance S/I. In progress   LTG 3 Berrios >/= 47/56 to reduce pt's risk for falls. In progress   LTG 4 5xSTS from chair without UEs </= 12sec to improve pt's functional strength. In progress   LTG 5 DGI >/= 18/24 to improve pt's safety with ambulation. In progress     Plan:  Frequency/Duration:  Plan  Plan Frequency: 2  Plan weeks: 5  Specific Instructions for Next Treatment: POC NV to continue if appropriate  Current Treatment Recommendations: Strengthening,ROM,Balance training,Functional mobility training,Transfer training,Gait training,Stair training,Neuromuscular re-education,Manual Therapy - Soft Tissue Mobilization,Home exercise program,Safety education & training,Patient/Caregiver education & training,Equipment evaluation, education, & procurement,Modalities,Aquatics  Pt to continue current HEP. See objective section for any therapeutic exercise changes, additions or modifications this date.     Therapy Time:      PT Individual Minutes  Time In: 1000  Time Out: 4926  Minutes: 45  Timed Code Treatment Minutes: 45 Minutes  Procedure Minutes:0  Timed Activity Minutes Units   Aquatic 45 3     Electronically signed by Sanjuana Kathleen PTA on 6/15/22 at 7:55 AM EDT

## 2022-06-17 ENCOUNTER — HOSPITAL ENCOUNTER (OUTPATIENT)
Dept: PHYSICAL THERAPY | Age: 77
Setting detail: THERAPIES SERIES
Discharge: HOME | End: 2022-06-17
Payer: MEDICARE

## 2022-06-17 PROCEDURE — 97112 NEUROMUSCULAR REEDUCATION: CPT

## 2022-06-17 PROCEDURE — 97110 THERAPEUTIC EXERCISES: CPT

## 2022-06-17 PROCEDURE — 97116 GAIT TRAINING THERAPY: CPT

## 2022-06-17 ASSESSMENT — PAIN SCALES - GENERAL: PAINLEVEL_OUTOF10: 0

## 2022-06-17 NOTE — PROGRESS NOTES
[address]     Ph: [phone]  Fax: [phone]      [] Certification  [] Recertification []  Plan of Care  [x] Progress Note [] Discharge      Referring Provider: Sallie Garcia MD      From:  Jose Cardona, PT, DPT  Patient: Al Solis (66 y.o. female) : 1945 Date: 2022   Medical Diagnosis: Low back pain, unspecified [M54.50]  Other abnormalities of gait and mobility [R26.89]    Treatment Diagnosis: Abnormal gait, Impaired balance    Plan of Care/Certification Expiration Date: : 22   Progress Report Period from: 2022  to 2022    Visits to Date: 7 No Show: 0 Cancelled Appts: 1    OBJECTIVE:   Short Term Goals - Time Frame for Short term goals: 2 weeks    Goals Current/Discharge status  Status   Short term goal 1: Independent and compliant with HEP. Pt reports compliance with HEP. In progress     Long Term Goals - Time Frame for Long term goals : 5 weeks  Goals Current/ Discharge status Status   Long term goal 1: Improve liyah LE strength to >/= 4+/5 to improve stability with standing and walking. Strength LLE  Strength LLE: WFL  Comment: Except hip abd 4-/5  Strength RLE  Strength RLE: WFL  Comment: Except hip flex 4+/5, hip abd 4/5    In progress,Not Met   Long term goal 2: Pt will ambulate >/= 500' on even and uneven surfaces with LRD vs no AD with improved stability and foot clearance S/I. Ambulation  Surface: carpet  Device: No Device  Assistance: Independent  Quality of Gait: Decreased step lengh and height b/l, decreased heel strike, increased lateral sway, step length shortened with fatigue  Distance: 350 ft In progress,Not Met   Long term goal 3: Berrios >/= 47/56 to reduce pt's risk for falls. Berrios Balance Score: 46   In progress,Not Met   Long term goal 4: 5xSTS from chair without UEs </= 12sec to improve pt's functional strength.  From chair without UE support 12.11 sec Met   Long term goal 5: DGI >/= 18/24 to improve pt's safety with ambulation. 14/24 In progress,Not Met       Body Structures, Functions, Activity Limitations Requiring Skilled Therapeutic Intervention: Decreased functional mobility ,Decreased strength,Decreased endurance,Decreased balance,Increased pain,Decreased high-level IADLs  Assessment: Pt with a slight decline in liyah LE strength possibly due to fatigue from increased activity the day before. Pt meeting FTSTS goal this date. Improve score on Berrios balance test noted. Baseline score of 14/24 obtained on DGI, indicating increased risk for falls. Pt would benefit from continued therapy to address deficits for improved tolerance to functional activities. Therapy Prognosis: Good      PLAN: [] Evaluate and Treat  Frequency/Duration:  Plan Frequency: 2  Plan weeks: 5  Current Treatment Recommendations: Strengthening,ROM,Balance training,Functional mobility training,Transfer training,Gait training,Stair training,Neuromuscular re-education,Manual Therapy - Soft Tissue Mobilization,Home exercise program,Safety education & training,Patient/Caregiver education & training,Equipment evaluation, education, & procurement,Modalities,Aquatics                     Patient Status:[x] Continue/ Initiate plan of Care    [] Discharge PT. Recommend pt continue with HEP. [] Additional visits requested, Please re-certify for additional visits:    [] Hold         Signature: Objective information by: Electronically signed by Mehrdad Roberts PTA on 6/17/22 at 10:17 AM EDT  Electronically signed by Yehuda Donaldson PT on 6/24/2022 at 2:52 PM    If you have any questions or concerns, please don't hesitate to call. Thank you for your referral.    I have reviewed this plan of care and certify a need for medically necessary rehabilitation services.     Physician Signature:__________________________________________________________  Date:  Please sign and return

## 2022-06-17 NOTE — PROGRESS NOTES
[facility]  Outpatient Physical Therapy    Treatment Note        Date: 2022  Patient: Carol Grossman  : 1945   Confirmed: Yes  MRN: [id number]  Referring Provider: Wu Valero MD   Secondary Referring Provider (If applicable):     Medical Diagnosis: Low back pain, unspecified [M54.50]  Other abnormalities of gait and mobility [R26.89]    Treatment Diagnosis: Abnormal gait, Impaired balance    Visit Information:  Insurance: Payor: Mariaa Martinez / Plan: MEDICARE PART A AND B / Product Type: *No Product type* /   PT Visit Information  Total # of Visits to Date: 7  Plan of Care/Certification Expiration Date: 22  No Show: 0  Progress Note Due Date: 22  Canceled Appointment: 1  Progress Note Counter: 7/10    Subjective Information:  Subjective: Pt reports increased LE soreness this date d/t increased standing and walking yesterday. Sees Dr. Joe Cade this afternoon. Reports feels neuropathy in RLE has worsened in the last month d/t medication. Concerned about how neuropathy will impact recovery from Lt TKA next month.   HEP Compliance:  [x] Good [] Fair [] Poor [] Reports not doing due to:    Pain Screening  Patient Currently in Pain: Denies  Pain Assessment: 0-10  Pain Level: 0    Treatment:  Exercises:  Exercises  Exercise 1: FTSTS From chair without UE support 12.11 sec  Exercise 2: Foam: FA, head turns, trunk rotation, b/l shoulder flxn, FT, tandem  Exercise 4: Berrios 46/56, DGI 14/24  Exercise 5: Seated HS str 30 sec x 3 b/l  Exercise 13: Sink ex x15 ea (Decreased UE use as able)  Exercise 20: HEP: sink ex     *Indicates exercise, modality, or manual techniques to be initiated when appropriate    Objective Measures:   Ambulation  Surface: carpet  Device: No Device  Assistance: Independent  Quality of Gait: Decreased step lengh and height b/l, decreased heel strike, increased lateral sway, step length shortened with fatigue  Distance: 350 ft    Strength: [] NT  [x] MMT completed:  Strength RLE  Strength RLE: WFL  Comment: Except hip flex 4+/5, hip abd 4/5  Strength LLE  Strength LLE: WFL  Comment: Except hip abd 4-/5    Assessment: Body Structures, Functions, Activity Limitations Requiring Skilled Therapeutic Intervention: Decreased functional mobility ,Decreased strength,Decreased endurance,Decreased balance,Increased pain,Decreased high-level IADLs  Assessment: Pt with a slight decline in liyah LE strength possibly due to fatigue from increased activity the day before. Pt meeting FTSTS goal this date. Improve score on Berrios balance test noted. Baseline score of 14/24 obtained on DGI, indicating increased risk for falls. Pt would benefit from continued therapy to address deficits for improved tolerance to functional activities. Treatment Diagnosis: Abnormal gait, Impaired balance  Therapy Prognosis: Good          Post-Pain Assessment:       Pain Rating (0-10 pain scale):   0/10   Location and pain description same as pre-treatment unless indicated. Action: [] NA   [x] Perform HEP  [] Meds as prescribed  [] Modalities as prescribed   [] Call Physician     GOALS   Patient Goal(s): Patient goals : Strengthen legs, decrease back pain    Short Term Goals Completed by 2 weeks Goal Status   STG 1 Independent and compliant with HEP. In progress       Long Term Goals Completed by 5 weeks Goal Status   LTG 1 Improve liyah LE strength to >/= 4+/5 to improve stability with standing and walking. In progress,Not Met   LTG 2 Pt will ambulate >/= 500' on even and uneven surfaces with LRD vs no AD with improved stability and foot clearance S/I. In progress,Not Met   LTG 3 Berrios >/= 47/56 to reduce pt's risk for falls. In progress,Not Met   LTG 4 5xSTS from chair without UEs </= 12sec to improve pt's functional strength. Met   LTG 5 DGI >/= 18/24 to improve pt's safety with ambulation.  In progress,Not Met            Plan:  Frequency/Duration:  Plan  Plan Frequency: 2  Plan weeks: 5  Current Treatment Recommendations: Strengthening,ROM,Balance training,Functional mobility training,Transfer training,Gait training,Stair training,Neuromuscular re-education,Manual Therapy - Soft Tissue Mobilization,Home exercise program,Safety education & training,Patient/Caregiver education & training,Equipment evaluation, education, & procurement,Modalities,Aquatics  Pt to continue current HEP. See objective section for any therapeutic exercise changes, additions or modifications this date.     Therapy Time:      PT Individual Minutes  Time In: 5997  Time Out: 1056  Minutes: 54  Timed Code Treatment Minutes: 54 Minutes  Procedure Minutes: 0    Timed Activity Minutes Units   Ther Ex 15 1   Gait 10 1   Neuro  29 2     Electronically signed by Rafael Mullen PTA on 6/17/22 at 10:05 AM EDT

## 2022-06-22 ENCOUNTER — HOSPITAL ENCOUNTER (OUTPATIENT)
Dept: PHYSICAL THERAPY | Age: 77
Setting detail: THERAPIES SERIES
Discharge: HOME | End: 2022-06-22
Payer: MEDICARE

## 2022-06-22 PROCEDURE — 97113 AQUATIC THERAPY/EXERCISES: CPT

## 2022-06-22 ASSESSMENT — PAIN DESCRIPTION - DESCRIPTORS: DESCRIPTORS: SORE

## 2022-06-22 ASSESSMENT — PAIN SCALES - GENERAL: PAINLEVEL_OUTOF10: 3

## 2022-06-22 ASSESSMENT — PAIN DESCRIPTION - LOCATION: LOCATION: BACK

## 2022-06-22 NOTE — PROGRESS NOTES
[facility]  Outpatient Physical Therapy    Treatment Note        Date: 2022  Patient: Ton Yan  : 1945   Confirmed: Yes  MRN: [id number]  Referring Provider: Radha Gunn MD   Secondary Referring Provider (If applicable):     Medical Diagnosis: Low back pain, unspecified [M54.50]  Other abnormalities of gait and mobility [R26.89]    Treatment Diagnosis: Abnormal gait, Impaired balance    Visit Information:  Insurance: Payor: Ariel Estepahnie / Plan: MEDICARE PART A AND B / Product Type: *No Product type* /   PT Visit Information  Total # of Visits to Date: 8  Plan of Care/Certification Expiration Date: 22  No Show: 0  Progress Note Due Date: 22  Canceled Appointment: 1  Progress Note Counter: 8/10    Subjective Information:  Subjective: Patient reports having Gabapentin adjusted through Neurologist \"I had to cut back a little because it was making me wobbly\"  HEP Compliance:  [] Good [x] Fair [] Poor [] Reports not doing due to:    Pain Screening  Patient Currently in Pain: Yes  Pain Level: 3  Pain Location: Back  Pain Descriptors: Sore    Treatment:  Exercises:  Exercises  Exercise 11: Aquatic:  Exercise 12: Gait drills: F/L/R/tandem/march and holds x3 laps;DGI tasks, eyes closed  Exercise 13: Sink ex x15 ea (Decreased UE use as able)  Exercise 14: Hip circles x15 ea with UE support  Exercise 15: Step ups F/L x15 ea  Exercise 17: Hamstring str at steps 3/30sec Raul  Exercise 18: Deep end: noodle hang, bicycle, hip abd 3' ea       *Indicates exercise, modality, or manual techniques to be initiated when appropriate        Assessment: Body Structures, Functions, Activity Limitations Requiring Skilled Therapeutic Intervention: Decreased functional mobility ,Decreased strength,Decreased endurance,Decreased balance,Increased pain,Decreased high-level IADLs  Assessment: Continued alternating land to pool to improve strength, balance and mobility.  Cues to decrease UE compensations with balance exercises. Progressed gait drills with DGI tasks to improve dynamic balance, patient challenged ambulating with eyes closed. Treatment Diagnosis: Abnormal gait, Impaired balance  Therapy Prognosis: Good          Post-Pain Assessment:       Pain Rating (0-10 pain scale):  4 /10   Location and pain description same as pre-treatment unless indicated. Action: [] NA   [] Perform HEP  [x] Meds as prescribed  [] Modalities as prescribed   [] Call Physician     GOALS   Patient Goal(s): Patient goals : Strengthen legs, decrease back pain    Short Term Goals Completed by 2 weeks Goal Status   STG 1 Independent and compliant with HEP. In progress     Long Term Goals Completed by 5 weeks Goal Status   LTG 1 Improve liyah LE strength to >/= 4+/5 to improve stability with standing and walking. In progress,Not Met   LTG 2 Pt will ambulate >/= 500' on even and uneven surfaces with LRD vs no AD with improved stability and foot clearance S/I. In progress,Not Met   LTG 3 Berrios >/= 47/56 to reduce pt's risk for falls. In progress,Not Met   LTG 4 5xSTS from chair without UEs </= 12sec to improve pt's functional strength. Met   LTG 5 DGI >/= 18/24 to improve pt's safety with ambulation. In progress,Not Met     Plan:  Frequency/Duration:  Plan  Plan Frequency: 2  Plan weeks: 5  Current Treatment Recommendations: Strengthening,ROM,Balance training,Functional mobility training,Transfer training,Gait training,Stair training,Neuromuscular re-education,Manual Therapy - Soft Tissue Mobilization,Home exercise program,Safety education & training,Patient/Caregiver education & training,Equipment evaluation, education, & procurement,Modalities,Aquatics  Pt to continue current HEP. See objective section for any therapeutic exercise changes, additions or modifications this date.     Therapy Time:      PT Individual Minutes  Time In: 7059  Time Out: [address]  Minutes: 47  Timed Code Treatment Minutes: 47 Minutes  Procedure Minutes:0  Timed Activity Minutes Units   Aquatic 47 3     Electronically signed by Justino Adhikari PTA on 6/22/22 at 3:23 PM EDT

## 2022-06-24 ENCOUNTER — HOSPITAL ENCOUNTER (OUTPATIENT)
Dept: PHYSICAL THERAPY | Age: 77
Setting detail: THERAPIES SERIES
Discharge: HOME | End: 2022-06-24
Payer: MEDICARE

## 2022-06-24 PROCEDURE — 97113 AQUATIC THERAPY/EXERCISES: CPT

## 2022-06-24 ASSESSMENT — PAIN DESCRIPTION - ORIENTATION: ORIENTATION: LOWER

## 2022-06-24 ASSESSMENT — PAIN DESCRIPTION - DESCRIPTORS: DESCRIPTORS: SORE

## 2022-06-24 ASSESSMENT — PAIN DESCRIPTION - LOCATION: LOCATION: BACK

## 2022-06-24 ASSESSMENT — PAIN SCALES - GENERAL: PAINLEVEL_OUTOF10: 3

## 2022-06-24 NOTE — PROGRESS NOTES
[facility]  Outpatient Physical Therapy    Treatment Note        Date: 2022  Patient: Opal Andrade  : 1945   Confirmed: Yes  MRN: [id number]  Referring Provider: Bro Stewart MD   Secondary Referring Provider (If applicable):     Medical Diagnosis: Low back pain, unspecified [M54.50]  Other abnormalities of gait and mobility [R26.89]    Treatment Diagnosis: Abnormal gait, Impaired balance    Visit Information:  Insurance: Payor: Trice Almaguer / Plan: MEDICARE PART A AND B / Product Type: *No Product type* /   PT Visit Information  Total # of Visits to Date: 5  Plan of Care/Certification Expiration Date: 22  No Show: 0  Progress Note Due Date: 22  Canceled Appointment: 1  Progress Note Counter: 9/10    Subjective Information:  Subjective: Pt reports increased LE soreness last night. HEP Compliance:  [x] Good [] Fair [] Poor [] Reports not doing due to:    Pain Screening  Patient Currently in Pain: Yes  Pain Assessment: 0-10  Pain Level: 3  Pain Location: Back  Pain Orientation: Lower  Pain Descriptors: Sore    Treatment:  Exercises:  Exercises  Exercise 11: Aquatic:  Exercise 12: Gait drills: F/L/R/tandem/march and holds x3 laps  Exercise 13: Sink ex x20 ea (Decreased UE use as able)  Exercise 14: Hip circles x20 ea with UE support  Exercise 15: Step ups F/L x15 ea  Exercise 17: Hamstring str at steps 3/30sec Raul  Exercise 18: Deep end: noodle hang, bicycle, hip abd 3' ea     *Indicates exercise, modality, or manual techniques to be initiated when appropriate    Assessment: Body Structures, Functions, Activity Limitations Requiring Skilled Therapeutic Intervention: Decreased functional mobility ,Decreased strength,Decreased endurance,Decreased balance,Increased pain,Decreased high-level IADLs  Assessment: Continued aquatics per PT POC with good tolerance. Progressed reps with sink ex without report of increased pain.  Pt demonstrates decreased reliance on UE support with aquatics. Treatment Diagnosis: Abnormal gait, Impaired balance  Therapy Prognosis: Good          Post-Pain Assessment:       Pain Rating (0-10 pain scale):   3/10   Location and pain description same as pre-treatment unless indicated. Action: [] NA   [x] Perform HEP  [] Meds as prescribed  [] Modalities as prescribed   [] Call Physician     GOALS   Patient Goal(s): Patient goals : Strengthen legs, decrease back pain    Short Term Goals Completed by 2 weeks Goal Status   STG 1 Independent and compliant with HEP. In progress       Long Term Goals Completed by 5 weeks Goal Status   LTG 1 Improve liyah LE strength to >/= 4+/5 to improve stability with standing and walking. In progress,Not Met   LTG 2 Pt will ambulate >/= 500' on even and uneven surfaces with LRD vs no AD with improved stability and foot clearance S/I. In progress,Not Met   LTG 3 Berrios >/= 47/56 to reduce pt's risk for falls. In progress,Not Met   LTG 4 5xSTS from chair without UEs </= 12sec to improve pt's functional strength. Met   LTG 5 DGI >/= 18/24 to improve pt's safety with ambulation. In progress,Not Met            Plan:  Frequency/Duration:  Plan  Plan Frequency: 2  Plan weeks: 5  Specific Instructions for Next Treatment: POC vs DC  Current Treatment Recommendations: Strengthening,ROM,Balance training,Functional mobility training,Transfer training,Gait training,Stair training,Neuromuscular re-education,Manual Therapy - Soft Tissue Mobilization,Home exercise program,Safety education & training,Patient/Caregiver education & training,Equipment evaluation, education, & procurement,Modalities,Aquatics  Pt to continue current HEP. See objective section for any therapeutic exercise changes, additions or modifications this date.     Therapy Time:      PT Individual Minutes  Time In: 2382  Time Out: 0720  Minutes: 42  Timed Code Treatment Minutes: 42 Minutes  Procedure Minutes: 0    Timed Activity Minutes Units   Aquatics 42 3     Electronically signed by Donya Seay, PTA on 6/24/22 at 1:53 PM EDT

## 2022-06-28 ENCOUNTER — HOSPITAL ENCOUNTER (OUTPATIENT)
Dept: PHYSICAL THERAPY | Age: 77
Setting detail: THERAPIES SERIES
Discharge: HOME | End: 2022-06-28
Payer: MEDICARE

## 2022-06-28 PROCEDURE — 97112 NEUROMUSCULAR REEDUCATION: CPT

## 2022-06-28 PROCEDURE — 97116 GAIT TRAINING THERAPY: CPT

## 2022-06-28 ASSESSMENT — PAIN SCALES - GENERAL: PAINLEVEL_OUTOF10: 4

## 2022-06-28 ASSESSMENT — PAIN DESCRIPTION - LOCATION: LOCATION: KNEE;BACK

## 2022-06-28 NOTE — PROGRESS NOTES
[address]     Ph: [phone]  Fax: [phone]      [] Certification  [] Recertification []  Plan of Care  [] Progress Note [x] Discharge      Referring Provider: Toño Morse MD       From:  Geraldine Blanco, PT   Patient: Cory Pollard (34 y.o. female) : 1945 Date: 2022   Medical Diagnosis: Low back pain, unspecified [M54.50]  Other abnormalities of gait and mobility [R26.89]    Treatment Diagnosis: Abnormal gait, Impaired balance    Plan of Care/Certification Expiration Date: : 22   Progress Report Period from:  2022  to 2022    Visits to Date: 10 No Show: 0 Cancelled Appts: 1    OBJECTIVE:   Short Term Goals - Time Frame for Short term goals: 2 weeks    Goals Current/Discharge status  Status   Short term goal 1: Independent and compliant with HEP. Pt demonstrated indep with HEP. Met     Long Term Goals - Time Frame for Long term goals : 5 weeks  Goals Current/ Discharge status Status   Long term goal 1: Improve liyah LE strength to >/= 4+/5 to improve stability with standing and walking. Strength LLE  L Hip Flexion: 5/5  L Hip ABduction: 4+/5  L Knee Flexion: 5/5  L Knee Extension: 5/5  L Ankle Dorsiflexion: 5/5  L Ankle Plantar Flexion: 5/5  Strength RLE  Strength RLE: WFL  R Hip Flexion: 5/5  R Hip ABduction: 4/5  R Knee Flexion: 5/5  R Knee Extension: 5/5  R Ankle Dorsiflexion: 5/5  R Ankle Plantar flexion: 5/5  Partially met   Long term goal 2: Pt will ambulate >/= 500' on even and uneven surfaces with LRD vs no AD with improved stability and foot clearance S/I. Ambulation  Surface: outdoors,ramp,uneven  Device: No Device,Single point cane  Assistance: Independent  Quality of Gait: pt. demonstrated good trunk control and even step length.  pt had a slow therese and was fatigued needing seated rest break, did have single mild episode of LOB but was able to correct independently. Distance: >/=500'  Comments: discussed and educated patient regarding proper use of SPC, specifically correct hand to hold cane in as well as how to properly fit cane to person. Patient demonstrated good understanding and increased quality of gait with these specific changes. Met   Long term goal 3: Berrios >/= 47/56 to reduce pt's risk for falls. Berrios Balance Score: 51   Met   Long term goal 4: 5xSTS from chair without UEs </= 12sec to improve pt's functional strength. 5 Times Sit to Stand  5 TIMES SIT TO STAND: 12 seconds   Met   Long term goal 5: DGI >/= 18/24 to improve pt's safety with ambulation. Dynamic Gait Total Score: 20   Met       Body Structures, Functions, Activity Limitations Requiring Skilled Therapeutic Intervention: Decreased functional mobility ,Decreased strength,Decreased endurance,Decreased balance,Increased pain,Decreased high-level IADLs  Assessment: Patient was reassesed for D/C during today's session. She presented with increased balance as demonstrated by improvement of scores in regards to 5xSTS, DGI, and Berrios. Patient demonstrated mild strength improvements however WFL for her everyday activities. Patient was able to ambulate with and without SPC demonstrating mild to no deficits. She had one episode of mild LOB when ambulating outside on uneven ground, however was able to correct without cues. Patient has met all of her goals, except partially meeting her strength goal. Discussed with patient about HEP and patient stated understanding. Pt agrees to D/C and continue HEP indep at home. Therapy Prognosis: Good           PLAN: [] Evaluate and Treat  Frequency/Duration:  Plan Comment: D/C HEP         Patient Status:[] Continue/ Initiate plan of Care    [x] Discharge PT. Recommend pt continue with HEP.      [] Additional visits requested, Please re-certify for additional visits:    [] Hold         Signature: Electronically signed by Yolanda Campos, PT on 6/28/22 at 2:56 PM EDT      If you have any questions or concerns, please don't hesitate to call. Thank you for your referral.    I have reviewed this plan of care and certify a need for medically necessary rehabilitation services.     Physician Signature:__________________________________________________________  Date:  Please sign and return

## 2022-06-28 NOTE — PROGRESS NOTES
[facility]  Outpatient Physical Therapy    Treatment Note        Date: 2022  Patient: Ras Tidwell  : 1945   Confirmed: Yes  MRN: [id number]  Referring Provider: Wilmer Garcia MD   Secondary Referring Provider (If applicable):     Medical Diagnosis: Low back pain, unspecified [M54.50]  Other abnormalities of gait and mobility [R26.89]    Treatment Diagnosis: Abnormal gait, Impaired balance    Visit Information:  Insurance: Payor: Neo Grossman / Plan: MEDICARE PART A AND B / Product Type: *No Product type* /   PT Visit Information  Total # of Visits to Date: 10  Plan of Care/Certification Expiration Date: 22  No Show: 0  Progress Note Due Date: 22  Canceled Appointment: 1  Progress Note Counter: 10/10     Subjective Information:  Subjective: Patient reports she is doing alright, some pain in her knees  HEP Compliance:  [x] Good [] Fair [] Poor [] Reports not doing due to:    Pain Screening  Patient Currently in Pain: Yes  Pain Level: 4 (R and back=4 ; L= 6)  Pain Location: Knee,Back        Objective Measures:      Berrios Balance Score: 51     Dynamic Gait Total Score: 20    5 Times Sit to Stand  5 TIMES SIT TO STAND: 12 seconds    Ambulation  Surface: outdoors,ramp,uneven  Device: No Device,Single point cane  Assistance: Independent  Quality of Gait: pt. demonstrated good trunk control and even step length. pt had a slow therese and was fatigued needing seated rest break, did have single mild episode of LOB but was able to correct independently. Distance: >/=500'  Comments: discussed and educated patient regarding proper use of SPC, specifically correct hand to hold cane in as well as how to properly fit cane to person. Patient demonstrated good understanding and increased quality of gait with these specific changes.     Strength: [] NT  [x] MMT completed:  Strength RLE  Strength RLE: WFL  R Hip Flexion: 5/5  R Hip ABduction: 4/5  R Knee Flexion: 5/5  R Knee Extension: 5/5  R Ankle Dorsiflexion: 5/5  R Ankle Plantar flexion: 5/5  Strength LLE  L Hip Flexion: 5/5  L Hip ABduction: 4+/5  L Knee Flexion: 5/5  L Knee Extension: 5/5  L Ankle Dorsiflexion: 5/5  L Ankle Plantar Flexion: 5/5          ROM: [x] NT  [] ROM measurements:     Assessment: Body Structures, Functions, Activity Limitations Requiring Skilled Therapeutic Intervention: Decreased functional mobility ,Decreased strength,Decreased endurance,Decreased balance,Increased pain,Decreased high-level IADLs  Assessment: Patient was reassesed for D/C during today's session. She presented with increased balance as demonstrated by improvement of scores in regards to 5xSTS, DGI, and Berrios. Patient demonstrated mild strength improvements however WFL for her everyday activities. Patient was able to ambulate with and without SPC demonstrating mild to no deficits. She had one episode of mild LOB when ambulating outside on uneven ground, however was able to correct without cues. Patient has met all of her goals, except partially meeting her strength goal. Discussed with patient about HEP and patient stated understanding. Pt agrees to D/C and continue HEP indep at home. Treatment Diagnosis: Abnormal gait, Impaired balance  Therapy Prognosis: Good     Activity Tolerance  Activity Tolerance: Patient tolerated treatment well    Post-Pain Assessment:       Pain Rating (0-10 pain scale):   0/10   Location and pain description same as pre-treatment unless indicated. Action: [] NA   [x] Perform HEP  [] Meds as prescribed  [] Modalities as prescribed   [] Call Physician     GOALS   Patient Goal(s): Patient goals : Strengthen legs, decrease back pain    Short Term Goals Completed by 2 weeks Goal Status   STG 1 Independent and compliant with HEP. Met       Long Term Goals Completed by 5 weeks Goal Status   LTG 1 Improve liyah LE strength to >/= 4+/5 to improve stability with standing and walking.  Partially met   LTG 2 Pt will ambulate >/= 500' on even and uneven surfaces with LRD vs no AD with improved stability and foot clearance S/I. Met   LTG 3 Berrios >/= 47/56 to reduce pt's risk for falls. Met   LTG 4 5xSTS from chair without UEs </= 12sec to improve pt's functional strength. Met   LTG 5 DGI >/= 18/24 to improve pt's safety with ambulation. Met     Plan:  Frequency/Duration:  Plan  Plan Comment: D/C HEP  Pt to continue current HEP. See objective section for any therapeutic exercise changes, additions or modifications this date.     Therapy Time:      PT Individual Minutes  Time In: 5850  Time Out: 1280  Minutes: 46  Timed Code Treatment Minutes: 46 Minutes  Procedure Minutes:0 min  Timed Activity Minutes Units   Gait 16 1   Neuro re-ed 30 2     Electronically signed by Mario Carranza PT on 6/28/22 at 2:51 PM EDT

## 2022-08-17 ENCOUNTER — HOSPITAL ENCOUNTER (OUTPATIENT)
Dept: PHYSICAL THERAPY | Age: 77
Setting detail: THERAPIES SERIES
Discharge: HOME | End: 2022-08-17
Payer: MEDICARE

## 2022-08-17 PROCEDURE — 97110 THERAPEUTIC EXERCISES: CPT

## 2022-08-17 PROCEDURE — 97161 PT EVAL LOW COMPLEX 20 MIN: CPT

## 2022-08-17 ASSESSMENT — PAIN DESCRIPTION - FREQUENCY: FREQUENCY: INTERMITTENT

## 2022-08-17 ASSESSMENT — PAIN SCALES - GENERAL: PAINLEVEL_OUTOF10: 1

## 2022-08-17 ASSESSMENT — PAIN DESCRIPTION - ONSET: ONSET: ON-GOING

## 2022-08-17 ASSESSMENT — PAIN DESCRIPTION - LOCATION: LOCATION: KNEE

## 2022-08-17 ASSESSMENT — PAIN DESCRIPTION - PAIN TYPE: TYPE: SURGICAL PAIN

## 2022-08-17 ASSESSMENT — PAIN DESCRIPTION - DESCRIPTORS: DESCRIPTORS: SORE

## 2022-08-17 ASSESSMENT — PAIN DESCRIPTION - ORIENTATION: ORIENTATION: LEFT;POSTERIOR

## 2022-08-17 NOTE — PROGRESS NOTES
[address]     Ph: [phone]  Fax: [phone]      [x] Certification  [] Recertification [x]  Plan of Care  [] Progress Note [] Discharge      Referring Provider: Alf Todd PA-C   From:  Vidhya Garcia, PT , DPT  Patient: Carlita Novak (1 y.o. female) : 1945 Date: 2022   Medical Diagnosis: Unilateral primary osteoarthritis, left knee [M17.12] Unilateral primary osteoarthritis, left knee  Treatment Diagnosis: increased left knee pain, decreased pain free left knee ROM, decreased left LE strength & endurance & balance, decreased ability to perform functional mobility tasks, & impaired postural awareness/equal weight bearing bilateral LEs    Plan of Care/Certification Expiration Date: : 22   Progress Report Period from:  2022  to 2022    Visits to Date: 1 No Show: 0 Cancelled Appts: 0    OBJECTIVE:   Short Term Goals - Time Frame for Short term goals: 2-4 weeks    Goals Current/Discharge status  Status   Short term goal 1: The patient will demonstrate improved postural awareness/equal weight bearing requiring <25% verbal cueing during functional mobility tasks & exercises  On-going, decreased weight bearing LLE New   Short term goal 2: The patient will self-report ability to stand & ambulation >10-15 minutes consistently without increased left knee pain in order to demonstrate improved ability to perform functional mobility tasks  <10-15 minutes New   Short term goal 3: The patient will ascend/descend 5 stairs consistently with reciprocal stair pattern with unilateral rail without increased left knee pain in order to demonstrate improved ability to get in/out of friend's houses  2 stairs, bilateral rails, CGA, non-reciprocal pattern New     Long Term Goals - Time Frame for Long term goals : 4-6 weeks  Goals Current/ Discharge status Status   Long term goal 1: The patient will demonstrate competency with HEP to progress towards self management of symptoms upon D/C On-going, initiated this date New   Long term goal 2: The patient will report decreased left knee pain </=1/10 consistently in order to improve ability to perform functional mobility tasks Pain Screening  Patient Currently in Pain: Yes  Pain Assessment: 0-10  Pain Level: 1  Best Pain Level: 0  Worst Pain Level: 4  Date pain first started: 07/14/22  Patient's Stated Pain Goal: 0 - No pain  Pain Type: Surgical pain  Pain Location: Knee  Pain Orientation: Left, Posterior  Pain Descriptors: Sore  Pain Frequency: Intermittent  Pain Onset: On-going New   Long term goal 3: The patient will improve pain free left knee AROM >/= -5* to 115* in order to increase ability to perform functional mobility tasks efficiently. AROM LLE (degrees)  LLE AROM :  (slightly painful)  LLE General AROM: knee extension = lacking 18*; knee flexion = 107*   AROM RLE (degrees)  RLE General AROM: knee extension = lacking 5*; knee flexion = 115*  New   Long term goal 4: The patient will demonstrate improved L LE strength >/= 75% of R LE strength in order to perform functional mobility tasks with increased ease. Strength LLE  Comment: hip flexion = 4-/5; hip abduction = 4-/5; hip adduction = 4-/5; knee extension = 4/5; knee flexion = 4/5  Strength RLE  Comment: hip flexion = 4-/5; hip abduction = 4-/5; hip adduction = 4-/5; knee extension = 5/5; knee flexion = 5/5  New   Long term goal 5: The patient will have an increase in LEFS score >/=9 points in order to increase ability to perform functional mobility tasks & ADLs.  Exam: LEFS = 45/80  New     Body Structures, Functions, Activity Limitations Requiring Skilled Therapeutic Intervention: Decreased functional mobility , Decreased ROM, Decreased strength, Decreased posture, Increased pain, Decreased balance, Decreased endurance  Assessment: Patient is a 68year old female who present s/p L TKA by

## 2022-08-19 ENCOUNTER — HOSPITAL ENCOUNTER (OUTPATIENT)
Dept: PHYSICAL THERAPY | Age: 77
Setting detail: THERAPIES SERIES
Discharge: HOME | End: 2022-08-19
Payer: MEDICARE

## 2022-08-19 PROCEDURE — 97110 THERAPEUTIC EXERCISES: CPT

## 2022-08-19 ASSESSMENT — PAIN DESCRIPTION - LOCATION: LOCATION: KNEE

## 2022-08-19 ASSESSMENT — PAIN DESCRIPTION - ORIENTATION: ORIENTATION: LEFT

## 2022-08-19 ASSESSMENT — PAIN SCALES - GENERAL: PAINLEVEL_OUTOF10: 2

## 2022-08-19 NOTE — PROGRESS NOTES
[place]  Outpatient Physical Therapy    Treatment Note        Date: 2022  Patient: Cheryle Rothman  : 1945   Confirmed: Yes  MRN: [id number]  Referring Provider: Fela Eubanks PA-C  Secondary Referring Provider (If applicable):     Medical Diagnosis: Unilateral primary osteoarthritis, left knee [M17.12]    Treatment Diagnosis: increased left knee pain, decreased pain free left knee ROM, decreased left LE strength & endurance & balance, decreased ability to perform functional mobility tasks, & impaired postural awareness/equal weight bearing bilateral LEs    Visit Information:  Insurance: Payor: Bill Mackenziebritta / Plan: MEDICARE PART A AND B / Product Type: *No Product type* /   PT Visit Information  Onset Date: 22  Total # of Visits Approved: 99  Total # of Visits to Date: 2  Plan of Care/Certification Expiration Date: 22  No Show: 0  Progress Note Due Date: 22  Canceled Appointment: 0  Progress Note Counter: - (30 day PN due 22)    Subjective Information:  Subjective: Pt reports pain worse in the morning.   HEP Compliance:  [x] Good [] Fair [] Poor [] Reports not doing due to:    Pain Screening  Patient Currently in Pain: Yes  Pain Assessment: 0-10  Pain Level: 2  Pain Location: Knee  Pain Orientation: Left    Treatment:  Exercises:  Exercises  Exercise 1: QS on towel 5 sec x 10  Exercise 2: SLR with QS 2 x 10  Exercise 3: supine heel slides with strap, LLE 10 sec x 10  Exercise 4: TKE YTB 5 sec x 10  Exercise 5: Gait drills: F with emphasis on heel strike/retro/fwd over 6\" hurdles x3 laps ea in // bars with 0-2 UE support  Exercise 7: sink exercises x15  Exercise 8: Scifit L1.0 x 5 min to improve LE strength and ROM  Exercise 9: hamstring stretch, 1 set x 3 reps x 20-30 second hold  Exercise 20: HEP: TKE     *Indicates exercise, modality, or manual techniques to be initiated when appropriate    Objective Measures:     ROM: [] NT  [x] ROM measurements:  AROM LLE (degrees)  LLE General AROM: knee extension = lacking 10*; knee flexion = 108*          PROM LLE (degrees)  LLE General PROM: knee flexion = 112     Assessment: Body Structures, Functions, Activity Limitations Requiring Skilled Therapeutic Intervention: Decreased functional mobility , Decreased ROM, Decreased strength, Decreased posture, Increased pain, Decreased balance, Decreased endurance  Assessment: Initiated tx per PT POC with good tolerance. Pt with improving AROM measures noted vs eval. Quad lag with SLR with cues for QS between reps with good carryover. Treatment Diagnosis: increased left knee pain, decreased pain free left knee ROM, decreased left LE strength & endurance & balance, decreased ability to perform functional mobility tasks, & impaired postural awareness/equal weight bearing bilateral LEs  Therapy Prognosis: Good          Post-Pain Assessment:       Pain Rating (0-10 pain scale):   2/10   Location and pain description same as pre-treatment unless indicated.    Action: [] NA   [x] Perform HEP  [] Meds as prescribed  [] Modalities as prescribed   [] Call Physician     GOALS   Patient Goal(s): Patient goals : \"ambulation without assistive device\"    Short Term Goals Completed by 2-4 weeks Goal Status   STG 1 The patient will demonstrate improved postural awareness/equal weight bearing requiring <25% verbal cueing during functional mobility tasks & exercises In progress   STG 2 The patient will self-report ability to stand & ambulation >10-15 minutes consistently without increased left knee pain in order to demonstrate improved ability to perform functional mobility tasks In progress   STG 3 The patient will ascend/descend 5 stairs consistently with reciprocal stair pattern with unilateral rail without increased left knee pain in order to demonstrate improved ability to get in/out of friend's houses In progress       Long Term Goals Completed by 4-6 weeks Goal Status   LTG 1 The patient will demonstrate competency with HEP to progress towards self management of symptoms upon D/C In progress   LTG 2 The patient will report decreased left knee pain </=1/10 consistently in order to improve ability to perform functional mobility tasks In progress   LTG 3 The patient will improve pain free left knee AROM >/= -5* to 115* in order to increase ability to perform functional mobility tasks efficiently. In progress   LTG 4 The patient will demonstrate improved L LE strength >/= 75% of R LE strength in order to perform functional mobility tasks with increased ease. In progress   LTG 5 The patient will have an increase in LEFS score >/=9 points in order to increase ability to perform functional mobility tasks & ADLs. In progress            Plan:  Frequency/Duration:  Plan  Plan Frequency: 2-3xs/wk  Plan weeks: 6-8 weeks  Current Treatment Recommendations: Strengthening, ROM, Balance training, Functional mobility training, Endurance training, Neuromuscular re-education, Stair training, Gait training, Manual Therapy - Soft Tissue Mobilization, Manual Therapy - Joint Manipulation, Pain management, Home exercise program, Safety education & training, Patient/Caregiver education & training, Modalities, Positioning, Aquatics, Therapeutic activities  Pt to continue current HEP. See objective section for any therapeutic exercise changes, additions or modifications this date.     Therapy Time:      PT Individual Minutes  Time In: 2880  Time Out: 4727  Minutes: 40  Timed Code Treatment Minutes: 40 Minutes  Procedure Minutes: 0    Timed Activity Minutes Units   Ther Ex 40 3     Electronically signed by Andrew Delcid PTA on 8/19/22 at 8:39 AM EDT

## 2022-08-23 ENCOUNTER — HOSPITAL ENCOUNTER (OUTPATIENT)
Dept: PHYSICAL THERAPY | Age: 77
Setting detail: THERAPIES SERIES
Discharge: HOME | End: 2022-08-23
Payer: MEDICARE

## 2022-08-23 PROCEDURE — 97140 MANUAL THERAPY 1/> REGIONS: CPT

## 2022-08-23 PROCEDURE — 97110 THERAPEUTIC EXERCISES: CPT

## 2022-08-23 PROCEDURE — 97016 VASOPNEUMATIC DEVICE THERAPY: CPT

## 2022-08-23 ASSESSMENT — PAIN DESCRIPTION - DESCRIPTORS: DESCRIPTORS: SORE

## 2022-08-23 ASSESSMENT — PAIN DESCRIPTION - ORIENTATION: ORIENTATION: LEFT

## 2022-08-23 ASSESSMENT — PAIN DESCRIPTION - LOCATION: LOCATION: KNEE

## 2022-08-23 ASSESSMENT — PAIN SCALES - GENERAL: PAINLEVEL_OUTOF10: 4

## 2022-08-23 ASSESSMENT — PAIN DESCRIPTION - PAIN TYPE: TYPE: SURGICAL PAIN

## 2022-08-23 NOTE — PROGRESS NOTES
[facility]  Outpatient Physical Therapy    Treatment Note        Date: 2022  Patient: Rudy Cowart  : 1945   Confirmed: Yes  MRN: [id number]  Referring Provider: Coleman Wilson PA-C  Secondary Referring Provider (If applicable):     Medical Diagnosis: Unilateral primary osteoarthritis, left knee [M17.12]    Treatment Diagnosis: increased left knee pain, decreased pain free left knee ROM, decreased left LE strength & endurance & balance, decreased ability to perform functional mobility tasks, & impaired postural awareness/equal weight bearing bilateral LEs    Visit Information:  Insurance: Payor: Yoko Gloveros / Plan: MEDICARE PART A AND B / Product Type: *No Product type* /   PT Visit Information  Onset Date: 22  Total # of Visits Approved: 99  Total # of Visits to Date: 3  Plan of Care/Certification Expiration Date: 22  No Show: 0  Progress Note Due Date: 22  Canceled Appointment: 0  Progress Note Counter: 3/4- (30 day PN due 22)    Subjective Information:  Subjective: Patient self-reports increased pain/soreness this date which she attributes to \"doing a little more this weekend. \"  RTD = 6 weeks  HEP Compliance:  [x] Good [] Fair [] Poor [] Reports not doing due to:    Pain Screening  Patient Currently in Pain: Yes  Pain Assessment: 0-10  Pain Level: 4 (3-4/10)  Pain Type: Surgical pain  Pain Location: Knee  Pain Orientation: Left  Pain Descriptors: Sore    Treatment:  Exercises:  Exercises  Exercise 1: QS 5 sec x 15 reps x 1 set, supine  Exercise 3: supine heel slides with strap, LLE 10 sec x 15 reps x 1 set  Exercise 8: Scifit L1.0 x 6 min to improve LE strength and ROM  Exercise 9: hamstring stretch & quad/hip flexor on 2nd step, 1 set x 3 reps x 20-30 second hold each stretch LLE  Exercise 10: supine bridges with TA activation, 1 set x 10 reps x 5 second hold  Exercise 11: SAQ, 1 set x 15 reps x 5 seconds, supine over bolster  Exercise 20: HEP: continue with current + bridge & SAQ       Manual:   Manual Therapy  Joint Mobilization: left knee*  Soft Tissue Mobilizaton: left knee scar tissue & edema massage, peripendicular, x8 minutes  Other: KT taping* cupping*       Modalities:  Vasopneumatic Device (CPT [id number])  Patient Position: Supine (LE elevated)  Vasopneumatic Specified Location: left knee  Pre-Girth Measurement: 47.1 cm  Post-Girth Measurement: 46.1 cm  Post treatment skin assessment: Redness - no adverse reaction       *Indicates exercise, modality, or manual techniques to be initiated when appropriate    Objective Measures:        Strength: [x] NT  [] MMT completed:      ROM: [] NT  [x] ROM measurements:     AROM LLE (degrees)  LLE General AROM: knee extension = lacking 9*; knee flexion = 108*        Assessment: Body Structures, Functions, Activity Limitations Requiring Skilled Therapeutic Intervention: Decreased functional mobility , Decreased ROM, Decreased strength, Decreased posture, Increased pain, Decreased balance, Decreased endurance  Assessment: Continued with patient's PT POC with addition of bridges & SAQ for continued focus on improving left knee ROM. Improvements in both flexion & extension of left knee ROM noted this date with no increase in pain at end ranges. Patient declined CP this date, will ice at home. Treatment Diagnosis: increased left knee pain, decreased pain free left knee ROM, decreased left LE strength & endurance & balance, decreased ability to perform functional mobility tasks, & impaired postural awareness/equal weight bearing bilateral LEs  Therapy Prognosis: Good    Post-Pain Assessment:       Pain Rating (0-10 pain scale):   2/10   Location and pain description same as pre-treatment unless indicated.    Action: [] NA   [x] Perform HEP  [] Meds as prescribed  [x] Modalities as prescribed   [] Call Physician     GOALS   Patient Goal(s): Patient goals : \"ambulation without assistive device\"    Short Term Goals Completed by 2-4 weeks Goal Status   STG 1 The patient will demonstrate improved postural awareness/equal weight bearing requiring <25% verbal cueing during functional mobility tasks & exercises In progress   STG 2 The patient will self-report ability to stand & ambulation >10-15 minutes consistently without increased left knee pain in order to demonstrate improved ability to perform functional mobility tasks In progress   STG 3 The patient will ascend/descend 5 stairs consistently with reciprocal stair pattern with unilateral rail without increased left knee pain in order to demonstrate improved ability to get in/out of friend's houses In progress     Long Term Goals Completed by 4-6 weeks Goal Status   LTG 1 The patient will demonstrate competency with HEP to progress towards self management of symptoms upon D/C In progress   LTG 2 The patient will report decreased left knee pain </=1/10 consistently in order to improve ability to perform functional mobility tasks In progress   LTG 3 The patient will improve pain free left knee AROM >/= -5* to 115* in order to increase ability to perform functional mobility tasks efficiently. In progress   LTG 4 The patient will demonstrate improved L LE strength >/= 75% of R LE strength in order to perform functional mobility tasks with increased ease. In progress   LTG 5 The patient will have an increase in LEFS score >/=9 points in order to increase ability to perform functional mobility tasks & ADLs.  In progress       Plan:  Frequency/Duration:  Plan  Plan Frequency: 2-3xs/wk  Plan weeks: 6-8 weeks  Current Treatment Recommendations: Strengthening, ROM, Balance training, Functional mobility training, Endurance training, Neuromuscular re-education, Stair training, Gait training, Manual Therapy - Soft Tissue Mobilization, Manual Therapy - Joint Manipulation, Pain management, Home exercise program, Safety education & training, Patient/Caregiver education & training, Modalities, Positioning, Aquatics, Therapeutic activities  Pt to continue current HEP. See objective section for any therapeutic exercise changes, additions or modifications this date.     Therapy Time:      PT Individual Minutes  Time In: 2173  Time Out: 5295  Minutes: 48  Timed Code Treatment Minutes: 38 Minutes  Procedure Minutes: 10 minutes CP & compression  Timed Activity Minutes Units   Ther Ex 30 2   Manual  8 1     Electronically signed by Jose D Dutta PT on 8/23/22 at 1:48 PM EDT

## 2022-08-24 ENCOUNTER — HOSPITAL ENCOUNTER (OUTPATIENT)
Dept: PHYSICAL THERAPY | Age: 77
Setting detail: THERAPIES SERIES
Discharge: HOME | End: 2022-08-24
Payer: MEDICARE

## 2022-08-24 PROCEDURE — 97110 THERAPEUTIC EXERCISES: CPT

## 2022-08-24 PROCEDURE — 97140 MANUAL THERAPY 1/> REGIONS: CPT

## 2022-08-24 NOTE — PROGRESS NOTES
[facility]  Outpatient Physical Therapy    Treatment Note        Date: 2022  Patient: Lorice Schwab  : 1945   Confirmed: Yes  MRN: [id number]  Referring Provider: Andrae Loya PA-C  Secondary Referring Provider (If applicable):     Medical Diagnosis: Unilateral primary osteoarthritis, left knee [M17.12]    Treatment Diagnosis: increased left knee pain, decreased pain free left knee ROM, decreased left LE strength & endurance & balance, decreased ability to perform functional mobility tasks, & impaired postural awareness/equal weight bearing bilateral LEs    Visit Information:  Insurance: Payor: Ariella Alba / Plan: MEDICARE PART A AND B / Product Type: *No Product type* /   PT Visit Information  Onset Date: 22  Total # of Visits Approved: 99  Total # of Visits to Date: 4  Plan of Care/Certification Expiration Date: 22  No Show: 0  Progress Note Due Date: 22  Canceled Appointment: 0  Progress Note Counter: - (30 day PN due 22)    Subjective Information:  Subjective: Patient denies pain this date, felt good after yesterday's session.   HEP Compliance:  [x] Good [] Fair [] Poor [] Reports not doing due to:    Pain Screening  Patient Currently in Pain: Denies    Treatment:  Exercises:  Exercises  Exercise 3: supine heel slides with strap, LLE 10 sec x 15 reps x 1 set  Exercise 4: TKE, YTB, 5 sec x 15 reps x 1 set  Exercise 8: Scifit L2.0 x 5 min to improve LE strength and ROM  Exercise 9: hamstring stretch & quad/hip flexor on 2nd step, 1 set x 3 reps x 20-30 second hold each stretch LLE  Exercise 11: SAQ, 2 sets x 15 reps x 5 seconds, supine over bolster  Exercise 12: hip 4-way, YTB, bilateral LEs, bilateral UE support, 1 set x 10 reps each motion each LE  Exercise 20: HEP: continue with current + hip 4-way     Manual:   Manual Therapy  Joint Mobilization: left knee*  Soft Tissue Mobilizaton: left knee scar tissue & edema massage, peripendicular, x8 minutes  Other: KT taping* cupping*    *Indicates exercise, modality, or manual techniques to be initiated when appropriate    Objective Measures:       Strength: [x] NT  [] MMT completed:       ROM: [] NT  [x] ROM measurements:     AROM LLE (degrees)  LLE General AROM: knee extension = lacking 9*; knee flexion = 108*     Assessment: Body Structures, Functions, Activity Limitations Requiring Skilled Therapeutic Intervention: Decreased functional mobility , Decreased ROM, Decreased strength, Decreased posture, Increased pain, Decreased balance, Decreased endurance  Assessment: Continued with patient's PT POC with addition of 4-way hip for focus on overall bilateral LE strengthening & endurance in weight bearing position. Slight improvement in flexion of left knee ROM noted this date with no increase in pain at end ranges. Patient declined CP this date, will ice at home. Treatment Diagnosis: increased left knee pain, decreased pain free left knee ROM, decreased left LE strength & endurance & balance, decreased ability to perform functional mobility tasks, & impaired postural awareness/equal weight bearing bilateral LEs  Therapy Prognosis: Good     Post-Pain Assessment:       Pain Rating (0-10 pain scale):   0/10   Location and pain description same as pre-treatment unless indicated.    Action: [] NA   [x] Perform HEP  [] Meds as prescribed  [x] Modalities as prescribed   [] Call Physician     GOALS   Patient Goal(s): Patient goals : \"ambulation without assistive device\"    Short Term Goals Completed by 2-4 weeks Goal Status   STG 1 The patient will demonstrate improved postural awareness/equal weight bearing requiring <25% verbal cueing during functional mobility tasks & exercises In progress   STG 2 The patient will self-report ability to stand & ambulation >10-15 minutes consistently without increased left knee pain in order to demonstrate improved ability to perform functional mobility tasks In progress   STG 3 Jayson PT on 8/24/22 at 3:45 PM EDT

## 2022-08-29 ENCOUNTER — HOSPITAL ENCOUNTER (OUTPATIENT)
Dept: PHYSICAL THERAPY | Age: 77
Setting detail: THERAPIES SERIES
Discharge: HOME | End: 2022-08-29
Payer: MEDICARE

## 2022-08-29 PROCEDURE — 97110 THERAPEUTIC EXERCISES: CPT

## 2022-08-29 PROCEDURE — 97016 VASOPNEUMATIC DEVICE THERAPY: CPT

## 2022-08-29 PROCEDURE — 97140 MANUAL THERAPY 1/> REGIONS: CPT

## 2022-08-29 ASSESSMENT — PAIN SCALES - GENERAL: PAINLEVEL_OUTOF10: 1

## 2022-08-29 NOTE — PROGRESS NOTES
[facility]  Outpatient Physical Therapy    Treatment Note        Date: 2022  Patient: Gypsy Andrade  : 1945   Confirmed: Yes  MRN: [id number]  Referring Provider: Jer Link PA-C  Secondary Referring Provider (If applicable):     Medical Diagnosis: Unilateral primary osteoarthritis, left knee [M17.12]    Treatment Diagnosis: increased left knee pain, decreased pain free left knee ROM, decreased left LE strength & endurance & balance, decreased ability to perform functional mobility tasks, & impaired postural awareness/equal weight bearing bilateral LEs    Visit Information:  Insurance: Payor: Venda Gut / Plan: MEDICARE PART A AND B / Product Type: *No Product type* /   PT Visit Information  Onset Date: 22  Total # of Visits Approved: 99  Total # of Visits to Date: 5  Plan of Care/Certification Expiration Date: 22  No Show: 0  Progress Note Due Date: 22  Canceled Appointment: 0  Progress Note Counter:  (30 day PN due 22)    Subjective Information:  Subjective: Patient self-reports doing \"a lot\" this weekend including a lot of standing & walking, requiring pain medication.   HEP Compliance:  [x] Good [] Fair [] Poor [] Reports not doing due to:    Pain Screening  Patient Currently in Pain: Yes  Pain Assessment: 0-10  Pain Level: 1    Treatment:  Exercises:  Exercises  Exercise 1: QS 5 sec x 15 reps x 1 set, supine  Exercise 3: supine heel slides with strap, LLE 10 sec x 15 reps x 1 set  Exercise 8: Scifit L2.0 x 6 min to improve LE strength and ROM  Exercise 12: hip 4-way, YTB, bilateral LEs, bilateral UE support, 1 set x 12 reps each motion each LE  Exercise 17: small hurdles, 5 laps lateral, alternating which LE leading, intermittent UE support on counter  Exercise 18: small hurdles, 5 laps forward, alternating which LE leading, intermittent UE support on counter  Exercise 20: HEP: continue with current       Manual:   Manual Therapy  Joint Mobilization: left knee*  Soft Tissue Mobilizaton: left knee scar tissue & edema massage, peripendicular, x8 minutes  Other: KT taping* cupping*       Modalities:  Vasopneumatic Device (CPT [id number])  Patient Position: Supine (LE elevated)  Vasopneumatic Specified Location: left knee  Pre-Girth Measurement: 46.5 cm  Post-Girth Measurement: 46.0 cm  Post treatment skin assessment: Redness - no adverse reaction       *Indicates exercise, modality, or manual techniques to be initiated when appropriate    Objective Measures:       Strength: [x] NT  [] MMT completed:      ROM: [x] NT  [] ROM measurements:     AROM LLE (degrees)  LLE General AROM: knee extension = lacking 7*; knee flexion = 108*       Assessment: Body Structures, Functions, Activity Limitations Requiring Skilled Therapeutic Intervention: Decreased functional mobility , Decreased ROM, Decreased strength, Decreased posture, Increased pain, Decreased balance, Decreased endurance  Assessment: Continued with patient's PT POC with addition of forward & side-stepping with hurdles for focus on overall bilateral LE strengthening & endurance & balance in weight bearing position. Slight improvement in flexion & extension of left knee ROM noted this date with no increase in pain at end ranges. Patient would continue to benefit from balance activities as notable Trendelenburg gait without use of cane this date as well as self-report of decreased balance without cane. Patient agreeable to compression & CP this date. Treatment Diagnosis: increased left knee pain, decreased pain free left knee ROM, decreased left LE strength & endurance & balance, decreased ability to perform functional mobility tasks, & impaired postural awareness/equal weight bearing bilateral LEs  Therapy Prognosis: Good    Post-Pain Assessment:       Pain Rating (0-10 pain scale):  0 /10   Location and pain description same as pre-treatment unless indicated.    Action: [] NA   [x] Perform HEP  [] Meds as prescribed  [x] Modalities as prescribed   [] Call Physician     GOALS   Patient Goal(s): Patient goals : \"ambulation without assistive device\"    Short Term Goals Completed by 2-4 weeks Goal Status   STG 1 The patient will demonstrate improved postural awareness/equal weight bearing requiring <25% verbal cueing during functional mobility tasks & exercises In progress   STG 2 The patient will self-report ability to stand & ambulation >10-15 minutes consistently without increased left knee pain in order to demonstrate improved ability to perform functional mobility tasks In progress   STG 3 The patient will ascend/descend 5 stairs consistently with reciprocal stair pattern with unilateral rail without increased left knee pain in order to demonstrate improved ability to get in/out of friend's houses In progress     Long Term Goals Completed by 4-6 weeks Goal Status   LTG 1 The patient will demonstrate competency with HEP to progress towards self management of symptoms upon D/C In progress   LTG 2 The patient will report decreased left knee pain </=1/10 consistently in order to improve ability to perform functional mobility tasks In progress   LTG 3 The patient will improve pain free left knee AROM >/= -5* to 115* in order to increase ability to perform functional mobility tasks efficiently. In progress   LTG 4 The patient will demonstrate improved L LE strength >/= 75% of R LE strength in order to perform functional mobility tasks with increased ease. In progress   LTG 5 The patient will have an increase in LEFS score >/=9 points in order to increase ability to perform functional mobility tasks & ADLs.  In progress       Plan:  Frequency/Duration:  Plan  Plan Frequency: 2-3xs/wk  Plan weeks: 6-8 weeks  Current Treatment Recommendations: Strengthening, ROM, Balance training, Functional mobility training, Endurance training, Neuromuscular re-education, Stair training, Gait training, Manual Therapy - Soft Tissue Mobilization, Manual Therapy - Joint Manipulation, Pain management, Home exercise program, Safety education & training, Patient/Caregiver education & training, Modalities, Positioning, Aquatics, Therapeutic activities  Pt to continue current HEP. See objective section for any therapeutic exercise changes, additions or modifications this date.     Therapy Time:      PT Individual Minutes  Time In: 1030  Time Out: 1118  Minutes: 48  Timed Code Treatment Minutes: 38 Minutes  Procedure Minutes: 10 minutes compression + CP  Timed Activity Minutes Units   Ther Ex 30 2   Manual  8 1     Electronically signed by Lorelei Craven, PT on 8/29/22 at 11:32 AM EDT

## 2022-08-31 ENCOUNTER — HOSPITAL ENCOUNTER (OUTPATIENT)
Dept: PHYSICAL THERAPY | Age: 77
Setting detail: THERAPIES SERIES
Discharge: HOME | End: 2022-08-31
Payer: MEDICARE

## 2022-08-31 PROCEDURE — 97016 VASOPNEUMATIC DEVICE THERAPY: CPT

## 2022-08-31 PROCEDURE — 97110 THERAPEUTIC EXERCISES: CPT

## 2022-08-31 ASSESSMENT — PAIN DESCRIPTION - DESCRIPTORS: DESCRIPTORS: SORE

## 2022-08-31 ASSESSMENT — PAIN DESCRIPTION - ORIENTATION: ORIENTATION: LEFT

## 2022-08-31 ASSESSMENT — PAIN SCALES - GENERAL: PAINLEVEL_OUTOF10: 1

## 2022-08-31 ASSESSMENT — PAIN DESCRIPTION - LOCATION: LOCATION: KNEE

## 2022-08-31 NOTE — PROGRESS NOTES
[facility]  Outpatient Physical Therapy    Treatment Note        Date: 2022  Patient: Morris Jorge  : 1945   Confirmed: Yes  MRN: [id number]  Referring Provider: Madalyn Ramirez PA-C  Secondary Referring Provider (If applicable):     Medical Diagnosis: Unilateral primary osteoarthritis, left knee [M17.12]    Treatment Diagnosis: increased left knee pain, decreased pain free left knee ROM, decreased left LE strength & endurance & balance, decreased ability to perform functional mobility tasks, & impaired postural awareness/equal weight bearing bilateral LEs    Visit Information:  Insurance: Payor: Zaratere Davy / Plan: MEDICARE PART A AND B / Product Type: *No Product type* /   PT Visit Information  Onset Date: 22  Total # of Visits Approved: 99  Total # of Visits to Date: 6  Plan of Care/Certification Expiration Date: 22  No Show: 0  Progress Note Due Date: 22  Canceled Appointment: 0  Progress Note Counter: - (30 day PN due 22)    Subjective Information:  Subjective: Pt reports L knee continues to feel tender in the crease behind her knee. Notes she has been working on knee extension stretches.   HEP Compliance:  [x] Good [] Fair [] Poor [] Reports not doing due to:    Pain Screening  Patient Currently in Pain: Yes  Pain Assessment: 0-10  Pain Level: 1  Pain Location: Knee  Pain Orientation: Left  Pain Descriptors: Sore    Treatment:  Exercises:  Exercises  Exercise 1: DKTC w/ swiss ball: x 3 min (focus on knee flex ROM)  Exercise 2: SAQ over bolster: 5\" x 20  Exercise 3: Fitter leg press: 2 x 15 liyah  Exercise 4: Step-ups: 4\" x 15 liyah, 6\" x 15 LLE (liyah UE support)  Exercise 5: passive L knee extension stretches: 30-45\" holds x 3  Exercise 6: NS: L3 x 5 min, LEs only  Exercise 20: HEP: continue with current         Modalities:  Vasopneumatic Device (CPT [id number])  Patient Position: Supine  Vasopneumatic Specified Location: left knee  Pre-Girth Measurement: 47.0 cm  Post-Girth Measurement: 46.6 cm  Post treatment skin assessment: Redness - no adverse reaction       *Indicates exercise, modality, or manual techniques to be initiated when appropriate    Objective Measures:     Strength: [x] NT  [] MMT completed:      ROM: [] NT  [x] ROM measurements:     AROM LLE (degrees)  LLE General AROM: knee extension = lacking 7*; knee flexion = 108*       Assessment: Body Structures, Functions, Activity Limitations Requiring Skilled Therapeutic Intervention: Decreased functional mobility , Decreased ROM, Decreased strength, Decreased posture, Increased pain, Decreased balance, Decreased endurance  Assessment: Mild knee ROM limitations still noted in L knee flex and terminal extension. New stretches implemented to address both limitations. Advanced strengthening exercises to promote gait stability and improved stair negotiation. Exercises deferred on R leg d/t knee pain but tolerated well on surgical LLE. Continued use of pneumatic compression with ice for edema mgmt. Treatment Diagnosis: increased left knee pain, decreased pain free left knee ROM, decreased left LE strength & endurance & balance, decreased ability to perform functional mobility tasks, & impaired postural awareness/equal weight bearing bilateral LEs  Therapy Prognosis: Good          Post-Pain Assessment:       Pain Rating (0-10 pain scale):   1/10   Location and pain description same as pre-treatment unless indicated.    Action: [x] NA   [] Perform HEP  [] Meds as prescribed  [] Modalities as prescribed   [] Call Physician     GOALS   Patient Goal(s): Patient goals : \"ambulation without assistive device\"    Short Term Goals Completed by 2-4 weeks Goal Status   STG 1 The patient will demonstrate improved postural awareness/equal weight bearing requiring <25% verbal cueing during functional mobility tasks & exercises In progress   STG 2 The patient will self-report ability to stand & ambulation >10-15 minutes consistently without increased left knee pain in order to demonstrate improved ability to perform functional mobility tasks In progress   STG 3 The patient will ascend/descend 5 stairs consistently with reciprocal stair pattern with unilateral rail without increased left knee pain in order to demonstrate improved ability to get in/out of friend's houses In progress               Long Term Goals Completed by 4-6 weeks Goal Status   LTG 1 The patient will demonstrate competency with HEP to progress towards self management of symptoms upon D/C In progress   LTG 2 The patient will report decreased left knee pain </=1/10 consistently in order to improve ability to perform functional mobility tasks In progress   LTG 3 The patient will improve pain free left knee AROM >/= -5* to 115* in order to increase ability to perform functional mobility tasks efficiently. In progress   LTG 4 The patient will demonstrate improved L LE strength >/= 75% of R LE strength in order to perform functional mobility tasks with increased ease. In progress   LTG 5 The patient will have an increase in LEFS score >/=9 points in order to increase ability to perform functional mobility tasks & ADLs. In progress                                Plan:  Frequency/Duration:  Plan  Plan Frequency: 2-3xs/wk  Plan weeks: 6-8 weeks  Current Treatment Recommendations: Strengthening, ROM, Balance training, Functional mobility training, Endurance training, Neuromuscular re-education, Stair training, Gait training, Manual Therapy - Soft Tissue Mobilization, Manual Therapy - Joint Manipulation, Pain management, Home exercise program, Safety education & training, Patient/Caregiver education & training, Modalities, Positioning, Aquatics, Therapeutic activities  Pt to continue current HEP. See objective section for any therapeutic exercise changes, additions or modifications this date.     Therapy Time:      PT Individual Minutes  Time In: 6618  Time Out: 1430  Minutes: 52  Timed Code Treatment Minutes: 40 Minutes  Procedure Minutes: 10 min pneumatic compress CP  Timed Activity Minutes Units   Ther Ex 40 3          Electronically signed by Yoly Engel PT on 8/31/22 at 4:50 PM EDT

## 2022-09-07 ENCOUNTER — HOSPITAL ENCOUNTER (OUTPATIENT)
Dept: PHYSICAL THERAPY | Age: 77
Setting detail: THERAPIES SERIES
Discharge: HOME | End: 2022-09-07
Payer: MEDICARE

## 2022-09-07 PROCEDURE — 97140 MANUAL THERAPY 1/> REGIONS: CPT

## 2022-09-07 PROCEDURE — 97016 VASOPNEUMATIC DEVICE THERAPY: CPT

## 2022-09-07 PROCEDURE — 97110 THERAPEUTIC EXERCISES: CPT

## 2022-09-07 ASSESSMENT — PAIN DESCRIPTION - ORIENTATION: ORIENTATION: LEFT

## 2022-09-07 ASSESSMENT — PAIN DESCRIPTION - DESCRIPTORS: DESCRIPTORS: SORE

## 2022-09-07 ASSESSMENT — PAIN DESCRIPTION - FREQUENCY: FREQUENCY: INTERMITTENT

## 2022-09-07 ASSESSMENT — PAIN DESCRIPTION - PAIN TYPE: TYPE: SURGICAL PAIN

## 2022-09-07 ASSESSMENT — PAIN DESCRIPTION - ONSET: ONSET: ON-GOING

## 2022-09-07 ASSESSMENT — PAIN DESCRIPTION - LOCATION: LOCATION: KNEE

## 2022-09-07 ASSESSMENT — PAIN SCALES - GENERAL: PAINLEVEL_OUTOF10: 3

## 2022-09-07 NOTE — PROGRESS NOTES
[facility]  Outpatient Physical Therapy    Treatment Note        Date: 2022  Patient: Rupert Clifton  : 1945   Confirmed: Yes  MRN: [id number]  Referring Provider: Sierra Hendrickson PA-C    Medical Diagnosis: Unilateral primary osteoarthritis, left knee [M17.12]       Treatment Diagnosis: increased left knee pain, decreased pain free left knee ROM, decreased left LE strength & endurance & balance, decreased ability to perform functional mobility tasks, & impaired postural awareness/equal weight bearing bilateral LEs    Visit Information:  Insurance: Payor: Tram Hogue / Plan: MEDICARE PART A AND B / Product Type: *No Product type* /   PT Visit Information  Onset Date: 22  PT Insurance Information: Medicare  Total # of Visits Approved: 99  Total # of Visits to Date: 7  Plan of Care/Certification Expiration Date: 22  No Show: 0  Progress Note Due Date: 22  Canceled Appointment: 0  Progress Note Counter:  (30 day PN due 22)    Subjective Information:  Subjective: Patient reports increased ambulation over Labor Day weekend which slightly increased left knee pain. Patient reports intermittent compliance with HEP over the weekend.   HEP Compliance:  [x] Good [] Fair [] Poor [] Reports not doing due to:    Pain Screening  Patient Currently in Pain: Yes  Pain Assessment: 0-10  Pain Level: 3  Pain Type: Surgical pain  Pain Location: Knee  Pain Orientation: Left  Pain Descriptors: Sore  Pain Frequency: Intermittent  Pain Onset: On-going    Treatment:  Exercises:  Exercises  Exercise 1: SLR with QS 5 sec x 15 reps x 2 sets, supine  Exercise 3: supine heel slides with strap, LLE 5 sec x 15 reps x 1 set  Exercise 6: side lying hip abduction/adduction, bilaterally, 1 set x 10 reps each exercise each LE  Exercise 8: Scifit L2.5 x 6 min to improve LE strength and ROM, LEs only  Exercise 15: monster walks along counter, YTB around ankles, intermittent unilateral UE support, 5 laps with a few standing rest breaks  Exercise 16: side stepping along counter, YTB around ankles, intermittent unilateral UE support, 5 laps with a few standing rest breaks  Exercise 20: HEP: continue with current + side stepping & monster walks       Manual:   Manual Therapy  Joint Mobilization: left knee*  Soft Tissue Mobilizaton: left knee scar tissue & edema massage, peripendicular, x8 minutes  Other: KT taping* cupping*       Modalities:  Vasopneumatic Device (CPT [id number])  Patient Position: Supine  Vasopneumatic Specified Location: left knee  Pre-Girth Measurement: 47.0 cm  Post-Girth Measurement: 46.8 cm  Post treatment skin assessment: Redness - no adverse reaction       *Indicates exercise, modality, or manual techniques to be initiated when appropriate    Objective Measures:      Strength: [x] NT  [] MMT completed:       ROM: [] NT  [x] ROM measurements:     AROM LLE (degrees)  LLE General AROM: knee extension = lacking 6*; knee flexion = 108*      Assessment: Body Structures, Functions, Activity Limitations Requiring Skilled Therapeutic Intervention: Decreased functional mobility , Decreased ROM, Decreased strength, Decreased posture, Increased pain, Decreased balance, Decreased endurance  Assessment: Continued with patient's PT POC with addition of side-stepping & monster walks with resistance for focus on overall bilateral LE strengthening & endurance & balance in weight bearing position. Slight improvement in extension of left knee ROM noted this date with no increase in pain at end ranges. Patient agreeable to compression & CP this date. Patient to begin to schedule aquatics PT sessions following her vacation next week.   Treatment Diagnosis: increased left knee pain, decreased pain free left knee ROM, decreased left LE strength & endurance & balance, decreased ability to perform functional mobility tasks, & impaired postural awareness/equal weight bearing bilateral LEs  Therapy Prognosis: Good    Post-Pain Assessment:       Pain Rating (0-10 pain scale):   2/10   Location and pain description same as pre-treatment unless indicated. Action: [] NA   [x] Perform HEP  [] Meds as prescribed  [x] Modalities as prescribed   [] Call Physician     GOALS   Patient Goal(s): Patient goals : \"ambulation without assistive device\"    Short Term Goals Completed by 2-4 weeks Goal Status   STG 1 The patient will demonstrate improved postural awareness/equal weight bearing requiring <25% verbal cueing during functional mobility tasks & exercises In progress   STG 2 The patient will self-report ability to stand & ambulation >10-15 minutes consistently without increased left knee pain in order to demonstrate improved ability to perform functional mobility tasks In progress   STG 3 The patient will ascend/descend 5 stairs consistently with reciprocal stair pattern with unilateral rail without increased left knee pain in order to demonstrate improved ability to get in/out of friend's houses In progress     Long Term Goals Completed by 4-6 weeks Goal Status   LTG 1 The patient will demonstrate competency with HEP to progress towards self management of symptoms upon D/C In progress   LTG 2 The patient will report decreased left knee pain </=1/10 consistently in order to improve ability to perform functional mobility tasks In progress   LTG 3 The patient will improve pain free left knee AROM >/= -5* to 115* in order to increase ability to perform functional mobility tasks efficiently. In progress   LTG 4 The patient will demonstrate improved L LE strength >/= 75% of R LE strength in order to perform functional mobility tasks with increased ease. In progress   LTG 5 The patient will have an increase in LEFS score >/=9 points in order to increase ability to perform functional mobility tasks & ADLs.  In progress       Plan:  Frequency/Duration:  Plan  Plan Frequency: 2-3xs/wk  Plan weeks: 6-8 weeks  Current Treatment Recommendations: Strengthening, ROM, Balance training, Functional mobility training, Endurance training, Neuromuscular re-education, Stair training, Gait training, Manual Therapy - Soft Tissue Mobilization, Manual Therapy - Joint Manipulation, Pain management, Home exercise program, Safety education & training, Patient/Caregiver education & training, Modalities, Positioning, Aquatics, Therapeutic activities  Pt to continue current HEP. See objective section for any therapeutic exercise changes, additions or modifications this date.     Therapy Time:      PT Individual Minutes  Time In: 1120  Time Out: 0675  Minutes: 48  Timed Code Treatment Minutes: 38 Minutes  Procedure Minutes: 10 minutes CP & compression  Timed Activity Minutes Units   Ther Ex 30 2   Manual  8 1     Electronically signed by Jose D Dutta PT on 9/7/22 at 12:13 PM EDT

## 2022-09-09 ENCOUNTER — HOSPITAL ENCOUNTER (OUTPATIENT)
Dept: PHYSICAL THERAPY | Age: 77
Setting detail: THERAPIES SERIES
Discharge: HOME | End: 2022-09-09
Payer: MEDICARE

## 2022-09-09 PROCEDURE — 97110 THERAPEUTIC EXERCISES: CPT

## 2022-09-09 PROCEDURE — 97140 MANUAL THERAPY 1/> REGIONS: CPT

## 2022-09-09 ASSESSMENT — PAIN DESCRIPTION - PAIN TYPE: TYPE: SURGICAL PAIN

## 2022-09-09 ASSESSMENT — PAIN SCALES - GENERAL: PAINLEVEL_OUTOF10: 2

## 2022-09-09 ASSESSMENT — PAIN DESCRIPTION - LOCATION: LOCATION: KNEE

## 2022-09-09 ASSESSMENT — PAIN DESCRIPTION - DESCRIPTORS: DESCRIPTORS: SORE

## 2022-09-09 ASSESSMENT — PAIN DESCRIPTION - ORIENTATION: ORIENTATION: LEFT

## 2022-09-09 NOTE — PROGRESS NOTES
[facility]  Outpatient Physical Therapy    Treatment Note        Date: 2022  Patient: Jc Dawson  : 1945   Confirmed: Yes  MRN: [id number]  Referring Provider: Ean Galindo PA-C    Medical Diagnosis: Unilateral primary osteoarthritis, left knee [M17.12]       Treatment Diagnosis: increased left knee pain, decreased pain free left knee ROM, decreased left LE strength & endurance & balance, decreased ability to perform functional mobility tasks, & impaired postural awareness/equal weight bearing bilateral LEs    Visit Information:  Insurance: Payor: Debra Miladys / Plan: MEDICARE PART A AND B / Product Type: *No Product type* /   PT Visit Information  Onset Date: 22  PT Insurance Information: Medicare  Total # of Visits Approved: 99  Total # of Visits to Date: 8  Plan of Care/Certification Expiration Date: 22  No Show: 0  Progress Note Due Date: 22  Canceled Appointment: 0  Progress Note Counter: - (30 day PN due 22)    Subjective Information:  Subjective: Patient reports \"falling\" yesterday in [place] following dragging her foot on the ground. Patient doesn't officially know if she banged her left knee or not.   HEP Compliance:  [x] Good [] Fair [] Poor [] Reports not doing due to:    Pain Screening  Patient Currently in Pain: Yes  Pain Assessment: 0-10  Pain Level: 2  Pain Type: Surgical pain  Pain Location: Knee  Pain Orientation: Left  Pain Descriptors: Sore    Treatment:  Exercises:  Exercises  Exercise 1: SLR with QS 5 sec x 15 reps x 2 sets, supine  Exercise 3: supine heel slides with strap, LLE 5 sec x 15 reps x 1 set  Exercise 5: side-lying clams & reverse clams, bilaterally, 2 sets x 10 reps each exercise each LE  Exercise 6: side lying hip abduction/adduction, bilaterally, 2 sets x 10 reps each exercise each LE  Exercise 8: Scifit L3.0 x 6 min to improve LE strength and ROM, LEs only  Exercise 20: HEP: continue with current + side stepping & monster walks       Manual:   Manual Therapy  Joint Mobilization: left knee, flexion & extension, with distractional techniques & OP  Soft Tissue Mobilizaton: left knee scar tissue & edema massage, peripendicular  Other: KT taping* cupping* x15 minute total manual    *Indicates exercise, modality, or manual techniques to be initiated when appropriate    Objective Measures:     Strength: [x] NT  [] MMT completed:    ROM: [x] NT  [] ROM measurements:     AROM LLE (degrees)  LLE General AROM: knee extension = lacking 8*; knee flexion = 106*     Assessment: Body Structures, Functions, Activity Limitations Requiring Skilled Therapeutic Intervention: Decreased functional mobility , Decreased ROM, Decreased strength, Decreased posture, Increased pain, Decreased balance, Decreased endurance  Assessment: Continued with patient's PT POC with addition of side-lying clams & reverse clams with resistance for focus on overall bilateral LE strengthening & endurance & balance in non-weight bearing position. Slight improvement in flexion of left knee ROM noted this date following manual with slight increase in pain at end range. Slight decreased left knee extension this date which may be due to patient's recent fall. Patient reports that she will ice at home following session. Patient to begin to schedule aquatics PT sessions following her vacation next week. Treatment Diagnosis: increased left knee pain, decreased pain free left knee ROM, decreased left LE strength & endurance & balance, decreased ability to perform functional mobility tasks, & impaired postural awareness/equal weight bearing bilateral LEs  Therapy Prognosis: Good      Post-Pain Assessment:       Pain Rating (0-10 pain scale):   \"sore\"/10   Location and pain description same as pre-treatment unless indicated.    Action: [] NA   [x] Perform HEP  [] Meds as prescribed  [x] Modalities as prescribed   [] Call Physician     GOALS   Patient Goal(s): Patient goals : \"ambulation without assistive device\"    Short Term Goals Completed by 2-4 weeks Goal Status   STG 1 The patient will demonstrate improved postural awareness/equal weight bearing requiring <25% verbal cueing during functional mobility tasks & exercises In progress   STG 2 The patient will self-report ability to stand & ambulation >10-15 minutes consistently without increased left knee pain in order to demonstrate improved ability to perform functional mobility tasks In progress   STG 3 The patient will ascend/descend 5 stairs consistently with reciprocal stair pattern with unilateral rail without increased left knee pain in order to demonstrate improved ability to get in/out of friend's houses In progress     Long Term Goals Completed by 4-6 weeks Goal Status   LTG 1 The patient will demonstrate competency with HEP to progress towards self management of symptoms upon D/C In progress   LTG 2 The patient will report decreased left knee pain </=1/10 consistently in order to improve ability to perform functional mobility tasks In progress   LTG 3 The patient will improve pain free left knee AROM >/= -5* to 115* in order to increase ability to perform functional mobility tasks efficiently. In progress   LTG 4 The patient will demonstrate improved L LE strength >/= 75% of R LE strength in order to perform functional mobility tasks with increased ease. In progress   LTG 5 The patient will have an increase in LEFS score >/=9 points in order to increase ability to perform functional mobility tasks & ADLs.  In progress       Plan:  Frequency/Duration:  Plan  Plan Frequency: 2-3xs/wk  Plan weeks: 6-8 weeks  Current Treatment Recommendations: Strengthening, ROM, Balance training, Functional mobility training, Endurance training, Neuromuscular re-education, Stair training, Gait training, Manual Therapy - Soft Tissue Mobilization, Manual Therapy - Joint Manipulation, Pain management, Home exercise program, Safety education & training, Patient/Caregiver education & training, Modalities, Positioning, Aquatics, Therapeutic activities  Pt to continue current HEP. See objective section for any therapeutic exercise changes, additions or modifications this date.     Therapy Time:      PT Individual Minutes  Time In: 6899  Time Out: 1556  Minutes: 38  Timed Code Treatment Minutes: 38 Minutes  Procedure Minutes: 0 minutes  Timed Activity Minutes Units   Ther Ex 23 2   Manual  15 1     Electronically signed by Mainor Durán PT on 9/9/22 at 3:59 PM EDT

## 2022-09-20 ENCOUNTER — HOSPITAL ENCOUNTER (OUTPATIENT)
Dept: PHYSICAL THERAPY | Age: 77
Setting detail: THERAPIES SERIES
Discharge: HOME | End: 2022-09-20
Payer: MEDICARE

## 2022-09-22 ENCOUNTER — HOSPITAL ENCOUNTER (OUTPATIENT)
Dept: PHYSICAL THERAPY | Age: 77
Setting detail: THERAPIES SERIES
Discharge: HOME | End: 2022-09-22
Payer: MEDICARE

## 2022-09-22 PROCEDURE — 97110 THERAPEUTIC EXERCISES: CPT

## 2022-09-22 ASSESSMENT — PAIN SCALES - GENERAL: PAINLEVEL_OUTOF10: 2

## 2022-09-22 ASSESSMENT — PAIN DESCRIPTION - ONSET: ONSET: ON-GOING

## 2022-09-22 ASSESSMENT — PAIN DESCRIPTION - DESCRIPTORS: DESCRIPTORS: SORE

## 2022-09-22 ASSESSMENT — PAIN DESCRIPTION - PAIN TYPE: TYPE: SURGICAL PAIN

## 2022-09-22 ASSESSMENT — PAIN DESCRIPTION - FREQUENCY: FREQUENCY: INTERMITTENT

## 2022-09-22 ASSESSMENT — PAIN DESCRIPTION - LOCATION: LOCATION: KNEE

## 2022-09-22 ASSESSMENT — PAIN DESCRIPTION - ORIENTATION: ORIENTATION: LEFT

## 2022-09-22 NOTE — PROGRESS NOTES
[address] [facility], [place] [address]     Ph: [phone]  Fax: [phone]      [] Certification  [] Recertification []  Plan of Care  [x] Progress Note [] Discharge      Referring Provider: Gisele Ibarra PA-C     From:  Med Hernandez, PT, DPT  Patient: Denise Soto (77 y.o. female) : 1945 Date: 2022  Medical Diagnosis: Unilateral primary osteoarthritis, left knee [M17.12]       Treatment Diagnosis: increased left knee pain, decreased pain free left knee ROM, decreased left LE strength & endurance & balance, decreased ability to perform functional mobility tasks, & impaired postural awareness/equal weight bearing bilateral LEs    Plan of Care/Certification Expiration Date: : 22   Progress Report Period from:  2022  to 2022    Visits to Date: 9 No Show: 0 Cancelled Appts: 1    OBJECTIVE:   Short Term Goals - Time Frame for Short term goals: 2-4 weeks    Goals Current/Discharge status  Status   Short term goal 1: The patient will demonstrate improved postural awareness/equal weight bearing requiring <25% verbal cueing during functional mobility tasks & exercises  Improved In progress, Partially met   Short term goal 2: The patient will self-report ability to stand & ambulation >10-15 minutes consistently without increased left knee pain in order to demonstrate improved ability to perform functional mobility tasks  Self-reported >10-15 minutes without increased left knee pain Met   Short term goal 3: The patient will ascend/descend 5 stairs consistently with reciprocal stair pattern with unilateral rail without increased left knee pain in order to demonstrate improved ability to get in/out of friend's houses  Intermittently performs stairs with reciprocal stair pattern & non-reciprocal stair pattern In progress, Partially met     Long Term Goals - Time Frame for Long term goals : 4-6 weeks  Goals Current/ Discharge status Status   Long term goal 1: The patient will demonstrate competency with HEP to progress towards self management of symptoms upon D/C On-going, independent/compliant with current In progress, Partially met   Long term goal 2: The patient will report decreased left knee pain </=1/10 consistently in order to improve ability to perform functional mobility tasks Pain Screening  Patient Currently in Pain: Yes  Pain Assessment: 0-10  Pain Level: 2  Pain Type: Surgical pain  Pain Location: Knee  Pain Orientation: Left  Pain Descriptors: Sore  Pain Frequency: Intermittent  Pain Onset: On-going In progress, Partially met   Long term goal 3: The patient will improve pain free left knee AROM >/= -5* to 115* in order to increase ability to perform functional mobility tasks efficiently. AROM LLE (degrees)  LLE General AROM: knee extension = lacking 10*; knee flexion = 112*  In progress, Partially met   Long term goal 4: The patient will demonstrate improved L LE strength >/= 75% of R LE strength in order to perform functional mobility tasks with increased ease. Strength LLE  Comment: hip flexion = 4/5; hip abduction = 4/5; hip adduction = 4/5; knee extension = 4+/5; knee flexion = 4+/5  Strength RLE  Comment: hip flexion = 4/5; hip abduction = 4/5; hip adduction = 4/5; knee extension = 5/5; knee flexion = 5/5  In progress   Long term goal 5: The patient will have an increase in LEFS score >/=9 points in order to increase ability to perform functional mobility tasks & ADLs.  Exam: LEFS = 51/80 (+6 point improvement since evaluation)  In progress     Body Structures, Functions, Activity Limitations Requiring Skilled Therapeutic Intervention: Decreased functional mobility , Decreased ROM, Decreased strength, Decreased posture, Increased pain, Decreased balance, Decreased endurance  Assessment: Patient is a 68year old female who presented on 8/17/22 s/p L TKA by Dr. Aracelis Mcdonald on 7/14/22 who has participated in 9 outpatient PT sessions 8/17/22-9/22/22. Patient has demonstrated progress towards all goals established upon initial evaluation. Patient cleared to start aquatics therapy in addition to current land therapy which will be initiated at next session. Patient reports ~85-90% improvement in function. Patient would continue to benefit from outpatient PT services in order to continue to address these impairments as well as improve patient's QOL & ease with ADLs. Therapy Prognosis: Good      PLAN: [x] Evaluate and Treat  Frequency/Duration:  Plan Frequency: 2-3xs/wk  Plan weeks: 6-8 weeks  Current Treatment Recommendations: Strengthening, ROM, Balance training, Functional mobility training, Endurance training, Neuromuscular re-education, Stair training, Gait training, Manual Therapy - Soft Tissue Mobilization, Manual Therapy - Joint Manipulation, Pain management, Home exercise program, Safety education & training, Patient/Caregiver education & training, Modalities, Positioning, Aquatics, Therapeutic activities  Plan Comment: Plan to continue with 1-2xs/wk for 2-4 additional weeks (alternating between aquatics & land)     Precautions:       arthritis, psych/emotional issues (depression), high blood pressure, peripheral neuropathy, hearing loss                        Patient Status:[x] Continue/ Initiate plan of Care    [] Discharge PT. Recommend pt continue with HEP. [x] Additional visits requested, Please re-certify for additional visits: plan to continue 2-4 more weeks, 1-2xs/wk    [] Hold         Signature: Electronically signed by Anthony Thomas PT on 9/22/22 at 12:09 PM EDT      If you have any questions or concerns, please don't hesitate to call. Thank you for your referral.    I have reviewed this plan of care and certify a need for medically necessary rehabilitation services.     Physician Signature:__________________________________________________________  Date:  Please sign and return

## 2022-09-22 NOTE — PROGRESS NOTES
[facility]  Outpatient Physical Therapy    Treatment Note        Date: 2022  Patient: Bowen Trivedi  : 1945   Confirmed: Yes  MRN: [id number]  Referring Provider: Doroteo Lopez PA-C    Medical Diagnosis: Unilateral primary osteoarthritis, left knee [M17.12]       Treatment Diagnosis: increased left knee pain, decreased pain free left knee ROM, decreased left LE strength & endurance & balance, decreased ability to perform functional mobility tasks, & impaired postural awareness/equal weight bearing bilateral LEs    Visit Information:  Insurance: Payor: Angelica Baker / Plan: MEDICARE PART A AND B / Product Type: *No Product type* /   PT Visit Information  Onset Date: 22  PT Insurance Information: Medicare  Total # of Visits Approved: 99  Total # of Visits to Date: 8  Plan of Care/Certification Expiration Date: 22  No Show: 0  Progress Note Due Date: 10/22/22  Canceled Appointment: 1  Progress Note Counter:  (30 day PN due 10/22/22)    Subjective Information:  Subjective: Patient reports intermittent compliance with HEP while on vacation.   HEP Compliance:  [x] Good [] Fair [] Poor [] Reports not doing due to:    Pain Screening  Patient Currently in Pain: Yes  Pain Assessment: 0-10  Pain Level: 2  Pain Type: Surgical pain  Pain Location: Knee  Pain Orientation: Left  Pain Descriptors: Sore  Pain Frequency: Intermittent  Pain Onset: On-going    Treatment:  Exercises:  Exercises  Exercise 1: SLR with QS 5 sec x 15 reps x 2 sets, supine  Exercise 3: supine heel slides with strap, LLE 5 sec x 15 reps x 1 set  Exercise 8: Scifit L3.0 x 6 min to improve LE strength and ROM, LEs only  Exercise 18: objective measurements  Exercise 20: HEP: continue with current     Modalities:  Cryotherapy (CPT 54864)  Number Minutes Cryotherapy: 10  Cryotherapy location: Left, Knee  Post treatment skin assessment: Intact       *Indicates exercise, modality, or manual techniques to be initiated when appropriate    Objective Measures:     Ambulation  Surface: carpet  Device: Single point cane (intermittently - more for longer distances)  Other Apparatus: Right  Assistance: Modified Independent  Quality of Gait: slow therese, decreased weight bearing LLE - better than before though  Gait Deviations: Increased MANISHA, Slow Therese, Decreased step length, Decreased step height  Distance: clinic distances         Stairs  # Steps : 2  Stairs Height: 6\"  Rails: Bilateral  Device: No Device  Assistance: Contact guard assistance  Comment: non-reciprocal stair pattern - education/training provided for proper technique & sequencing    Strength: [] NT  [x] MMT completed:  Strength RLE  Comment: hip flexion = 4/5; hip abduction = 4/5; hip adduction = 4/5; knee extension = 5/5; knee flexion = 5/5  Strength LLE  Comment: hip flexion = 4/5; hip abduction = 4/5; hip adduction = 4/5; knee extension = 4+/5; knee flexion = 4+/5    ROM: [] NT  [x] ROM measurements:     AROM LLE (degrees)  LLE General AROM: knee extension = lacking 10*; knee flexion = 112*     Assessment: Body Structures, Functions, Activity Limitations Requiring Skilled Therapeutic Intervention: Decreased functional mobility , Decreased ROM, Decreased strength, Decreased posture, Increased pain, Decreased balance, Decreased endurance  Assessment: Patient is a 68year old female who presented on 8/17/22 s/p L TKA by Dr. Niko Arshad on 7/14/22 who has participated in 9 outpatient PT sessions 8/17/22-9/22/22. Patient has demonstrated progress towards all goals established upon initial evaluation. Patient cleared to start aquatics therapy in addition to current land therapy which will be initiated at next session. Patient reports ~85-90% improvement in function. Patient would continue to benefit from outpatient PT services in order to continue to address these impairments as well as improve patient's QOL & ease with ADLs.   Treatment Diagnosis: increased left knee pain, with increased ease. In progress   LTG 5 The patient will have an increase in LEFS score >/=9 points in order to increase ability to perform functional mobility tasks & ADLs. In progress       Plan:  Frequency/Duration:  Plan  Plan Frequency: 2-3xs/wk  Plan weeks: 6-8 weeks  Current Treatment Recommendations: Strengthening, ROM, Balance training, Functional mobility training, Endurance training, Neuromuscular re-education, Stair training, Gait training, Manual Therapy - Soft Tissue Mobilization, Manual Therapy - Joint Manipulation, Pain management, Home exercise program, Safety education & training, Patient/Caregiver education & training, Modalities, Positioning, Aquatics, Therapeutic activities  Plan Comment: Plan to continue with 1-2xs/wk for 2-4 additional weeks (alternating between aquatics & land)  Pt to continue current HEP. See objective section for any therapeutic exercise changes, additions or modifications this date.     Therapy Time:      PT Individual Minutes  Time In: 7311  Time Out: 1128  Minutes: 48  Timed Code Treatment Minutes: 38 Minutes  Procedure Minutes:10 minutes CP  Timed Activity Minutes Units   Ther Ex 38 3   Electronically signed by Dandy Romero PT on 9/22/22 at 12:07 PM EDT

## 2022-09-27 ENCOUNTER — HOSPITAL ENCOUNTER (OUTPATIENT)
Dept: PHYSICAL THERAPY | Age: 77
Setting detail: THERAPIES SERIES
Discharge: HOME | End: 2022-09-27
Payer: MEDICARE

## 2022-09-27 PROCEDURE — 97113 AQUATIC THERAPY/EXERCISES: CPT

## 2022-09-27 NOTE — PROGRESS NOTES
Strength: [x] NT  [] MMT completed:    ROM: [x] NT  [] ROM measurements:    Assessment: Body Structures, Functions, Activity Limitations Requiring Skilled Therapeutic Intervention: Decreased functional mobility , Decreased ROM, Decreased strength, Decreased posture, Increased pain, Decreased balance, Decreased endurance  Assessment: Initiated patient's first aquatics session s/p L TKA this date with focus on L LE strengthening & endurance as well as L LE flexibility. No pain reported throughout session. Treatment Diagnosis: increased left knee pain, decreased pain free left knee ROM, decreased left LE strength & endurance & balance, decreased ability to perform functional mobility tasks, & impaired postural awareness/equal weight bearing bilateral LEs  Therapy Prognosis: Good     Post-Pain Assessment:       Pain Rating (0-10 pain scale):   0/10   Location and pain description same as pre-treatment unless indicated.    Action: [] NA   [x] Perform HEP  [] Meds as prescribed  [x] Modalities as prescribed   [] Call Physician     GOALS   Patient Goal(s): Patient goals : \"ambulation without assistive device\"    Short Term Goals Completed by 2-4 weeks Goal Status   STG 1 The patient will demonstrate improved postural awareness/equal weight bearing requiring <25% verbal cueing during functional mobility tasks & exercises In progress, Partially met   STG 2 The patient will self-report ability to stand & ambulation >10-15 minutes consistently without increased left knee pain in order to demonstrate improved ability to perform functional mobility tasks Met   STG 3 The patient will ascend/descend 5 stairs consistently with reciprocal stair pattern with unilateral rail without increased left knee pain in order to demonstrate improved ability to get in/out of friend's houses In progress, Partially met     Long Term Goals Completed by 4-6 weeks Goal Status   LTG 1 The patient will demonstrate competency with HEP to progress towards self management of symptoms upon D/C In progress, Partially met   LTG 2 The patient will report decreased left knee pain </=1/10 consistently in order to improve ability to perform functional mobility tasks In progress, Partially met   LTG 3 The patient will improve pain free left knee AROM >/= -5* to 115* in order to increase ability to perform functional mobility tasks efficiently. In progress, Partially met   LTG 4 The patient will demonstrate improved L LE strength >/= 75% of R LE strength in order to perform functional mobility tasks with increased ease. In progress   LTG 5 The patient will have an increase in LEFS score >/=9 points in order to increase ability to perform functional mobility tasks & ADLs. In progress       Plan:  Frequency/Duration:  Plan  Plan Frequency: 2-3xs/wk  Plan weeks: 6-8 weeks  Current Treatment Recommendations: Strengthening, ROM, Balance training, Functional mobility training, Endurance training, Neuromuscular re-education, Stair training, Gait training, Manual Therapy - Soft Tissue Mobilization, Manual Therapy - Joint Manipulation, Pain management, Home exercise program, Safety education & training, Patient/Caregiver education & training, Modalities, Positioning, Aquatics, Therapeutic activities  Plan Comment: Plan to continue with 1-2xs/wk for 2-4 additional weeks (alternating between aquatics & land)  Pt to continue current HEP. See objective section for any therapeutic exercise changes, additions or modifications this date.     Therapy Time:      PT Individual Minutes  Time In: 2354  Time Out: 8930  Minutes: 38  Timed Code Treatment Minutes: 38 Minutes  Procedure Minutes: 0 minutes  Timed Activity Minutes Units   Aquatics 38 3   Electronically signed by Randa Antoine PT on 9/27/22 at 2:55 PM EDT

## 2022-09-29 ENCOUNTER — HOSPITAL ENCOUNTER (OUTPATIENT)
Dept: PHYSICAL THERAPY | Age: 77
Setting detail: THERAPIES SERIES
Discharge: HOME | End: 2022-09-29
Payer: MEDICARE

## 2022-09-29 PROCEDURE — 97110 THERAPEUTIC EXERCISES: CPT

## 2022-09-29 NOTE — PROGRESS NOTES
[place]  Outpatient Physical Therapy    Treatment Note        Date: 2022  Patient: Bassam Tilley  : 1945   Confirmed: Yes  MRN: [id number]  Referring Provider: Ryanne Valadez PA-C    Medical Diagnosis: Unilateral primary osteoarthritis, left knee [M17.12]       Treatment Diagnosis: increased left knee pain, decreased pain free left knee ROM, decreased left LE strength & endurance & balance, decreased ability to perform functional mobility tasks, & impaired postural awareness/equal weight bearing bilateral LEs    Visit Information:  Insurance: Payor: [employer] / Plan: MEDICARE PART A AND B / Product Type: *No Product type* /   PT Visit Information  Onset Date: 22  PT Insurance Information: Medicare  Total # of Visits Approved: 99  Total # of Visits to Date:   Plan of Care/Certification Expiration Date: 22  No Show: 0  Progress Note Due Date: 10/22/22  Canceled Appointment: 1  Progress Note Counter:  (30 day PN due 10/22/22)    Subjective Information:  Subjective: Patient reports feeling \"sore\" earlier in the week, however, patient has no pain today. Patient continues to report the only challenge is stairs.   HEP Compliance:  [x] Good [] Fair [] Poor [] Reports not doing due to:    Pain Screening  Patient Currently in Pain: Denies    Treatment:  Exercises:  Exercises  Exercise 3: supine heel slides with strap, LLE 5 sec x 15 reps x 1 set  Exercise 4: Step-ups: 6\" x 15 reps x 2 sets (LLE first ascending 2 sets & RLE first ascending 2 sets only 10 reps with RLE) (unilateral UE support)  Exercise 5: side-lying clams & reverse clams, bilaterally, 2 sets x 12 reps each exercise each LE  Exercise 7: p-ball DKTC, supine, 5 minutes  Exercise 8: Scifit L3.5 x 5 min to improve LE strength and ROM, LEs only  Exercise 18: objective measurements  Exercise 20: HEP: continue with current + heel tap downs     Modalities:  Cryotherapy (CPT 71005)  Patient Position: Supine  Number Minutes Cryotherapy: 10  Cryotherapy location: Left, Knee  Post treatment skin assessment: Intact       *Indicates exercise, modality, or manual techniques to be initiated when appropriate    Objective Measures:       Strength: [x] NT  [] MMT completed:    ROM: [] NT  [x] ROM measurements:     AROM LLE (degrees)  LLE General AROM: knee extension = lacking 9*; knee flexion = 112*     Assessment: Body Structures, Functions, Activity Limitations Requiring Skilled Therapeutic Intervention: Decreased functional mobility , Decreased ROM, Decreased strength, Decreased posture, Increased pain, Decreased balance, Decreased endurance  Assessment: Continued patient's treatment session focus on step related activities as well as strengthening of bilateral LE musculature to encourage smoother step navigation. No increase in pain just soreness post-treatment session. Slight improvement in left knee extension this date. Education on heel to toe walking especially on LLE. Treatment Diagnosis: increased left knee pain, decreased pain free left knee ROM, decreased left LE strength & endurance & balance, decreased ability to perform functional mobility tasks, & impaired postural awareness/equal weight bearing bilateral LEs  Therapy Prognosis: Good    Post-Pain Assessment:       Pain Rating (0-10 pain scale):   0/10   Location and pain description same as pre-treatment unless indicated.    Action: [] NA   [x] Perform HEP  [] Meds as prescribed  [x] Modalities as prescribed   [] Call Physician     GOALS   Patient Goal(s): Patient goals : \"ambulation without assistive device\"    Short Term Goals Completed by 2-4 weeks Goal Status   STG 1 The patient will demonstrate improved postural awareness/equal weight bearing requiring <25% verbal cueing during functional mobility tasks & exercises In progress, Partially met   STG 2 The patient will self-report ability to stand & ambulation >10-15 minutes consistently without increased left knee pain in order to demonstrate improved ability to perform functional mobility tasks Met   STG 3 The patient will ascend/descend 5 stairs consistently with reciprocal stair pattern with unilateral rail without increased left knee pain in order to demonstrate improved ability to get in/out of friend's houses In progress, Partially met     Long Term Goals Completed by 4-6 weeks Goal Status   LTG 1 The patient will demonstrate competency with HEP to progress towards self management of symptoms upon D/C In progress, Partially met   LTG 2 The patient will report decreased left knee pain </=1/10 consistently in order to improve ability to perform functional mobility tasks In progress, Partially met   LTG 3 The patient will improve pain free left knee AROM >/= -5* to 115* in order to increase ability to perform functional mobility tasks efficiently. In progress, Partially met   LTG 4 The patient will demonstrate improved L LE strength >/= 75% of R LE strength in order to perform functional mobility tasks with increased ease. In progress   LTG 5 The patient will have an increase in LEFS score >/=9 points in order to increase ability to perform functional mobility tasks & ADLs. In progress       Plan:  Frequency/Duration:  Plan  Plan Frequency: 2-3xs/wk  Plan weeks: 6-8 weeks  Current Treatment Recommendations: Strengthening, ROM, Balance training, Functional mobility training, Endurance training, Neuromuscular re-education, Stair training, Gait training, Manual Therapy - Soft Tissue Mobilization, Manual Therapy - Joint Manipulation, Pain management, Home exercise program, Safety education & training, Patient/Caregiver education & training, Modalities, Positioning, Aquatics, Therapeutic activities  Plan Comment: Plan to continue with 1-2xs/wk for 2-4 additional weeks (alternating between aquatics & land)  Pt to continue current HEP.   See objective section for any therapeutic exercise changes, additions or modifications

## 2022-10-04 ENCOUNTER — HOSPITAL ENCOUNTER (OUTPATIENT)
Dept: PHYSICAL THERAPY | Age: 77
Setting detail: THERAPIES SERIES
Discharge: HOME | End: 2022-10-04
Payer: MEDICARE

## 2022-10-04 PROCEDURE — 97113 AQUATIC THERAPY/EXERCISES: CPT

## 2022-10-04 NOTE — PROGRESS NOTES
[facility]  Outpatient Physical Therapy    Treatment Note        Date: 10/4/2022  Patient: Cory Urbina  : 1945   Confirmed: Yes  MRN: [id number]  Referring Provider: Malena Wade PA-C    Medical Diagnosis: Unilateral primary osteoarthritis, left knee [M17.12]       Treatment Diagnosis: increased left knee pain, decreased pain free left knee ROM, decreased left LE strength & endurance & balance, decreased ability to perform functional mobility tasks, & impaired postural awareness/equal weight bearing bilateral LEs    Visit Information:  Insurance: Payor: [address] [place] [address] / Plan: MEDICARE PART A AND B / Product Type: *No Product type* /   PT Visit Information  Onset Date: 22  PT Insurance Information: Medicare  Total # of Visits Approved: 99  Total # of Visits to Date: 12  Plan of Care/Certification Expiration Date: 22  No Show: 0  Progress Note Due Date: 10/22/22  Canceled Appointment: 1  Progress Note Counter:  (30 day PN due 10/22/22)    Subjective Information:  Subjective: Patient denies pain this date. Reports she \"won at cards\" since last visit.   HEP Compliance:  [x] Good [] Fair [] Poor [] Reports not doing due to:    Pain Screening  Patient Currently in Pain: Denies    Treatment:  Exercises:  Exercises  Exercise 10: AQUATICS*  Exercise 11: ambulation 3-way, 4 laps, forward/side step/retro/high knees  Exercise 13: hamstring stretch, on 3rd step, 1 set x 3 reps x 20-30 second hold each LE  Exercise 14: hip flexor/quad stretch, on 2nd step, 1 set x 3 reps x 20-30 second hold each LE  Exercise 16: calf raise/calf stretch on bottom step, 5 second hold each motion, bilateral UE support, 2 sets x 15 reps  Exercise 17: ITB stretch against wall, 1 set x 3 reps x 30 second hold each LE, unilateral UE support  Exercise 20: HEP: continue with current      *Indicates exercise, modality, or manual techniques to be initiated when appropriate    Objective Measures:       Strength: [x] NT  [] MMT completed:      ROM: [x] NT  [] ROM measurements:    Assessment: Body Structures, Functions, Activity Limitations Requiring Skilled Therapeutic Intervention: Decreased functional mobility , Decreased ROM, Decreased strength, Decreased posture, Increased pain, Decreased balance, Decreased endurance  Assessment: Continued with patient's aquatics PT POC this date with focus on L LE strengthening & endurance as well as L LE flexibility through addition of calf raises/stretches, ITB stretch, & increased reps & sets of exercises/stretches. No pain reported throughout session. Treatment Diagnosis: increased left knee pain, decreased pain free left knee ROM, decreased left LE strength & endurance & balance, decreased ability to perform functional mobility tasks, & impaired postural awareness/equal weight bearing bilateral LEs  Therapy Prognosis: Good    Post-Pain Assessment:       Pain Rating (0-10 pain scale):   0/10   Location and pain description same as pre-treatment unless indicated.    Action: [] NA   [x] Perform HEP  [] Meds as prescribed  [x] Modalities as prescribed   [] Call Physician     GOALS   Patient Goal(s): Patient Goals : \"ambulation without assistive device\"    Short Term Goals Completed by 2-4 weeks Goal Status   STG 1 The patient will demonstrate improved postural awareness/equal weight bearing requiring <25% verbal cueing during functional mobility tasks & exercises In progress, Partially met   STG 2 The patient will self-report ability to stand & ambulation >10-15 minutes consistently without increased left knee pain in order to demonstrate improved ability to perform functional mobility tasks Met   STG 3 The patient will ascend/descend 5 stairs consistently with reciprocal stair pattern with unilateral rail without increased left knee pain in order to demonstrate improved ability to get in/out of friend's houses In progress, Partially met     Long Term Goals Completed by 4-6 weeks Goal Status   LTG 1 The patient will demonstrate competency with HEP to progress towards self management of symptoms upon D/C In progress, Partially met   LTG 2 The patient will report decreased left knee pain </=1/10 consistently in order to improve ability to perform functional mobility tasks In progress, Partially met   LTG 3 The patient will improve pain free left knee AROM >/= -5* to 115* in order to increase ability to perform functional mobility tasks efficiently. In progress, Partially met   LTG 4 The patient will demonstrate improved L LE strength >/= 75% of R LE strength in order to perform functional mobility tasks with increased ease. In progress   LTG 5 The patient will have an increase in LEFS score >/=9 points in order to increase ability to perform functional mobility tasks & ADLs. In progress       Plan:  Frequency/Duration:  Plan  Plan Frequency: 2-3xs/wk  Plan weeks: 6-8 weeks  Current Treatment Recommendations: Strengthening, ROM, Balance training, Functional mobility training, Endurance training, Neuromuscular re-education, Stair training, Gait training, Manual Therapy - Soft Tissue Mobilization, Manual Therapy - Joint Manipulation, Pain management, Home exercise program, Safety education & training, Patient/Caregiver education & training, Modalities, Positioning, Aquatics, Therapeutic activities  Additional Comments: Plan to continue with 1-2xs/wk for 2-4 additional weeks (alternating between aquatics & land)  Pt to continue current HEP. See objective section for any therapeutic exercise changes, additions or modifications this date.     Therapy Time:      PT Individual Minutes  Time In: 0259  Time Out: 1115  Minutes: 40  Timed Code Treatment Minutes: 40 Minutes  Procedure Minutes: 0 minutes  Timed Activity Minutes Units   Aquatics 40 3   Electronically signed by Zabrina Hurtado PT on 10/4/22 at 11:10 AM EDT

## 2022-10-06 ENCOUNTER — APPOINTMENT (OUTPATIENT)
Dept: PHYSICAL THERAPY | Age: 77
End: 2022-10-06
Payer: MEDICARE

## 2022-10-10 ENCOUNTER — HOSPITAL ENCOUNTER (OUTPATIENT)
Dept: PHYSICAL THERAPY | Age: 77
Setting detail: THERAPIES SERIES
Discharge: HOME | End: 2022-10-10
Payer: MEDICARE

## 2022-10-10 PROCEDURE — 97110 THERAPEUTIC EXERCISES: CPT

## 2022-10-10 PROCEDURE — 97140 MANUAL THERAPY 1/> REGIONS: CPT

## 2022-10-10 ASSESSMENT — PAIN DESCRIPTION - ONSET: ONSET: ON-GOING

## 2022-10-10 ASSESSMENT — PAIN DESCRIPTION - ORIENTATION: ORIENTATION: LEFT

## 2022-10-10 ASSESSMENT — PAIN DESCRIPTION - PAIN TYPE: TYPE: SURGICAL PAIN

## 2022-10-10 ASSESSMENT — PAIN SCALES - GENERAL: PAINLEVEL_OUTOF10: 2

## 2022-10-10 ASSESSMENT — PAIN DESCRIPTION - LOCATION: LOCATION: KNEE

## 2022-10-10 ASSESSMENT — PAIN DESCRIPTION - DESCRIPTORS: DESCRIPTORS: SORE

## 2022-10-10 ASSESSMENT — PAIN DESCRIPTION - FREQUENCY: FREQUENCY: INTERMITTENT

## 2022-10-10 NOTE — PROGRESS NOTES
[facility]  Outpatient Physical Therapy    Treatment Note        Date: 10/10/2022  Patient: Bradley Forrest  : 1945   Confirmed: Yes  MRN: [id number]  Referring Provider: Antonio Sanchez PA-C    Medical Diagnosis: Unilateral primary osteoarthritis, left knee [M17.12]       Treatment Diagnosis: increased left knee pain, decreased pain free left knee ROM, decreased left LE strength & endurance & balance, decreased ability to perform functional mobility tasks, & impaired postural awareness/equal weight bearing bilateral LEs    Visit Information:  Insurance: Payor: [employer] / Plan: MEDICARE PART A AND B / Product Type: *No Product type* /   PT Visit Information  Onset Date: 22  PT Insurance Information: Medicare  Total # of Visits Approved: 99  Total # of Visits to Date:   Plan of Care/Certification Expiration Date: 22  No Show: 0  Progress Note Due Date: 10/22/22  Canceled Appointment: 1  Progress Note Counter:  (30 day PN due 10/22/22)    Subjective Information:  Subjective: Patient reports \"over-doing it\" this weekend while playing with great grandchildren.   HEP Compliance:  [] Good [x] Fair [] Poor [x] Reports not doing due to: busy this weekend    Pain Screening  Patient Currently in Pain: Yes  Pain Assessment: 0-10  Pain Level: 2  Pain Type: Surgical pain  Pain Location: Knee  Pain Orientation: Left  Pain Descriptors: Sore  Pain Frequency: Intermittent  Pain Onset: On-going    Treatment:  Exercises:  Exercises  Exercise 1: QS 5 sec x 15 reps x 2 sets, supine  Exercise 3: supine heel slides with strap, LLE 5 sec x 15 reps x 2 sets  Exercise 6: passive knee hang with 3# ankle weight, 3 minutes, prone on mat with bilateral LEs over edge  Exercise 8: Scifit L4.0 x 5 min to improve LE strength and ROM, LEs only  Exercise 9: hamstring stretch & quad/hip flexor on 2nd step, 1 set x 3 reps x 20-30 second hold each stretch LLE  Exercise 18: objective measurements  Exercise 20: HEP: continue with current       Manual:   Manual Therapy  Joint Mobilization: left knee, extension, with distractional techniques & OP  Other: KT taping* cupping* x8 minute total manual       Modalities:  Cryotherapy (CPT 63430)  Patient Position: Supine  Number Minutes Cryotherapy: 10  Cryotherapy location: Left, Knee  Post treatment skin assessment: Intact       *Indicates exercise, modality, or manual techniques to be initiated when appropriate    Objective Measures:      Strength: [x] NT  [] MMT completed:    ROM: [] NT  [x] ROM measurements:     AROM LLE (degrees)  LLE General AROM: knee extension = lacking 10*; knee extension = lacking 8* post-manual; knee flexion = 115*     Assessment: Body Structures, Functions, Activity Limitations Requiring Skilled Therapeutic Intervention: Decreased functional mobility , Decreased ROM, Decreased strength, Decreased posture, Increased pain, Decreased balance, Decreased endurance  Assessment: Continued patient's treatment session focus on knee extension activities due to patient's lack of reported exercising/stretching over the weekend due to babysitting duties. No increase in pain just soreness post-treatment session. Slight improvement in left knee flexion this date with patient continuing to have main deficit in knee extension. Manual stretching & joint mobilization for improved right knee extension performed this date with slight improvement in knee extension post-manual.  Concluded with CP post-treatment for soreness. Treatment Diagnosis: increased left knee pain, decreased pain free left knee ROM, decreased left LE strength & endurance & balance, decreased ability to perform functional mobility tasks, & impaired postural awareness/equal weight bearing bilateral LEs  Therapy Prognosis: Good    Post-Pain Assessment:       Pain Rating (0-10 pain scale):   \"sore\"/10   Location and pain description same as pre-treatment unless indicated.    Action: [] NA   [x] Perform HEP  [] Meds as prescribed  [x] Modalities as prescribed   [] Call Physician     GOALS   Patient Goal(s): Patient Goals : \"ambulation without assistive device\"    Short Term Goals Completed by 2-4 weeks Goal Status   STG 1 The patient will demonstrate improved postural awareness/equal weight bearing requiring <25% verbal cueing during functional mobility tasks & exercises In progress, Partially met   STG 2 The patient will self-report ability to stand & ambulation >10-15 minutes consistently without increased left knee pain in order to demonstrate improved ability to perform functional mobility tasks Met   STG 3 The patient will ascend/descend 5 stairs consistently with reciprocal stair pattern with unilateral rail without increased left knee pain in order to demonstrate improved ability to get in/out of friend's houses In progress, Partially met     Long Term Goals Completed by 4-6 weeks Goal Status   LTG 1 The patient will demonstrate competency with HEP to progress towards self management of symptoms upon D/C In progress, Partially met   LTG 2 The patient will report decreased left knee pain </=1/10 consistently in order to improve ability to perform functional mobility tasks In progress, Partially met   LTG 3 The patient will improve pain free left knee AROM >/= -5* to 115* in order to increase ability to perform functional mobility tasks efficiently. In progress, Partially met   LTG 4 The patient will demonstrate improved L LE strength >/= 75% of R LE strength in order to perform functional mobility tasks with increased ease. In progress   LTG 5 The patient will have an increase in LEFS score >/=9 points in order to increase ability to perform functional mobility tasks & ADLs.  In progress       Plan:  Frequency/Duration:  Plan  Plan Frequency: 2-3xs/wk  Plan weeks: 6-8 weeks  Current Treatment Recommendations: Strengthening, ROM, Balance training, Functional mobility training, Endurance training, Neuromuscular re-education, Stair training, Gait training, Manual Therapy - Soft Tissue Mobilization, Manual Therapy - Joint Manipulation, Pain management, Home exercise program, Safety education & training, Patient/Caregiver education & training, Modalities, Positioning, Aquatics, Therapeutic activities  Additional Comments: Plan to continue with 1-2xs/wk for 2-4 additional weeks (alternating between aquatics & land)  Pt to continue current HEP. See objective section for any therapeutic exercise changes, additions or modifications this date.     Therapy Time:      PT Individual Minutes  Time In: 0915  Time Out: 1005  Minutes: 50  Timed Code Treatment Minutes: 40 Minutes  Procedure Minutes: 10 minute CP  Timed Activity Minutes Units   Ther Ex 32 2   Manual  8 1     Electronically signed by Abhi Flanagan PT on 10/10/22 at 10:08 AM EDT

## 2022-10-11 ENCOUNTER — HOSPITAL ENCOUNTER (OUTPATIENT)
Dept: PHYSICAL THERAPY | Age: 77
Setting detail: THERAPIES SERIES
Discharge: HOME | End: 2022-10-11
Payer: MEDICARE

## 2022-10-11 PROCEDURE — 97113 AQUATIC THERAPY/EXERCISES: CPT

## 2022-10-11 ASSESSMENT — PAIN DESCRIPTION - PAIN TYPE: TYPE: SURGICAL PAIN

## 2022-10-11 ASSESSMENT — PAIN SCALES - GENERAL: PAINLEVEL_OUTOF10: 2

## 2022-10-11 ASSESSMENT — PAIN DESCRIPTION - ORIENTATION: ORIENTATION: LEFT

## 2022-10-11 ASSESSMENT — PAIN DESCRIPTION - DESCRIPTORS: DESCRIPTORS: SORE

## 2022-10-11 ASSESSMENT — PAIN DESCRIPTION - LOCATION: LOCATION: KNEE

## 2022-10-11 NOTE — PROGRESS NOTES
[facility]  Outpatient Physical Therapy    Treatment Note        Date: 10/11/2022  Patient: Pari Venegas  : 1945   Confirmed: Yes  MRN: [id number]  Referring Provider: Arti Willams PA-C    Medical Diagnosis: Unilateral primary osteoarthritis, left knee [M17.12]       Treatment Diagnosis: increased left knee pain, decreased pain free left knee ROM, decreased left LE strength & endurance & balance, decreased ability to perform functional mobility tasks, & impaired postural awareness/equal weight bearing bilateral LEs    Visit Information:  Insurance: Payor: Jm Salehw / Plan: MEDICARE PART A AND B / Product Type: *No Product type* /   PT Visit Information  Onset Date: 22  PT Insurance Information: Medicare  Total # of Visits Approved: 99  Total # of Visits to Date:   Plan of Care/Certification Expiration Date: 22  No Show: 0  Progress Note Due Date: 10/22/22  Canceled Appointment: 1  Progress Note Counter:  (30 day PN due 10/22/22)    Subjective Information:  Subjective: Patient reports she has beening doing well. Reports mild pain in knee.   HEP Compliance:  [x] Good [] Fair [] Poor [] Reports not doing due to:    Pain Screening  Patient Currently in Pain: Yes  Pain Assessment: 0-10  Pain Level: 2  Pain Type: Surgical pain  Pain Location: Knee  Pain Orientation: Left  Pain Descriptors: Sore    Treatment:  Exercises:  Exercises  Exercise 10: AQUATICS:  Exercise 11: ambulation 3-way, 4 laps, forward/side step/retro/high knees  Exercise 12: standing hip circles x 15, cw, ccw  Exercise 13: hamstring stretch, on 3rd step, 1 set x 3 reps x 20-30 second hold on left  Exercise 14: hip flexor/quad stretch, on 2nd step, 1 set x 3 reps x 20-30 second hold on left  Exercise 15: step-ups, 4 way, unilateral UE support, 1 set x 15 reps each direction  Exercise 16: calf raise/calf stretch on bottom step, 5 second hold each motion, bilateral UE support, 2 sets x 15 reps  Exercise 18: mini lunges x 15, bilateral forward, lateral  Exercise 19: mini squats x 15 (cues for glut dominant position); Deep end bicycle x 3 minutes with noodles  Exercise 20: HEP: continue with current     *Indicates exercise, modality, or manual techniques to be initiated when appropriate    Objective Measures:           Strength: [x] NT  [] MMT completed:        ROM: [x] NT  [] ROM measurements:             Assessment: Body Structures, Functions, Activity Limitations Requiring Skilled Therapeutic Intervention: Decreased functional mobility , Decreased ROM, Decreased strength, Decreased posture, Increased pain, Decreased balance, Decreased endurance  Assessment: Continued to progress strengthening bilateral LEs in pool. Patient reports fatigue post session, but denies any increase in pain in left knee. Patient needed cues to demonstrates proper squat, lunges, and stretches. Continue per POC. Treatment Diagnosis: increased left knee pain, decreased pain free left knee ROM, decreased left LE strength & endurance & balance, decreased ability to perform functional mobility tasks, & impaired postural awareness/equal weight bearing bilateral LEs  Therapy Prognosis: Good          Post-Pain Assessment:       Pain Rating (0-10 pain scale):   2/10   Location and pain description same as pre-treatment unless indicated.    Action: [] NA   [] Perform HEP  [x] Meds as prescribed  [x] Modalities as prescribed   [] Call Physician     GOALS   Patient Goal(s): Patient Goals : \"ambulation without assistive device\"    Short Term Goals Completed by 2-4 weeks Goal Status   STG 1 The patient will demonstrate improved postural awareness/equal weight bearing requiring <25% verbal cueing during functional mobility tasks & exercises In progress, Partially met   STG 2 The patient will self-report ability to stand & ambulation >10-15 minutes consistently without increased left knee pain in order to demonstrate improved ability to perform functional mobility tasks Met   STG 3 The patient will ascend/descend 5 stairs consistently with reciprocal stair pattern with unilateral rail without increased left knee pain in order to demonstrate improved ability to get in/out of friend's houses In progress, Partially met     Long Term Goals Completed by 4-6 weeks Goal Status   LTG 1 The patient will demonstrate competency with HEP to progress towards self management of symptoms upon D/C In progress, Partially met   LTG 2 The patient will report decreased left knee pain </=1/10 consistently in order to improve ability to perform functional mobility tasks In progress, Partially met   LTG 3 The patient will improve pain free left knee AROM >/= -5* to 115* in order to increase ability to perform functional mobility tasks efficiently. In progress, Partially met   LTG 4 The patient will demonstrate improved L LE strength >/= 75% of R LE strength in order to perform functional mobility tasks with increased ease. In progress   LTG 5 The patient will have an increase in LEFS score >/=9 points in order to increase ability to perform functional mobility tasks & ADLs. In progress       Plan:  Frequency/Duration:  Plan  Plan Frequency: 2-3xs/wk  Plan weeks: 6-8 weeks  Current Treatment Recommendations: Strengthening, ROM, Balance training, Functional mobility training, Endurance training, Neuromuscular re-education, Stair training, Gait training, Manual Therapy - Soft Tissue Mobilization, Manual Therapy - Joint Manipulation, Pain management, Home exercise program, Safety education & training, Patient/Caregiver education & training, Modalities, Positioning, Aquatics, Therapeutic activities  Additional Comments: Plan to continue with 1-2xs/wk for 2-4 additional weeks (alternating between aquatics & land)  Pt to continue current HEP. See objective section for any therapeutic exercise changes, additions or modifications this date.     Therapy Time:      PT Individual Minutes  Time In: 1040  Time Out: 2392  Minutes: 38  Timed Code Treatment Minutes: 38 Minutes  Procedure Minutes:0 minutes  Timed Activity Minutes Units   Aquatics 38 3   Manual        Electronically signed by Ngoc Henry PT on 10/11/22 at 1:41 PM EDT

## 2022-10-13 ENCOUNTER — HOSPITAL ENCOUNTER (OUTPATIENT)
Dept: PHYSICAL THERAPY | Age: 77
Setting detail: THERAPIES SERIES
Discharge: HOME | End: 2022-10-13
Payer: MEDICARE

## 2022-10-13 PROCEDURE — 97110 THERAPEUTIC EXERCISES: CPT

## 2022-10-13 NOTE — PROGRESS NOTES
[facility]  Outpatient Physical Therapy    Treatment Note        Date: 10/13/2022  Patient: Yovani Cooper  : 1945   Confirmed: Yes  MRN: [id number]  Referring Provider: Esme Li PA-C    Medical Diagnosis: Unilateral primary osteoarthritis, left knee [M17.12]       Treatment Diagnosis: increased left knee pain, decreased pain free left knee ROM, decreased left LE strength & endurance & balance, decreased ability to perform functional mobility tasks, & impaired postural awareness/equal weight bearing bilateral LEs    Visit Information:  Insurance: Payor: Caitlin Pepe / Plan: MEDICARE PART A AND B / Product Type: *No Product type* /   PT Visit Information  Onset Date: 22  PT Insurance Information: Medicare  Total # of Visits Approved: 99  Total # of Visits to Date: 15  Plan of Care/Certification Expiration Date: 22  No Show: 0  Progress Note Due Date: 10/22/22  Canceled Appointment: 1  Progress Note Counter: 15/4-20 (30 day PN due 10/22/22)    Subjective Information:  Subjective: Patient reports trialing heat on knee which she reports helped with flexibility.   HEP Compliance:  [x] Good [] Fair [] Poor [] Reports not doing due to:    Pain Screening  Patient Currently in Pain: Denies    Treatment:  Exercises:  Exercises  Exercise 1: QS 5 sec x 15 reps x 2 sets, supine  Exercise 2: SAQ over bolster: 2 sets x 15 reps x 5 second hold, LLE  Exercise 6: passive knee hang with 3# ankle weight, 3 minutes, supine on mat with bilateral LEs over bolster  Exercise 7: TKE with RTB, 2 sets x 15 reps x 5 second hold, standing  Exercise 8: Scifit L4.5 x 6 min to improve LE strength and ROM, LEs only  Exercise 9: hamstring stretch & quad/hip flexor on 2nd step, 1 set x 3 reps x 20-30 second hold each stretch LLE  Exercise 18: objective measurements  Exercise 20: HEP: continue with current     Modalities:  Cryotherapy (CPT 12486)  Patient Position: Supine  Number Minutes Cryotherapy: 10  Cryotherapy location: Left, Knee  Post treatment skin assessment: Intact       *Indicates exercise, modality, or manual techniques to be initiated when appropriate    Objective Measures:      Strength: [x] NT  [] MMT completed:    ROM: [] NT  [x] ROM measurements:     AROM LLE (degrees)  LLE General AROM: knee extension = lacking 10*; knee flexion = 115*     Assessment: Body Structures, Functions, Activity Limitations Requiring Skilled Therapeutic Intervention: Decreased functional mobility , Decreased ROM, Decreased strength, Decreased posture, Increased pain, Decreased balance, Decreased endurance  Assessment: Continue to present with left knee extension limitation so session focused on quad strengthening exercises/activities. No increase in pain just soreness post-treatment session. Concluded with CP post-treatment for soreness. Plan to finalize patient's PT POC over next two visits next week. Treatment Diagnosis: increased left knee pain, decreased pain free left knee ROM, decreased left LE strength & endurance & balance, decreased ability to perform functional mobility tasks, & impaired postural awareness/equal weight bearing bilateral LEs  Therapy Prognosis: Good     Post-Pain Assessment:       Pain Rating (0-10 pain scale):   0/10   Location and pain description same as pre-treatment unless indicated.    Action: [] NA   [x] Perform HEP  [] Meds as prescribed  [x] Modalities as prescribed   [] Call Physician     GOALS   Patient Goal(s): Patient Goals : \"ambulation without assistive device\"    Short Term Goals Completed by 2-4 weeks Goal Status   STG 1 The patient will demonstrate improved postural awareness/equal weight bearing requiring <25% verbal cueing during functional mobility tasks & exercises In progress, Partially met   STG 2 The patient will self-report ability to stand & ambulation >10-15 minutes consistently without increased left knee pain in order to demonstrate improved ability to perform functional mobility tasks Met   STG 3 The patient will ascend/descend 5 stairs consistently with reciprocal stair pattern with unilateral rail without increased left knee pain in order to demonstrate improved ability to get in/out of friend's houses In progress, Partially met     Long Term Goals Completed by 4-6 weeks Goal Status   LTG 1 The patient will demonstrate competency with HEP to progress towards self management of symptoms upon D/C In progress, Partially met   LTG 2 The patient will report decreased left knee pain </=1/10 consistently in order to improve ability to perform functional mobility tasks In progress, Partially met   LTG 3 The patient will improve pain free left knee AROM >/= -5* to 115* in order to increase ability to perform functional mobility tasks efficiently. In progress, Partially met   LTG 4 The patient will demonstrate improved L LE strength >/= 75% of R LE strength in order to perform functional mobility tasks with increased ease. In progress   LTG 5 The patient will have an increase in LEFS score >/=9 points in order to increase ability to perform functional mobility tasks & ADLs. In progress       Plan:  Frequency/Duration:  Plan  Plan Frequency: 2-3xs/wk  Plan weeks: 6-8 weeks  Current Treatment Recommendations: Strengthening, ROM, Balance training, Functional mobility training, Endurance training, Neuromuscular re-education, Stair training, Gait training, Manual Therapy - Soft Tissue Mobilization, Manual Therapy - Joint Manipulation, Pain management, Home exercise program, Safety education & training, Patient/Caregiver education & training, Modalities, Positioning, Aquatics, Therapeutic activities  Additional Comments: Plan to continue with 1-2xs/wk for 2-4 additional weeks (alternating between aquatics & land)  Pt to continue current HEP. See objective section for any therapeutic exercise changes, additions or modifications this date.     Therapy Time:      PT Individual Minutes  Time In: 0913  Time Out: 1001  Minutes: 48  Timed Code Treatment Minutes: 38 Minutes  Procedure Minutes: 10 minutes CP  Timed Activity Minutes Units   Ther Ex 38 3   Electronically signed by Jasper Montana PT on 10/13/22 at 10:02 AM EDT

## 2022-10-18 ENCOUNTER — HOSPITAL ENCOUNTER (OUTPATIENT)
Dept: PHYSICAL THERAPY | Age: 77
Setting detail: THERAPIES SERIES
Discharge: HOME | End: 2022-10-18
Payer: MEDICARE

## 2022-10-18 PROCEDURE — 97110 THERAPEUTIC EXERCISES: CPT

## 2022-10-18 NOTE — PROGRESS NOTES
[facility]  Outpatient Physical Therapy    Treatment Note        Date: 10/18/2022  Patient: Susen Primrose  : 1945   Confirmed: Yes  MRN: [id number]  Referring Provider: Scott Villanueva PA-C    Medical Diagnosis: Unilateral primary osteoarthritis, left knee [M17.12]       Treatment Diagnosis: increased left knee pain, decreased pain free left knee ROM, decreased left LE strength & endurance & balance, decreased ability to perform functional mobility tasks, & impaired postural awareness/equal weight bearing bilateral LEs    Visit Information:  Insurance: Payor: Cydney Mcghee / Plan: MEDICARE PART A AND B / Product Type: *No Product type* /   PT Visit Information  Onset Date: 22  PT Insurance Information: Medicare  Total # of Visits Approved: 99  Total # of Visits to Date:   Plan of Care/Certification Expiration Date: 22  No Show: 0  Progress Note Due Date: 10/22/22  Canceled Appointment: 1  Progress Note Counter:  (30 day PN due 10/22/22)    Subjective Information:  Subjective: Patient reports continuing to focus on left knee extension at home.   HEP Compliance:  [x] Good [] Fair [] Poor [] Reports not doing due to:    Pain Screening  Patient Currently in Pain: Denies    Treatment:  Exercises:  Exercises  Exercise 5: supine hamstring stretch with strap, 1 set x 3 reps x 20-30 seconds, LLE  Exercise 8: Scifit L5.0 x 6 min to improve LE strength and ROM, LEs only  Exercise 16: ambulation training/assessment without AD, 150'+, independent, equal weight shifting  Exercise 17: TG, level 8, 2 sets x 15 reps x 5 second hold bilateral LEs @ same time  Exercise 18: objective measurements  Exercise 19: LAQ & hip flexion marches seated EOM, bilateral LEs, 2# ankle weights, 2 sets x 20 reps each LE each exercise  Exercise 20: HEP: continue with current    Modalities:  Cryotherapy (CPT 70077)  Patient Position: Supine  Number Minutes Cryotherapy: 10  Cryotherapy location: Left, Knee  Post treatment skin assessment: Intact       *Indicates exercise, modality, or manual techniques to be initiated when appropriate    Objective Measures:        Strength: [x] NT  [] MMT completed:      ROM: [] NT  [x] ROM measurements:     AROM LLE (degrees)  LLE General AROM: knee extension = lacking 10*; knee flexion = 110*     Assessment: Body Structures, Functions, Activity Limitations Requiring Skilled Therapeutic Intervention: Decreased functional mobility , Decreased ROM, Decreased strength, Decreased posture, Increased pain, Decreased balance, Decreased endurance  Assessment: Continue to present with left knee extension limitation, however, therapist compared right knee extension limitation & both sides equal at this time. Patient self-reports no limitations secondary to bilateral knee extension limitations. May be patient's baseline bilaterally, prior diagnosis of lymphedema. No increase in pain just soreness post-treatment session. Concluded with CP post-treatment for soreness. Plan to finalize patient's PT POC next visit. Educated patient on utilizing patient's apartment/condo complex gym equipment for continued endurance & strengthening post-therapy. Encouraged patient to ambulate consistently without cane (reports only ambulating with cane for longer distances). Treatment Diagnosis: increased left knee pain, decreased pain free left knee ROM, decreased left LE strength & endurance & balance, decreased ability to perform functional mobility tasks, & impaired postural awareness/equal weight bearing bilateral LEs  Therapy Prognosis: Good          Post-Pain Assessment:       Pain Rating (0-10 pain scale):  \"sore\" /10   Location and pain description same as pre-treatment unless indicated.    Action: [] NA   [x] Perform HEP  [] Meds as prescribed  [x] Modalities as prescribed   [] Call Physician     GOALS   Patient Goal(s): Patient Goals : \"ambulation without assistive device\"    Short Term Goals Completed by 2-4 weeks Goal Status   STG 1 The patient will demonstrate improved postural awareness/equal weight bearing requiring <25% verbal cueing during functional mobility tasks & exercises In progress, Partially met   STG 2 The patient will self-report ability to stand & ambulation >10-15 minutes consistently without increased left knee pain in order to demonstrate improved ability to perform functional mobility tasks Met   STG 3 The patient will ascend/descend 5 stairs consistently with reciprocal stair pattern with unilateral rail without increased left knee pain in order to demonstrate improved ability to get in/out of friend's houses In progress, Partially met     Long Term Goals Completed by 4-6 weeks Goal Status   LTG 1 The patient will demonstrate competency with HEP to progress towards self management of symptoms upon D/C In progress, Partially met   LTG 2 The patient will report decreased left knee pain </=1/10 consistently in order to improve ability to perform functional mobility tasks In progress, Partially met   LTG 3 The patient will improve pain free left knee AROM >/= -5* to 115* in order to increase ability to perform functional mobility tasks efficiently. In progress, Partially met   LTG 4 The patient will demonstrate improved L LE strength >/= 75% of R LE strength in order to perform functional mobility tasks with increased ease. In progress   LTG 5 The patient will have an increase in LEFS score >/=9 points in order to increase ability to perform functional mobility tasks & ADLs.  In progress       Plan:  Frequency/Duration:  Plan  Plan Frequency: 2-3xs/wk  Plan weeks: 6-8 weeks  Current Treatment Recommendations: Strengthening, ROM, Balance training, Functional mobility training, Endurance training, Neuromuscular re-education, Stair training, Gait training, Manual Therapy - Soft Tissue Mobilization, Manual Therapy - Joint Manipulation, Pain management, Home exercise program, Safety education &

## 2022-10-20 ENCOUNTER — HOSPITAL ENCOUNTER (OUTPATIENT)
Dept: PHYSICAL THERAPY | Age: 77
Setting detail: THERAPIES SERIES
Discharge: HOME | End: 2022-10-20
Payer: MEDICARE

## 2022-10-20 PROCEDURE — 97110 THERAPEUTIC EXERCISES: CPT

## 2022-10-20 NOTE — PROGRESS NOTES
[facility]  Outpatient Physical Therapy    Treatment Note        Date: 10/20/2022  Patient: Yovani Cooper  : 1945   Confirmed: Yes  MRN: [id number]  Referring Provider: Esme Li PA-C    Medical Diagnosis: Unilateral primary osteoarthritis, left knee [M17.12]       Treatment Diagnosis: increased left knee pain, decreased pain free left knee ROM, decreased left LE strength & endurance & balance, decreased ability to perform functional mobility tasks, & impaired postural awareness/equal weight bearing bilateral LEs    Visit Information:  Insurance: Payor: Caitlin Pepe / Plan: MEDICARE PART A AND B / Product Type: *No Product type* /   PT Visit Information  Onset Date: 22  PT Insurance Information: Medicare  Total # of Visits Approved: 99  Total # of Visits to Date:   Plan of Care/Certification Expiration Date: 22  No Show: 0  Progress Note Due Date: 10/22/22  Canceled Appointment: 1  Progress Note Counter:  (30 day PN due 10/22/22)    Subjective Information:  Subjective: Patient reports readiness for discharge today. Patient denies any questions.   HEP Compliance:  [x] Good [] Fair [] Poor [] Reports not doing due to:    Pain Screening  Patient Currently in Pain: Denies    Treatment:  Exercises:  Exercises  Exercise 8: Scifit L5.0 x 6 min to improve LE strength and ROM, LEs only  Exercise 15: stair training/assessment without AD & with SPC, independent, intermittent with non-reciprocal & reciprocal stair pattern (limited secondary to RLE NOT LLE)  Exercise 16: ambulation training/assessment without AD, 150'+, independent, equal weight shifting  Exercise 18: objective measurements  Exercise 20: HEP: continue with current (reviewed all exercises/stretches)      *Indicates exercise, modality, or manual techniques to be initiated when appropriate    Objective Measures:       Strength: [] NT  [] MMT completed:  Strength RLE  Comment: hip flexion = 4+/5; hip abduction = 4+/5; hip adduction = 4+/5; knee extension = 5/5; knee flexion = 5/5  Strength LLE  Comment: hip flexion = 4+/5; hip abduction = 4+/5; hip adduction = 4+/5; knee extension = 5/5; knee flexion = 5/5             ROM: [] NT  [x] ROM measurements:     AROM LLE (degrees)  LLE General AROM: knee extension = lacking 10*; knee flexion = 115*       Assessment: Body Structures, Functions, Activity Limitations Requiring Skilled Therapeutic Intervention: Decreased functional mobility , Decreased ROM, Decreased strength, Decreased posture, Increased pain, Decreased balance, Decreased endurance  Assessment: Patient is a 68year old female who presented on 8/17/22 s/p L TKA by Dr. Fatoumata Booth on 7/14/22 who has participated in [address] [place] [address] outpatient PT sessions 8/17/22-10/20/22. Patient has demonstrated progress towards all goals established upon initial evaluation, partially meeting if not completely meeting all goals upon discharge. Patient reports 95-97% improvement in function. Patient is independent/compliant with PT HEP. Patient is appropriate for discharge from outpatient PT services this date with recommendation for patient to continue to perform PT HEP regularly as well as to follow-up with MD as appropriate. Patient to obtain gym membership to continue progressing on her own at discharge. Treatment Diagnosis: increased left knee pain, decreased pain free left knee ROM, decreased left LE strength & endurance & balance, decreased ability to perform functional mobility tasks, & impaired postural awareness/equal weight bearing bilateral LEs  Therapy Prognosis: Good    Post-Pain Assessment:       Pain Rating (0-10 pain scale):   0/10   Location and pain description same as pre-treatment unless indicated.    Action: [] NA   [x] Perform HEP  [] Meds as prescribed  [x] Modalities as prescribed   [] Call Physician     GOALS   Patient Goal(s): Patient Goals : \"ambulation without assistive device\"    Short Term Goals Completed by 2-4 weeks Goal Status   STG 1 The patient will demonstrate improved postural awareness/equal weight bearing requiring <25% verbal cueing during functional mobility tasks & exercises Met   STG 2 The patient will self-report ability to stand & ambulation >10-15 minutes consistently without increased left knee pain in order to demonstrate improved ability to perform functional mobility tasks Met   STG 3 The patient will ascend/descend 5 stairs consistently with reciprocal stair pattern with unilateral rail without increased left knee pain in order to demonstrate improved ability to get in/out of friend's houses Partially met     Long Term Goals Completed by 4-6 weeks Goal Status   LTG 1 The patient will demonstrate competency with HEP to progress towards self management of symptoms upon D/C Met   LTG 2 The patient will report decreased left knee pain </=1/10 consistently in order to improve ability to perform functional mobility tasks Met   LTG 3 The patient will improve pain free left knee AROM >/= -5* to 115* in order to increase ability to perform functional mobility tasks efficiently. Partially met, Met   LTG 4 The patient will demonstrate improved L LE strength >/= 75% of R LE strength in order to perform functional mobility tasks with increased ease. Met   LTG 5 The patient will have an increase in LEFS score >/=9 points in order to increase ability to perform functional mobility tasks & ADLs. Partially met       Plan:  Frequency/Duration:  Plan  Additional Comments: Discharge  Pt to continue current HEP. See objective section for any therapeutic exercise changes, additions or modifications this date.     Therapy Time:      PT Individual Minutes  Time In: 4569  Time Out: 6158  Minutes: 40  Timed Code Treatment Minutes: 40 Minutes  Procedure Minutes: 0 minutes  Timed Activity Minutes Units   Ther Ex 40 3   Electronically signed by Mainor Santiago PT on 10/20/22 at 9:54 AM EDT

## 2022-10-20 NOTE — PROGRESS NOTES
[address]     Ph: [phone]  Fax: [phone]      [] Certification  [] Recertification []  Plan of Care  [] Progress Note [x] Discharge      Referring Provider: Christ Bee PA-C     From:  Hernandez Metcalf, PT, DPT  Patient: Clarke De Souza (23 y.o. female) : 1945 Date: 10/20/2022  Medical Diagnosis: Unilateral primary osteoarthritis, left knee [M17.12]       Treatment Diagnosis: increased left knee pain, decreased pain free left knee ROM, decreased left LE strength & endurance & balance, decreased ability to perform functional mobility tasks, & impaired postural awareness/equal weight bearing bilateral LEs    Plan of Care/Certification Expiration Date: : 22   Progress Report Period from:  2022  to 10/20/2022    Visits to Date: 17 No Show: 0 Cancelled Appts: 1    OBJECTIVE:   Short Term Goals - Time Frame for Short Term Goals: 2-4 weeks    Goals Current/Discharge status  Status   Short Term Goal 1: The patient will demonstrate improved postural awareness/equal weight bearing requiring <25% verbal cueing during functional mobility tasks & exercises  Improved Met   Short Term Goal 2: The patient will self-report ability to stand & ambulation >10-15 minutes consistently without increased left knee pain in order to demonstrate improved ability to perform functional mobility tasks  >10-15 minutes Met   Short Term Goal 3: The patient will ascend/descend 5 stairs consistently with reciprocal stair pattern with unilateral rail without increased left knee pain in order to demonstrate improved ability to get in/out of friend's houses  Intermittent reciprocal & non-reciprocal stair pattern, limited secondary to neuropathy in RLE versus LLE Partially met     Long Term Goals - Time Frame for Long Term Goals : 4-6 weeks  Goals Current/ Discharge status Status   Long Term Goal 1: The patient will demonstrate competency with HEP to progress towards self management of symptoms upon D/C Independent/compliant Met   Long Term Goal 2: The patient will report decreased left knee pain </=1/10 consistently in order to improve ability to perform functional mobility tasks Pain Screening  Patient Currently in Pain: Denies    Met   Long Term Goal 3: The patient will improve pain free left knee AROM >/= -5* to 115* in order to increase ability to perform functional mobility tasks efficiently. AROM LLE (degrees)  LLE General AROM: knee extension = lacking 10*; knee flexion = 115*    Partially met, Met   Long Term Goal 4: The patient will demonstrate improved L LE strength >/= 75% of R LE strength in order to perform functional mobility tasks with increased ease. Strength LLE  Comment: hip flexion = 4+/5; hip abduction = 4+/5; hip adduction = 4+/5; knee extension = 5/5; knee flexion = 5/5  Strength RLE  Comment: hip flexion = 4+/5; hip abduction = 4+/5; hip adduction = 4+/5; knee extension = 5/5; knee flexion = 5/5  Met   Long Term Goal 5: The patient will have an increase in LEFS score >/=9 points in order to increase ability to perform functional mobility tasks & ADLs. Exam: LEFS = 52/80 (+7 point improvement since evaluation)  Partially met     Assessment: Patient is a 68year old female who presented on 8/17/22 s/p L TKA by Dr. Beryle Opal on 7/14/22 who has participated in [facility] outpatient PT sessions 8/17/22-10/20/22. Patient has demonstrated progress towards all goals established upon initial evaluation, partially meeting if not completely meeting all goals upon discharge. Patient reports 95-97% improvement in function. Patient is independent/compliant with PT HEP. Patient is appropriate for discharge from outpatient PT services this date with recommendation for patient to continue to perform PT HEP regularly as well as to follow-up with MD as appropriate.   Patient to obtain gym membership to continue progressing on her own at discharge. PLAN:   Frequency/Duration:  Additional Comments: Discharge                        Patient Status:[] Continue/ Initiate plan of Care    [x] Discharge PT. Recommend pt continue with HEP. [] Additional visits requested, Please re-certify for additional visits:    [] Hold         Signature: Electronically signed by Mainor Santiago PT on 10/20/22 at 9:55 AM EDT      If you have any questions or concerns, please don't hesitate to call. Thank you for your referral.    I have reviewed this plan of care and certify a need for medically necessary rehabilitation services.     Physician Signature:__________________________________________________________  Date:  Please sign and return

## 2022-12-05 ENCOUNTER — HOSPITAL ENCOUNTER (OUTPATIENT)
Dept: WOMENS IMAGING | Age: 77
Discharge: HOME | End: 2022-12-07
Payer: MEDICARE

## 2022-12-05 DIAGNOSIS — Z12.31 ENCOUNTER FOR SCREENING MAMMOGRAM FOR MALIGNANT NEOPLASM OF BREAST: ICD-10-CM

## 2022-12-05 PROCEDURE — 77067 SCR MAMMO BI INCL CAD: CPT

## 2023-05-02 ENCOUNTER — ANESTHESIA EVENT (OUTPATIENT)
Dept: OPERATING ROOM | Age: 78
End: 2023-05-02
Payer: MEDICARE

## 2023-05-02 ASSESSMENT — ENCOUNTER SYMPTOMS: SHORTNESS OF BREATH: 1

## 2023-05-03 ENCOUNTER — HOSPITAL ENCOUNTER (OUTPATIENT)
Age: 78
Setting detail: OUTPATIENT SURGERY
Discharge: HOME | End: 2023-05-03
Attending: ORTHOPAEDIC SURGERY | Admitting: ORTHOPAEDIC SURGERY
Payer: MEDICARE

## 2023-05-03 ENCOUNTER — ANESTHESIA (OUTPATIENT)
Dept: OPERATING ROOM | Age: 78
End: 2023-05-03
Payer: MEDICARE

## 2023-05-03 VITALS
SYSTOLIC BLOOD PRESSURE: 146 MMHG | TEMPERATURE: 97 F | BODY MASS INDEX: 35.36 KG/M2 | HEART RATE: 68 BPM | OXYGEN SATURATION: 95 % | DIASTOLIC BLOOD PRESSURE: 66 MMHG | WEIGHT: 220 LBS | RESPIRATION RATE: 16 BRPM | HEIGHT: 66 IN

## 2023-05-03 PROCEDURE — 3600000012 HC SURGERY LEVEL 2 ADDTL 15MIN: Performed by: ORTHOPAEDIC SURGERY

## 2023-05-03 PROCEDURE — A4217 STERILE WATER/SALINE, 500 ML: HCPCS | Performed by: ORTHOPAEDIC SURGERY

## 2023-05-03 PROCEDURE — 3700000000 HC ANESTHESIA ATTENDED CARE: Performed by: ORTHOPAEDIC SURGERY

## 2023-05-03 PROCEDURE — 2709999900 HC NON-CHARGEABLE SUPPLY: Performed by: ORTHOPAEDIC SURGERY

## 2023-05-03 PROCEDURE — 6360000002 HC RX W HCPCS: Performed by: ORTHOPAEDIC SURGERY

## 2023-05-03 PROCEDURE — 2580000003 HC RX 258: Performed by: ORTHOPAEDIC SURGERY

## 2023-05-03 PROCEDURE — 3600000002 HC SURGERY LEVEL 2 BASE: Performed by: ORTHOPAEDIC SURGERY

## 2023-05-03 PROCEDURE — 2500000003 HC RX 250 WO HCPCS: Performed by: ORTHOPAEDIC SURGERY

## 2023-05-03 PROCEDURE — 7100000010 HC PHASE II RECOVERY - FIRST 15 MIN: Performed by: ORTHOPAEDIC SURGERY

## 2023-05-03 PROCEDURE — 3700000001 HC ADD 15 MINUTES (ANESTHESIA): Performed by: ORTHOPAEDIC SURGERY

## 2023-05-03 RX ORDER — SODIUM CHLORIDE 0.9 % (FLUSH) 0.9 %
5-40 SYRINGE (ML) INJECTION PRN
Status: DISCONTINUED | OUTPATIENT
Start: 2023-05-03 | End: 2023-05-03 | Stop reason: HOSPADM

## 2023-05-03 RX ORDER — SODIUM CHLORIDE 0.9 % (FLUSH) 0.9 %
5-40 SYRINGE (ML) INJECTION EVERY 12 HOURS SCHEDULED
Status: DISCONTINUED | OUTPATIENT
Start: 2023-05-03 | End: 2023-05-03 | Stop reason: HOSPADM

## 2023-05-03 RX ORDER — ONDANSETRON 2 MG/ML
4 INJECTION INTRAMUSCULAR; INTRAVENOUS
Status: DISCONTINUED | OUTPATIENT
Start: 2023-05-03 | End: 2023-05-03 | Stop reason: HOSPADM

## 2023-05-03 RX ORDER — CEFAZOLIN SODIUM IN 0.9 % NACL 2 G/100 ML
2000 PLASTIC BAG, INJECTION (ML) INTRAVENOUS ONCE
Status: COMPLETED | OUTPATIENT
Start: 2023-05-03 | End: 2023-05-03

## 2023-05-03 RX ORDER — METOCLOPRAMIDE HYDROCHLORIDE 5 MG/ML
10 INJECTION INTRAMUSCULAR; INTRAVENOUS
Status: DISCONTINUED | OUTPATIENT
Start: 2023-05-03 | End: 2023-05-03 | Stop reason: HOSPADM

## 2023-05-03 RX ORDER — SODIUM CHLORIDE 9 MG/ML
25 INJECTION, SOLUTION INTRAVENOUS PRN
Status: DISCONTINUED | OUTPATIENT
Start: 2023-05-03 | End: 2023-05-03 | Stop reason: HOSPADM

## 2023-05-03 RX ORDER — LIDOCAINE HYDROCHLORIDE 10 MG/ML
1 INJECTION, SOLUTION EPIDURAL; INFILTRATION; INTRACAUDAL; PERINEURAL
Status: DISCONTINUED | OUTPATIENT
Start: 2023-05-03 | End: 2023-05-03 | Stop reason: HOSPADM

## 2023-05-03 RX ORDER — MAGNESIUM HYDROXIDE 1200 MG/15ML
LIQUID ORAL CONTINUOUS PRN
Status: COMPLETED | OUTPATIENT
Start: 2023-05-03 | End: 2023-05-03

## 2023-05-03 RX ORDER — SODIUM CHLORIDE 9 MG/ML
INJECTION, SOLUTION INTRAVENOUS PRN
Status: DISCONTINUED | OUTPATIENT
Start: 2023-05-03 | End: 2023-05-03 | Stop reason: HOSPADM

## 2023-05-03 RX ORDER — DIPHENHYDRAMINE HYDROCHLORIDE 50 MG/ML
12.5 INJECTION INTRAMUSCULAR; INTRAVENOUS
Status: DISCONTINUED | OUTPATIENT
Start: 2023-05-03 | End: 2023-05-03 | Stop reason: HOSPADM

## 2023-05-03 RX ORDER — SODIUM CHLORIDE, SODIUM LACTATE, POTASSIUM CHLORIDE, CALCIUM CHLORIDE 600; 310; 30; 20 MG/100ML; MG/100ML; MG/100ML; MG/100ML
INJECTION, SOLUTION INTRAVENOUS CONTINUOUS
Status: DISCONTINUED | OUTPATIENT
Start: 2023-05-03 | End: 2023-05-03 | Stop reason: HOSPADM

## 2023-05-03 RX ORDER — OXYCODONE HYDROCHLORIDE 5 MG/1
5 TABLET ORAL
Status: DISCONTINUED | OUTPATIENT
Start: 2023-05-03 | End: 2023-05-03 | Stop reason: HOSPADM

## 2023-05-03 RX ORDER — LIDOCAINE HYDROCHLORIDE AND EPINEPHRINE 10; 10 MG/ML; UG/ML
20 INJECTION, SOLUTION INFILTRATION; PERINEURAL ONCE
Status: COMPLETED | OUTPATIENT
Start: 2023-05-03 | End: 2023-05-03

## 2023-05-03 RX ORDER — FENTANYL CITRATE 0.05 MG/ML
50 INJECTION, SOLUTION INTRAMUSCULAR; INTRAVENOUS EVERY 10 MIN PRN
Status: DISCONTINUED | OUTPATIENT
Start: 2023-05-03 | End: 2023-05-03 | Stop reason: HOSPADM

## 2023-05-03 RX ORDER — MEPERIDINE HYDROCHLORIDE 25 MG/ML
12.5 INJECTION INTRAMUSCULAR; INTRAVENOUS; SUBCUTANEOUS
Status: DISCONTINUED | OUTPATIENT
Start: 2023-05-03 | End: 2023-05-03 | Stop reason: HOSPADM

## 2023-05-03 RX ADMIN — SODIUM CHLORIDE, POTASSIUM CHLORIDE, SODIUM LACTATE AND CALCIUM CHLORIDE: 600; 310; 30; 20 INJECTION, SOLUTION INTRAVENOUS at 06:26

## 2023-05-03 RX ADMIN — SODIUM BICARBONATE 2 MEQ: 84 INJECTION, SOLUTION INTRAVENOUS at 07:03

## 2023-05-03 RX ADMIN — Medication 2000 MG: at 07:30

## 2023-05-03 RX ADMIN — LIDOCAINE HYDROCHLORIDE,EPINEPHRINE BITARTRATE 20 ML: 10; .01 INJECTION, SOLUTION INFILTRATION; PERINEURAL at 07:03

## 2023-05-03 ASSESSMENT — PAIN - FUNCTIONAL ASSESSMENT: PAIN_FUNCTIONAL_ASSESSMENT: 0-10

## 2023-05-03 NOTE — OP NOTE
Operative Note      Patient: Celeste Polanco  YOB: 1945  MRN: [id number]    Date of Procedure: 5/3/2023      Preoperative diagnosis: Right carpal tunnel syndrome     Postoperative diagnosis: Right carpal tunnel syndrome     Procedure planned: Right carpal tunnel release     Procedure performed: Right carpal tunnel release     Surgeon: Lexi Medeiros D.O. Assistant: MIKAEL Oswald  The physician assistant was present through the entire case. Given the nature of the disease process and the procedure to be performed a skilled surgical assistant was necessary during the case. The assistant was necessary in order to hold retractors and directly assist in the operation. A certified scrub tech was at the back table managing instruments and supplies for the surgical case. Anesthesia: Local field block using lidocaine with epinephrine solution and monitored by the anesthesia team     Estimated blood loss: Less than 10 mL     Drains: None     Tourniquet: None     Specimens: None     Implants: None     Indications: The patient presented to the office with subjective symptoms physical examination an EMG nerve conduction study test consistent with right carpal tunnel syndrome. The patient had failure of nonoperative treatment strategies. After full discussion regarding risks benefits and alternatives the patient elected to forego any additional nonoperative management in favor of right carpal tunnel release. Informed consent was signed and placed in the chart. Complications: None noted at the time of surgery     Description of operation: The patient was taken to the operative suite and placed in the supine position on the operating table. A timeout was performed and the right carpal tunnel was confirmed to be the operative site. The patient was carefully positioned on the table in such a fashion as to pad all bony prominences and peripheral nerves.  The patient was administered appropriate

## 2023-05-03 NOTE — ANESTHESIA PRE PROCEDURE
Department of Anesthesiology  Preprocedure Note       Name:  Mita Weber   Age:  68 y.o.  :  1945                                          MRN:  [id number]         Date:  5/3/2023      Surgeon: Ori Craft):  Shannan Cordova DO    Procedure: Procedure(s):  RIGHT WRIST CARPAL TUNNEL RELEASE. SUPINE, WIDE AWAKE BLOCK. MAC AND LOCAL    Medications prior to admission:   Prior to Admission medications    Medication Sig Start Date End Date Taking? Authorizing Provider   sertraline (ZOLOFT) 50 MG tablet Take 1 tablet by mouth 3/30/15   Historical Provider, MD   ranitidine (ZANTAC) 300 MG tablet TAKE 1 TABLET BY MOUTH EVERY NIGHT 19   Josh Saldaña MD   metoprolol tartrate (LOPRESSOR) 25 MG tablet TK 1 T PO BID 9/10/18   Historical Provider, MD   atorvastatin (LIPITOR) 80 MG tablet Take 1 tablet by mouth daily    Historical Provider, MD   CPAP Machine MISC by Does not apply route 10/5/17   Josh Saldaña MD   escitalopram (LEXAPRO) 10 MG tablet 1 tablet daily 17   Historical Provider, MD   ALPRAZolam Phylliss Bolds) 0.25 MG tablet 0.25 mg 2 times daily as needed .  17   Historical Provider, MD   omeprazole (PRILOSEC) 20 MG delayed release capsule 1 capsule Daily 17   Historical Provider, MD   nitroGLYCERIN (NITROSTAT) 0.4 MG SL tablet  17   Historical Provider, MD   aspirin 81 MG EC tablet Take 1 tablet by mouth daily    Historical Provider, MD   meclizine (ANTIVERT) 25 MG tablet Take 25 mg by mouth 3 times daily as needed    Historical Provider, MD   triamterene-hydrochlorothiazide (MAXZIDE-25) 37.5-25 MG per tablet Take 1 tablet by mouth as needed    Historical Provider, MD   ondansetron (ZOFRAN) 4 MG tablet Take 1 tablet by mouth every 8 hours as needed for Nausea 17   Marley Lewis MD       Current medications:    Current Facility-Administered Medications   Medication Dose Route Frequency Provider Last Rate Last Admin    lidocaine-EPINEPHrine 1 %-[id number] injection 20 mL  20 mL IntraDERmal

## 2023-05-03 NOTE — PROGRESS NOTES
Patient ID:  Дмитрий Michelle  [id number]  68 y.o.  1945  TAKEN TO PHASE 2,   ATTACHED TO MONITOR AND REPORT GIVEN TO RN.   VSS DRSG DRY AND INTACT        Electronically signed by Hawa Herbert RN on 5/3/2023 at 7:44 AM

## 2023-05-03 NOTE — ANESTHESIA POSTPROCEDURE EVALUATION
Department of Anesthesiology  Postprocedure Note    Patient: Niels Manning  MRN: [id number]  YOB: 1945  Date of evaluation: 5/3/2023      Procedure Summary     Date: 05/03/23 Room / Location: [address]    Anesthesia Start: 6662 Anesthesia Stop: 0115    Procedure: RIGHT WRIST CARPAL TUNNEL RELEASE (Right: Wrist) Diagnosis:       Carpal tunnel syndrome, right      (RIGHT WRIST CARPAL TUNNEL SYNDROME)    Surgeons: Akhil Morton DO Responsible Provider: Rosio Valverde DO    Anesthesia Type: MAC ASA Status: 3          Anesthesia Type: No value filed.     Timbo Phase I: Timbo Score: 10    Timbo Phase II:        Anesthesia Post Evaluation    Patient location during evaluation: bedside  Patient participation: complete - patient participated  Level of consciousness: awake and alert  Airway patency: patent  Nausea & Vomiting: no nausea and no vomiting  Complications: no  Cardiovascular status: blood pressure returned to baseline and hemodynamically stable  Respiratory status: acceptable  Hydration status: euvolemic

## 2023-05-15 LAB
ALANINE AMINOTRANSFERASE (SGPT) (U/L) IN SER/PLAS: 15 U/L (ref 7–45)
ALBUMIN (G/DL) IN SER/PLAS: 4.1 G/DL (ref 3.4–5)
ALKALINE PHOSPHATASE (U/L) IN SER/PLAS: 96 U/L (ref 33–136)
ANION GAP IN SER/PLAS: 13 MMOL/L (ref 10–20)
ASPARTATE AMINOTRANSFERASE (SGOT) (U/L) IN SER/PLAS: 15 U/L (ref 9–39)
BILIRUBIN TOTAL (MG/DL) IN SER/PLAS: 0.3 MG/DL (ref 0–1.2)
CALCIDIOL (25 OH VITAMIN D3) (NG/ML) IN SER/PLAS: 28 NG/ML
CALCIUM (MG/DL) IN SER/PLAS: 9.3 MG/DL (ref 8.6–10.3)
CARBON DIOXIDE, TOTAL (MMOL/L) IN SER/PLAS: 26 MMOL/L (ref 21–32)
CHLORIDE (MMOL/L) IN SER/PLAS: 105 MMOL/L (ref 98–107)
CHOLESTEROL (MG/DL) IN SER/PLAS: 268 MG/DL (ref 0–199)
CHOLESTEROL IN HDL (MG/DL) IN SER/PLAS: 58.2 MG/DL
CHOLESTEROL/HDL RATIO: 4.6
CREATININE (MG/DL) IN SER/PLAS: 0.76 MG/DL (ref 0.5–1.05)
ERYTHROCYTE DISTRIBUTION WIDTH (RATIO) BY AUTOMATED COUNT: 13.4 % (ref 11.5–14.5)
ERYTHROCYTE MEAN CORPUSCULAR HEMOGLOBIN CONCENTRATION (G/DL) BY AUTOMATED: 32.9 G/DL (ref 32–36)
ERYTHROCYTE MEAN CORPUSCULAR VOLUME (FL) BY AUTOMATED COUNT: 92 FL (ref 80–100)
ERYTHROCYTES (10*6/UL) IN BLOOD BY AUTOMATED COUNT: 4.32 X10E12/L (ref 4–5.2)
ESTIMATED AVERAGE GLUCOSE FOR HBA1C: 117 MG/DL
GFR FEMALE: 80 ML/MIN/1.73M2
GLUCOSE (MG/DL) IN SER/PLAS: 101 MG/DL (ref 74–99)
HEMATOCRIT (%) IN BLOOD BY AUTOMATED COUNT: 39.8 % (ref 36–46)
HEMOGLOBIN (G/DL) IN BLOOD: 13.1 G/DL (ref 12–16)
HEMOGLOBIN A1C/HEMOGLOBIN TOTAL IN BLOOD: 5.7 %
LDL: 184 MG/DL (ref 0–99)
LEUKOCYTES (10*3/UL) IN BLOOD BY AUTOMATED COUNT: 4.7 X10E9/L (ref 4.4–11.3)
PLATELETS (10*3/UL) IN BLOOD AUTOMATED COUNT: 240 X10E9/L (ref 150–450)
POTASSIUM (MMOL/L) IN SER/PLAS: 4 MMOL/L (ref 3.5–5.3)
PROTEIN TOTAL: 7.4 G/DL (ref 6.4–8.2)
SODIUM (MMOL/L) IN SER/PLAS: 140 MMOL/L (ref 136–145)
THYROTROPIN (MIU/L) IN SER/PLAS BY DETECTION LIMIT <= 0.05 MIU/L: 3.23 MIU/L (ref 0.44–3.98)
TRIGLYCERIDE (MG/DL) IN SER/PLAS: 128 MG/DL (ref 0–149)
UREA NITROGEN (MG/DL) IN SER/PLAS: 19 MG/DL (ref 6–23)
VLDL: 26 MG/DL (ref 0–40)

## 2023-06-13 NOTE — PROGRESS NOTES
Called pt to sched- pt stated he will call back. inclined surfaces or uneven ground, so she purchases a rollator which she seldom uses. Using medication for migraines and restless leg syndrome, although reports nausea and hot flashes as side effects. Pain in low back and legs subsides when sitting in recliner with LEs elevated. Burning in R leg occurs at night. Objective:      Balance  Comments: hx of falls on uneven surfaces       Ambulation 1  Surface: carpet  Device: No Device  Gait Deviations: Slow Anamika, Decreased step length, Decreased step height  Comments: mild toe drag LLE; purchased a rollator for long distance ambulation but arrived to therapy w/o AD        Transfers  Sit to Stand: Modified independent  Stand to sit: Modified independent  Comment: UE displayed during all transfers    Strength RLE  Comment: Hip: 3+ to 4-; knee: 4/5  Strength LLE  Comment: Hip: 3+ to 4- ; knee: 4-/5        Strength Other  Other: 5x sit to stand: 13 seconds    PROM RLE (degrees)  RLE PROM: WFL  AROM RLE (degrees)  RLE AROM: WFL  PROM LLE (degrees)  LLE PROM: WFL  LLE General PROM: lacks terminal knee extension by at least 10 deg  AROM LLE (degrees)  LLE AROM : WFL         Spine  Cervical: dull pain with Rot R and SB L, Raul Rot ~75% WNL  Lumbar: seated flex WNL    Observation/Palpation  Posture: Fair  Palpation: no tenderness throughout cervical muscles  Observation: mild unsteadiness with gait; mild fwd head posture  Bed mobility  Supine to Sit: Independent  Sit to Supine: Independent         Exercises:   Exercises  Exercise 1: Aquatics*  Exercise 2: laps*  Exercise 3: sink ex*  Exercise 4: step ups*  Exercise 5: bicycles*  Exercise 6: deep end hang with LE AROM*  Modalities:  Modalities  Moist heat: *  Cryotherapy (Minutes\Location): *  Ultrasound: *  E-stim (parameters):  *  Manual:  Manual therapy  PROM: *LE stretches  Soft Tissue Mobalization: *cervical, thoracic, lumbar  *Indicates exercise,modality, or manual techniques to be initiated when appropriate  Assessment: Body structures, Functions, Activity limitations: Increased pain, Decreased strength, Decreased ROM, Decreased functional mobility , Decreased high-level IADLs  Assessment: Pt is a 77 yo female referred for PT eval of low back and neck pain. Pt reports low back pain also results in LE weakness, noting hx of instability and falls. Pt reports primary limitation with daily activity is lower extremity weakness and LBP. Pt displays impaired gait with mild unsteadiness, LE weakness, and notes pain increases with standing activity. Pt will trial aquatic therapy with progression into land based treatment for neck, back, and LEs working toward pain mgmt, improved mobility, and increased safety. Discharge Recommendations: Continue to assess pending progress        Decision Making: Medium Complexity  History: arthritis, depression, L knee pain, LBP, neck pain  Exam: self reports 75% daily function  Clinical Presentation: evolving        Plan  Frequency/Duration:  Plan  Times per week: 2-3  Plan weeks: 6 weeks  Current Treatment Recommendations: Aquatics, ROM, Strengthening, Balance Training, Functional Mobility Training, Home Exercise Program, Modalities, Manual Therapy - Soft Tissue Mobilization, Manual Therapy - Joint Manipulation  Plan Comment: starting with aquatics and advancing to land treatments         Patient Education  New Education Provided: PT Education: PT Role;Plan of Care;Goals  Patient Education: Aquatics sign off sheet reviewed and signed with patient    POST-PAIN     Pain Rating (0-10 pain scale):   Location and pain description same as pre-treatment unless indicated. Action: [] NA  [] Call Physician  [] Perform HEP  [x] Meds as prescribed    Evaluation and patient rights have been reviewed and patient agrees with plan of care.   Yes  [x]  No  []   Explain:       Brendan Fall Risk Assessment  Risk Factor Scale  Score   History of Falls [x] Yes  [] No 25  0 25   Secondary Diagnosis [] Yes  [x] No 15  0    Ambulatory Aid [] Furniture  [x] Crutches/cane/walker (PRN)  [] None/bedrest/wheelchair/nurse 30  15  0 15   IV/Heparin Lock [] Yes  [x] No 20  0    Gait/Transferring [] Impaired  [] Weak  [x] Normal/bedrest/immobile 20  10  0    Mental Status [] Forgets limitations  [x] Oriented to own ability 15  0       Total: 40     Based on the Assessment score: check the appropriate box. []  No intervention needed   Low =   Score of 0-24  [x]  Use standard prevention interventions Moderate =  Score of 24-44   [x] Discuss fall prevention strategies   [x] Indicate moderate falls risk on eval  []  Use high risk prevention interventions High = Score of 45 and higher   [] Discuss fall prevention strategies   [] Provide supervision during treatment time    Goals  Short term goals  Time Frame for Short term goals: 3  Short term goal 1: Pt will report at least 50% decrease in LBP on days participating in aquatic therapy  Long term goals  Time Frame for Long term goals : 6  Long term goal 1: Independent and compliant with aquatic and land based HEPs to continue physical activity post-discharge  Long term goal 2: LE strength at least 4+/5 to ambulate up/down 8\" steps with HR support  Long term goal 3: Pt will complete 6' walk test with LRAD displaying good stability throughout duration of test to demo improved endurance.   Long term goal 4: Cervical AROM WNL w/o discomfort to improve driving tolerance         PT Individual Minutes  Time In: 1525  Time Out: 1610  Minutes: 45     Procedure Minutes: 45 min eval       Electronically signed by Elodia Galarza PT on 4/14/21 at 6:07 PM EDT

## 2023-09-18 ENCOUNTER — OFFICE VISIT (OUTPATIENT)
Dept: PULMONOLOGY | Age: 78
End: 2023-09-18
Payer: MEDICARE

## 2023-09-18 VITALS
BODY MASS INDEX: 34.28 KG/M2 | TEMPERATURE: 97 F | RESPIRATION RATE: 16 BRPM | HEIGHT: 67 IN | OXYGEN SATURATION: 96 % | HEART RATE: 92 BPM | DIASTOLIC BLOOD PRESSURE: 84 MMHG | SYSTOLIC BLOOD PRESSURE: 148 MMHG | WEIGHT: 218.4 LBS

## 2023-09-18 DIAGNOSIS — R06.09 DOE (DYSPNEA ON EXERTION): Primary | ICD-10-CM

## 2023-09-18 DIAGNOSIS — E66.09 CLASS 1 OBESITY DUE TO EXCESS CALORIES WITHOUT SERIOUS COMORBIDITY WITH BODY MASS INDEX (BMI) OF 34.0 TO 34.9 IN ADULT: ICD-10-CM

## 2023-09-18 DIAGNOSIS — R06.09 DOE (DYSPNEA ON EXERTION): ICD-10-CM

## 2023-09-18 DIAGNOSIS — G47.33 OSA (OBSTRUCTIVE SLEEP APNEA): ICD-10-CM

## 2023-09-18 PROCEDURE — 99214 OFFICE O/P EST MOD 30 MIN: CPT | Performed by: INTERNAL MEDICINE

## 2023-09-18 PROCEDURE — G8417 CALC BMI ABV UP PARAM F/U: HCPCS | Performed by: INTERNAL MEDICINE

## 2023-09-18 PROCEDURE — 1036F TOBACCO NON-USER: CPT | Performed by: INTERNAL MEDICINE

## 2023-09-18 PROCEDURE — 1123F ACP DISCUSS/DSCN MKR DOCD: CPT | Performed by: INTERNAL MEDICINE

## 2023-09-18 PROCEDURE — G8399 PT W/DXA RESULTS DOCUMENT: HCPCS | Performed by: INTERNAL MEDICINE

## 2023-09-18 PROCEDURE — G8427 DOCREV CUR MEDS BY ELIG CLIN: HCPCS | Performed by: INTERNAL MEDICINE

## 2023-09-18 PROCEDURE — 1090F PRES/ABSN URINE INCON ASSESS: CPT | Performed by: INTERNAL MEDICINE

## 2023-09-18 RX ORDER — DIPHENHYDRAMINE HYDROCHLORIDE 25 MG/1
TABLET ORAL
COMMUNITY

## 2023-09-18 RX ORDER — ROPINIROLE 1 MG/1
TABLET, FILM COATED ORAL
COMMUNITY
Start: 2021-09-29

## 2023-09-18 RX ORDER — GABAPENTIN 100 MG/1
CAPSULE ORAL
COMMUNITY
Start: 2021-09-29

## 2023-09-18 NOTE — PROGRESS NOTES
Neurological:      Mental Status: She is alert and oriented to person, place, and time. Psychiatric:         Judgment: Judgment normal.         Imaging studies films reviewed and interpreted by me May 2023 no infiltrate, no interstitial lung disease  Lab results reviewed in chart  PFT 2017 shows FEV1 81% FEV1/FVC 0.85, TLC 80%, DLCO 92%  Sleep study 2017 AHI 7  ECHO: 2023 shows EF 60%  Assessment and Plan       Diagnosis Orders   1. BALBUENA (dyspnea on exertion)  Bronchial Challenge    tiotropium (SPIRIVA RESPIMAT) 2.5 MCG/ACT AERS inhaler      2. Class 1 obesity due to excess calories without serious comorbidity with body mass index (BMI) of 34.0 to 34.9 in adult        3. CARLOS (obstructive sleep apnea)          BALBUENA, etiology is not clear, will obtain methacholine challenge test, start treatment trial with Spiriva in light of history of smoking, and reevaluate on follow-up. Will consider cardiopulmonary exercise testing  Weight loss is recommended  Patient had recent repeat sleep study, will obtain [facility]. Orders Placed This Encounter   Procedures    Bronchial Challenge     Standing Status:   Future     Standing Expiration Date:   3/18/2025     Orders Placed This Encounter   Medications    tiotropium (SPIRIVA RESPIMAT) 2.5 MCG/ACT AERS inhaler     Sig: Inhale 2 puffs into the lungs daily     Dispense:  1 each     Refill:  0            Discussed with patient the importance of exercise and weight control and  overall health and well-being. Reviewed with the patient: current clinical status, medications, activities and diet. Side effects, adverse effects of the medication prescribed today, as well as treatment plan and result expectations have been discussed with the patient who expresses understanding and desires to proceed. Return in about 4 weeks (around 10/16/2023).       Johana Rodriguez MD

## 2023-10-09 ENCOUNTER — HOSPITAL ENCOUNTER (OUTPATIENT)
Dept: PULMONOLOGY | Age: 78
Discharge: HOME | End: 2023-10-09
Attending: INTERNAL MEDICINE
Payer: MEDICARE

## 2023-10-09 DIAGNOSIS — R06.09 DOE (DYSPNEA ON EXERTION): ICD-10-CM

## 2023-10-09 PROCEDURE — 6360000002 HC RX W HCPCS

## 2023-10-09 PROCEDURE — 94070 EVALUATION OF WHEEZING: CPT

## 2023-10-09 RX ORDER — ALBUTEROL SULFATE 2.5 MG/3ML
SOLUTION RESPIRATORY (INHALATION)
Status: COMPLETED
Start: 2023-10-09 | End: 2023-10-09

## 2023-10-09 RX ADMIN — ALBUTEROL SULFATE 2.5 MG: 2.5 SOLUTION RESPIRATORY (INHALATION) at 13:31

## 2023-10-12 PROBLEM — E66.812 CLASS 2 OBESITY DUE TO EXCESS CALORIES WITH BODY MASS INDEX (BMI) OF 35.0 TO 35.9 IN ADULT: Status: ACTIVE | Noted: 2023-10-12

## 2023-10-12 PROBLEM — M19.012 OSTEOARTHRITIS OF SHOULDERS, BILATERAL: Status: ACTIVE | Noted: 2023-10-12

## 2023-10-12 PROBLEM — M19.011 OSTEOARTHRITIS OF SHOULDERS, BILATERAL: Status: ACTIVE | Noted: 2023-10-12

## 2023-10-12 PROBLEM — G47.33 OBSTRUCTIVE SLEEP APNEA, ADULT: Status: ACTIVE | Noted: 2023-10-12

## 2023-10-12 PROBLEM — I10 ESSENTIAL HYPERTENSION: Status: ACTIVE | Noted: 2023-10-12

## 2023-10-12 PROBLEM — G62.9 PERIPHERAL NEUROPATHY: Status: ACTIVE | Noted: 2023-10-12

## 2023-10-12 PROBLEM — M54.17 LUMBOSACRAL RADICULOPATHY AT L5: Status: ACTIVE | Noted: 2023-10-12

## 2023-10-12 PROBLEM — I65.29 CAROTID ATHEROSCLEROSIS: Status: ACTIVE | Noted: 2023-10-12

## 2023-10-12 PROBLEM — K27.9 PUD (PEPTIC ULCER DISEASE): Status: ACTIVE | Noted: 2023-10-12

## 2023-10-12 PROBLEM — R42 VERTIGO: Status: ACTIVE | Noted: 2023-10-12

## 2023-10-12 PROBLEM — I83.93 VARICOSE VEINS OF LEGS: Status: ACTIVE | Noted: 2023-10-12

## 2023-10-12 PROBLEM — G56.00 CARPAL TUNNEL SYNDROME: Status: ACTIVE | Noted: 2023-10-12

## 2023-10-12 PROBLEM — F32.5 DEPRESSION, MAJOR, IN REMISSION (CMS-HCC): Status: ACTIVE | Noted: 2023-10-12

## 2023-10-12 PROBLEM — M85.80 OSTEOPENIA: Status: ACTIVE | Noted: 2023-10-12

## 2023-10-12 PROBLEM — E55.9 VITAMIN D DEFICIENCY: Status: ACTIVE | Noted: 2023-10-12

## 2023-10-12 PROBLEM — I27.0 PRIMARY PULMONARY HYPERTENSION (MULTI): Status: ACTIVE | Noted: 2023-10-12

## 2023-10-12 PROBLEM — H81.09 MENIERE'S DISEASE: Status: ACTIVE | Noted: 2023-10-12

## 2023-10-12 PROBLEM — R32 FEMALE INCONTINENCE: Status: ACTIVE | Noted: 2023-10-12

## 2023-10-12 PROBLEM — E66.09 CLASS 2 OBESITY DUE TO EXCESS CALORIES WITH BODY MASS INDEX (BMI) OF 35.0 TO 35.9 IN ADULT: Status: ACTIVE | Noted: 2023-10-12

## 2023-10-12 PROBLEM — E78.5 HYPERLIPIDEMIA: Status: ACTIVE | Noted: 2023-10-12

## 2023-10-12 PROBLEM — K21.9 ESOPHAGEAL REFLUX: Status: ACTIVE | Noted: 2023-10-12

## 2023-10-12 PROBLEM — M47.816 ARTHRITIS, LUMBAR SPINE: Status: ACTIVE | Noted: 2023-10-12

## 2023-10-12 PROBLEM — R06.02 SHORTNESS OF BREATH: Status: ACTIVE | Noted: 2023-10-12

## 2023-10-12 RX ORDER — MELOXICAM 7.5 MG/1
7.5 TABLET ORAL 2 TIMES DAILY
COMMUNITY
End: 2023-10-17 | Stop reason: WASHOUT

## 2023-10-12 RX ORDER — GABAPENTIN 100 MG/1
300 CAPSULE ORAL NIGHTLY
COMMUNITY
Start: 2021-09-29

## 2023-10-12 RX ORDER — METOPROLOL TARTRATE 25 MG/1
25 TABLET, FILM COATED ORAL 2 TIMES DAILY
COMMUNITY
Start: 2018-09-10 | End: 2023-11-20

## 2023-10-12 RX ORDER — ESCITALOPRAM OXALATE 10 MG/1
1 TABLET ORAL 2 TIMES DAILY
COMMUNITY
Start: 2017-07-14

## 2023-10-12 RX ORDER — FERROUS SULFATE 325(65) MG
65 TABLET, DELAYED RELEASE (ENTERIC COATED) ORAL EVERY OTHER DAY
COMMUNITY
End: 2023-10-17 | Stop reason: WASHOUT

## 2023-10-12 RX ORDER — ROPINIROLE 1 MG/1
1 TABLET, FILM COATED ORAL 3 TIMES DAILY
COMMUNITY
Start: 2021-09-29

## 2023-10-12 RX ORDER — FOLIC ACID 0.8 MG
1 TABLET ORAL DAILY
COMMUNITY
End: 2023-10-17 | Stop reason: WASHOUT

## 2023-10-12 RX ORDER — ONDANSETRON 4 MG/1
4 TABLET, ORALLY DISINTEGRATING ORAL EVERY 8 HOURS PRN
COMMUNITY
Start: 2021-06-23

## 2023-10-12 RX ORDER — MECLIZINE HYDROCHLORIDE 25 MG/1
1 TABLET ORAL 3 TIMES DAILY PRN
COMMUNITY
Start: 2021-06-23

## 2023-10-12 RX ORDER — TRIAMTERENE/HYDROCHLOROTHIAZID 37.5-25 MG
1 TABLET ORAL 2 TIMES DAILY
COMMUNITY
Start: 2020-06-16

## 2023-10-12 RX ORDER — OMEGA-3-ACID ETHYL ESTERS 1 G/1
1 CAPSULE, LIQUID FILLED ORAL DAILY
COMMUNITY
End: 2023-10-17 | Stop reason: WASHOUT

## 2023-10-12 RX ORDER — ATORVASTATIN CALCIUM 80 MG/1
1 TABLET, FILM COATED ORAL NIGHTLY
COMMUNITY
Start: 2020-06-16 | End: 2023-12-20 | Stop reason: SDUPTHER

## 2023-10-12 RX ORDER — OMEPRAZOLE 20 MG/1
1 CAPSULE, DELAYED RELEASE ORAL
COMMUNITY
Start: 2017-06-01

## 2023-10-12 RX ORDER — ASPIRIN 81 MG/1
1 TABLET ORAL DAILY
COMMUNITY
Start: 2017-01-24

## 2023-10-12 RX ORDER — NITROGLYCERIN 0.4 MG/1
0.4 TABLET SUBLINGUAL EVERY 5 MIN PRN
COMMUNITY
Start: 2017-05-31 | End: 2024-01-16 | Stop reason: SDUPTHER

## 2023-10-12 RX ORDER — ACETAMINOPHEN AND PHENYLEPHRINE HCL 325; 5 MG/1; MG/1
1 TABLET ORAL DAILY
COMMUNITY

## 2023-10-17 ENCOUNTER — OFFICE VISIT (OUTPATIENT)
Dept: PRIMARY CARE | Facility: CLINIC | Age: 78
End: 2023-10-17
Payer: MEDICARE

## 2023-10-17 VITALS
HEIGHT: 66 IN | TEMPERATURE: 97 F | DIASTOLIC BLOOD PRESSURE: 84 MMHG | WEIGHT: 215.8 LBS | HEART RATE: 86 BPM | SYSTOLIC BLOOD PRESSURE: 114 MMHG | OXYGEN SATURATION: 95 % | BODY MASS INDEX: 34.68 KG/M2

## 2023-10-17 DIAGNOSIS — E78.2 MIXED HYPERLIPIDEMIA: ICD-10-CM

## 2023-10-17 DIAGNOSIS — I10 ESSENTIAL HYPERTENSION: Primary | ICD-10-CM

## 2023-10-17 DIAGNOSIS — Z23 ENCOUNTER FOR IMMUNIZATION: ICD-10-CM

## 2023-10-17 DIAGNOSIS — E66.09 CLASS 2 OBESITY DUE TO EXCESS CALORIES WITHOUT SERIOUS COMORBIDITY WITH BODY MASS INDEX (BMI) OF 35.0 TO 35.9 IN ADULT: ICD-10-CM

## 2023-10-17 DIAGNOSIS — F32.5 DEPRESSION, MAJOR, IN REMISSION (CMS-HCC): ICD-10-CM

## 2023-10-17 PROBLEM — R06.02 SHORTNESS OF BREATH: Status: RESOLVED | Noted: 2023-10-12 | Resolved: 2023-10-17

## 2023-10-17 PROCEDURE — G0008 ADMIN INFLUENZA VIRUS VAC: HCPCS | Performed by: INTERNAL MEDICINE

## 2023-10-17 PROCEDURE — 1036F TOBACCO NON-USER: CPT | Performed by: INTERNAL MEDICINE

## 2023-10-17 PROCEDURE — 90662 IIV NO PRSV INCREASED AG IM: CPT | Performed by: INTERNAL MEDICINE

## 2023-10-17 PROCEDURE — 1160F RVW MEDS BY RX/DR IN RCRD: CPT | Performed by: INTERNAL MEDICINE

## 2023-10-17 PROCEDURE — 3079F DIAST BP 80-89 MM HG: CPT | Performed by: INTERNAL MEDICINE

## 2023-10-17 PROCEDURE — 3074F SYST BP LT 130 MM HG: CPT | Performed by: INTERNAL MEDICINE

## 2023-10-17 PROCEDURE — 99213 OFFICE O/P EST LOW 20 MIN: CPT | Performed by: INTERNAL MEDICINE

## 2023-10-17 PROCEDURE — 1159F MED LIST DOCD IN RCRD: CPT | Performed by: INTERNAL MEDICINE

## 2023-10-17 ASSESSMENT — PATIENT HEALTH QUESTIONNAIRE - PHQ9
1. LITTLE INTEREST OR PLEASURE IN DOING THINGS: NOT AT ALL
SUM OF ALL RESPONSES TO PHQ9 QUESTIONS 1 AND 2: 0
2. FEELING DOWN, DEPRESSED OR HOPELESS: NOT AT ALL

## 2023-10-17 ASSESSMENT — ENCOUNTER SYMPTOMS
LOSS OF SENSATION IN FEET: 0
DEPRESSION: 0
OCCASIONAL FEELINGS OF UNSTEADINESS: 1

## 2023-10-17 NOTE — PROGRESS NOTES
"Subjective   Patient ID: Patty Oneil is a 77 y.o. female who presents for Follow-up (1 month follow-up on results).    HPI   77-year-old female with a past medical history of hypertension hyperlipidemia depression here for follow-up  Review of Systems  REVIEW OF SYSTEMS:  General:  Denies significant weight changes, fever, chills or weakness.  SKIN: Denies any rash or change in moles.  HEENT:  No vision or hearing changes. No headache. No vertigo, No tinnitus.   GI:  No loss of appetite. No change in bowel habit. No abdominal pain. No blood in stool.  GUR: No dysuria. No hematoma, No fever. No incontinence.  Respiratory:  No cough or shortness of breath.  CNS:  No memory or mood changes. No gait disturbance. No focal weakness. No tremors. No tingling or number of extremities.   ENDO: No cold intolerance. No fatigue.    Objective   /84 (BP Location: Left arm, Patient Position: Sitting, BP Cuff Size: Large adult)   Pulse 86   Temp 36.1 °C (97 °F)   Ht 1.676 m (5' 6\")   Wt 97.9 kg (215 lb 12.8 oz)   SpO2 95% Comment: RA  BMI 34.83 kg/m²     Physical Exam  OBJECTIVE:  Vital Signs:  Per TouchWorks.  Alert, oriented x3, not in distress.  Neck:  Supple.  No JVD.  Respiratory System:  Diminished breath sounds.  No wheezing and no rales.  Cardiovascular:  S1 and S2 positive.  No murmur.  Regular rate and rhythm.  Abdomen:  Soft.  Bowel sounds positive.  Liver and spleen not palpable.  Extremities:  Peripheral pulses present.  No edema.    Assessment/Plan     Recent COVID she recovered fully    Chronic shortness of breath follow-up with pulmonologist had a PFT done which can be seen    Hypertension stable advised DASH diet continue medications    Hyperlipidemia stable advised DASH diet lifestyle management diet exercise lose weight I will see her back in 1 week for wellness visit her complete blood work is due in May and we gave flu shot     "

## 2023-10-17 NOTE — PATIENT INSTRUCTIONS
How can I help Patty do this?  ---------------------------------------------  -BE PATIENT WITH Patty, remember it may take 10 times before they start to like new food. So, start with small bites and just keep trying.  -Serve at least one vegetable or fruit at every meal. Even try two. Remember, portions do not have to be as big as you think.  -Encourage eating fruits and vegetables instead of drinking them..it's a better way to get fiber and vitamins..so limit the amount of juice to 1/2 cup per day for children 1-6 years and one cup per day for children 7-18 years of age. Try using 1/2 part water and 1/2 part juice.    Spend less than two hours per day watching television and other screen media. Screen media includes video games, movies and computer use for entertainment.    How can I help Patty do this?  -Turn off the TV at dinner. Dinner is the best time to hang out with your kids and just talk, learn about their day, and tell them about your day. Your kids have a lot to learn from you and dinner is a great time to share.

## 2023-10-23 ENCOUNTER — OFFICE VISIT (OUTPATIENT)
Dept: PRIMARY CARE | Facility: CLINIC | Age: 78
End: 2023-10-23
Payer: MEDICARE

## 2023-10-23 VITALS
WEIGHT: 216.25 LBS | SYSTOLIC BLOOD PRESSURE: 132 MMHG | DIASTOLIC BLOOD PRESSURE: 84 MMHG | TEMPERATURE: 97.7 F | HEART RATE: 87 BPM | OXYGEN SATURATION: 96 % | HEIGHT: 66 IN | BODY MASS INDEX: 34.75 KG/M2

## 2023-10-23 DIAGNOSIS — E78.2 MIXED HYPERLIPIDEMIA: ICD-10-CM

## 2023-10-23 DIAGNOSIS — Z00.00 MEDICARE ANNUAL WELLNESS VISIT, SUBSEQUENT: Primary | ICD-10-CM

## 2023-10-23 DIAGNOSIS — F32.5 DEPRESSION, MAJOR, IN REMISSION (CMS-HCC): ICD-10-CM

## 2023-10-23 DIAGNOSIS — I10 ESSENTIAL HYPERTENSION: ICD-10-CM

## 2023-10-23 PROBLEM — R42 VERTIGO: Status: RESOLVED | Noted: 2023-10-12 | Resolved: 2023-10-23

## 2023-10-23 PROCEDURE — 1160F RVW MEDS BY RX/DR IN RCRD: CPT | Performed by: INTERNAL MEDICINE

## 2023-10-23 PROCEDURE — 1036F TOBACCO NON-USER: CPT | Performed by: INTERNAL MEDICINE

## 2023-10-23 PROCEDURE — 1159F MED LIST DOCD IN RCRD: CPT | Performed by: INTERNAL MEDICINE

## 2023-10-23 PROCEDURE — 3075F SYST BP GE 130 - 139MM HG: CPT | Performed by: INTERNAL MEDICINE

## 2023-10-23 PROCEDURE — 1170F FXNL STATUS ASSESSED: CPT | Performed by: INTERNAL MEDICINE

## 2023-10-23 PROCEDURE — 3079F DIAST BP 80-89 MM HG: CPT | Performed by: INTERNAL MEDICINE

## 2023-10-23 PROCEDURE — 3080F DIAST BP >= 90 MM HG: CPT | Performed by: INTERNAL MEDICINE

## 2023-10-23 PROCEDURE — G0439 PPPS, SUBSEQ VISIT: HCPCS | Performed by: INTERNAL MEDICINE

## 2023-10-23 ASSESSMENT — ACTIVITIES OF DAILY LIVING (ADL)
DOING_HOUSEWORK: INDEPENDENT
TAKING_MEDICATION: INDEPENDENT
MANAGING_FINANCES: INDEPENDENT
BATHING: INDEPENDENT
GROCERY_SHOPPING: INDEPENDENT
DRESSING: INDEPENDENT

## 2023-10-23 ASSESSMENT — PATIENT HEALTH QUESTIONNAIRE - PHQ9
2. FEELING DOWN, DEPRESSED OR HOPELESS: NOT AT ALL
1. LITTLE INTEREST OR PLEASURE IN DOING THINGS: NOT AT ALL
SUM OF ALL RESPONSES TO PHQ9 QUESTIONS 1 AND 2: 0

## 2023-10-23 NOTE — PROGRESS NOTES
Subjective   Reason for Visit: Patty Oneil is an 77 y.o. female here for a Medicare Wellness visit.               HPI  77-year-old female with a past medical history of depression hyperlipidemia hypertension here for annual wellness visit no other complaints  Patient Care Team:  Latoya Gonzalez MD as PCP - General  Latoya Gonzalez MD as PCP - [facility] Attributed Provider     Review of Systems  REVIEW OF SYSTEMS:  General:  Denies significant weight changes, fever, chills or weakness.  SKIN: Denies any rash or change in moles.  HEENT:  No vision or hearing changes. No headache. No vertigo, No tinnitus.   GI:  No loss of appetite. No change in bowel habit. No abdominal pain. No blood in stool.  GUR: No dysuria. No hematoma, No fever. No incontinence.  Respiratory:  No cough or shortness of breath.  CNS:  No memory or mood changes. No gait disturbance. No focal weakness. No tremors. No tingling or number of extremities.   ENDO: No cold intolerance. No fatigue.    Objective   Vitals:  There were no vitals taken for this visit.      Physical Exam  PHYSICAL EXAM LONG:  Vitals:  Per TouchWorks.  General Appearance:  Normal-built, well-nourished  with no apparent distress.  Skin:  Normal turgor.  No rash.  Head:  Normocephalic, atraumatic.  Eyes:  Pupils are equal, round, and reactive to light and accommodation.  Extraocular movements are intact.  No pallor of conjunctivae.  Mouth has moist oral mucosa.  Pharynx appears normal.  No erythema.  Nose:  Nasal mucosa normal.  Turbinates are within normal limits.  Ears:  Bilateral auditory ear canals are normal.  Bilateral tympanic membranes are normal and visible.  Neck:  Supple.  No JVD.  No carotid bruit.  No thyromegaly. No cervical lymphadenopathy.   Chest:  Bilaterally good air entry and bilaterally clear to auscultation.  No wheezing.  No crackles.  Heart:  Regular rate and rhythm.  S1, S2 positive.  No murmur.  Abdomen:  Soft and nontender.  Bowel  sounds are positive.  No organomegaly.  Extremities:  Bilaterally no pedal pitting edema.  Bilaterally 2+ dorsalis pedis pulses.  Neuro Exam:  Cranial nerves from II to XII intact.  No facial droop.  Tongue at midline.  Facial sensation intact to light touch and pain sensation.  Motor strength 5/5 in upper and lower extremities.  Sensation is grossly intact to light touch and pain sensation.  Deep tendon reflexes are bilaterally symmetric in upper and lower extremities and within normal limits, 2+.  No cerebellar signs.  Finger-to-nose intact.        Assessment/Plan   Problem List Items Addressed This Visit    None  Visit Diagnoses       Routine [facility] medical examination at [facility]    -  Primary Medicare annual wellness visit strongly advised lifestyle modification diet exercise lose weight exudations up-to-date she already have a living will    Depression stable advised to continue the same    Hyperlipidemia stable advised lifestyle [place] diet exercise continue same I will see her back in 6 months with repeat blood work    Hypertension stable advised DASH diet continue medications

## 2023-11-01 ENCOUNTER — TELEPHONE (OUTPATIENT)
Dept: PULMONOLOGY | Age: 78
End: 2023-11-01

## 2023-11-01 ENCOUNTER — OFFICE VISIT (OUTPATIENT)
Dept: PULMONOLOGY | Age: 78
End: 2023-11-01
Payer: MEDICARE

## 2023-11-01 VITALS
BODY MASS INDEX: 34.21 KG/M2 | DIASTOLIC BLOOD PRESSURE: 74 MMHG | OXYGEN SATURATION: 97 % | WEIGHT: 218 LBS | RESPIRATION RATE: 16 BRPM | HEIGHT: 67 IN | TEMPERATURE: 96.9 F | SYSTOLIC BLOOD PRESSURE: 128 MMHG | HEART RATE: 100 BPM

## 2023-11-01 DIAGNOSIS — G47.33 OSA (OBSTRUCTIVE SLEEP APNEA): ICD-10-CM

## 2023-11-01 DIAGNOSIS — J45.40 MODERATE PERSISTENT ASTHMA WITHOUT COMPLICATION: Primary | ICD-10-CM

## 2023-11-01 DIAGNOSIS — J45.40 MODERATE PERSISTENT ASTHMA WITHOUT COMPLICATION: ICD-10-CM

## 2023-11-01 DIAGNOSIS — E66.09 CLASS 1 OBESITY DUE TO EXCESS CALORIES WITHOUT SERIOUS COMORBIDITY WITH BODY MASS INDEX (BMI) OF 34.0 TO 34.9 IN ADULT: ICD-10-CM

## 2023-11-01 LAB
BASOPHILS # BLD: 0.1 K/UL (ref 0–0.2)
BASOPHILS NFR BLD: 0.8 %
EOSINOPHIL # BLD: 0.1 K/UL (ref 0–0.7)
EOSINOPHIL NFR BLD: 1.5 %
ERYTHROCYTE [DISTWIDTH] IN BLOOD BY AUTOMATED COUNT: 13.2 % (ref 11.5–14.5)
HCT VFR BLD AUTO: 41.2 % (ref 37–47)
HGB BLD-MCNC: 13.5 G/DL (ref 12–16)
LYMPHOCYTES # BLD: 2 K/UL (ref 1–4.8)
LYMPHOCYTES NFR BLD: 32.9 %
MCH RBC QN AUTO: 29.8 PG (ref 27–31.3)
MCHC RBC AUTO-ENTMCNC: 32.8 % (ref 33–37)
MCV RBC AUTO: 90.9 FL (ref 79.4–94.8)
MONOCYTES # BLD: 0.5 K/UL (ref 0.2–0.8)
MONOCYTES NFR BLD: 9 %
NEUTROPHILS # BLD: 3.3 K/UL (ref 1.4–6.5)
NEUTS SEG NFR BLD: 55.6 %
PLATELET # BLD AUTO: 259 K/UL (ref 130–400)
RBC # BLD AUTO: 4.53 M/UL (ref 4.2–5.4)
WBC # BLD AUTO: 6 K/UL (ref 4.8–10.8)

## 2023-11-01 PROCEDURE — 99214 OFFICE O/P EST MOD 30 MIN: CPT | Performed by: INTERNAL MEDICINE

## 2023-11-01 PROCEDURE — 1123F ACP DISCUSS/DSCN MKR DOCD: CPT | Performed by: INTERNAL MEDICINE

## 2023-11-01 PROCEDURE — G8427 DOCREV CUR MEDS BY ELIG CLIN: HCPCS | Performed by: INTERNAL MEDICINE

## 2023-11-01 PROCEDURE — 1036F TOBACCO NON-USER: CPT | Performed by: INTERNAL MEDICINE

## 2023-11-01 PROCEDURE — G8399 PT W/DXA RESULTS DOCUMENT: HCPCS | Performed by: INTERNAL MEDICINE

## 2023-11-01 PROCEDURE — G8484 FLU IMMUNIZE NO ADMIN: HCPCS | Performed by: INTERNAL MEDICINE

## 2023-11-01 PROCEDURE — G8417 CALC BMI ABV UP PARAM F/U: HCPCS | Performed by: INTERNAL MEDICINE

## 2023-11-01 PROCEDURE — 1090F PRES/ABSN URINE INCON ASSESS: CPT | Performed by: INTERNAL MEDICINE

## 2023-11-01 RX ORDER — FLUTICASONE PROPIONATE AND SALMETEROL XINAFOATE 115; 21 UG/1; UG/1
2 AEROSOL, METERED RESPIRATORY (INHALATION) 2 TIMES DAILY
Qty: 1 EACH | Refills: 3 | Status: SHIPPED | OUTPATIENT
Start: 2023-11-01

## 2023-11-01 NOTE — PROGRESS NOTES
Subjective:     Ban Montoya is a 68 y.o. female who complains today of:     Chief Complaint   Patient presents with    Follow-up     6 Week F/U for Dyspnea on exertion    Results     Bronchial Challenge       HPI  Patient presents for shortness of breath      11/1/2023  Doing better, she responded well to Spiriva, denies pain, no shortness of breath, no coughing, no lower extremity edema, no heartburn, no nasal congestion or postnasal drip. Weight is stable, she has not been using CPAP. 9/18/2023  Patient presents for evaluation of dyspnea on exertion, she reports shortness of breath on exertional activity, no coughing, no chest pain, no wheezing, no nighttime symptoms of coughing or shortness of breath, no lower extremity edema, she has chronic right lower extremity edema, weight is stable, no fever or chills, no nasal congestion or postnasal drip no heartburn. Allergies:  Patient has no known allergies. Past Medical History:   Diagnosis Date    Anxiety     Depression     Hyperlipidemia     Meniere's disease     CARLOS (obstructive sleep apnea)     Pulmonary hypertension (HCC)      Past Surgical History:   Procedure Laterality Date    BREAST BIOPSY Right     benign    BREAST LUMPECTOMY Right     benign    CARPAL TUNNEL RELEASE Right 5/3/2023    RIGHT WRIST CARPAL TUNNEL RELEASE performed by Fernando Van DO at [facility]      COLONOSCOPY  12/21/2015    w/polypectomy     HYSTERECTOMY (CERVIX STATUS UNKNOWN)      OVARY REMOVAL       Family History   Problem Relation Age of Onset    Uterine Cancer Mother     Brain Cancer Father     Breast Cancer Sister     Ovarian Cancer Maternal Grandfather     Colon Cancer Maternal Grandfather     Breast Cancer Maternal Cousin     Breast Cancer Maternal Cousin      Social History     Socioeconomic History    Marital status:       Spouse name: Not on file    Number of children: Not on file    Years of education: Not on file

## 2023-12-20 DIAGNOSIS — E78.2 MIXED HYPERLIPIDEMIA: ICD-10-CM

## 2023-12-20 RX ORDER — ATORVASTATIN CALCIUM 80 MG/1
80 TABLET, FILM COATED ORAL NIGHTLY
Qty: 90 TABLET | Refills: 1 | Status: SHIPPED | OUTPATIENT
Start: 2023-12-20

## 2024-01-16 DIAGNOSIS — I65.29 CAROTID ATHEROSCLEROSIS, UNSPECIFIED LATERALITY: Primary | ICD-10-CM

## 2024-01-16 RX ORDER — NITROGLYCERIN 0.4 MG/1
0.4 TABLET SUBLINGUAL EVERY 5 MIN PRN
Qty: 90 TABLET | Refills: 1 | Status: SHIPPED | OUTPATIENT
Start: 2024-01-16

## 2024-01-16 NOTE — TELEPHONE ENCOUNTER
10/23/2023    [id number] - [place], [place] - [address] AT [facility] [address]  [address] [place] [address]  Phone: [phone] Fax: [phone]    Next office visit none

## 2024-02-05 ENCOUNTER — OFFICE VISIT (OUTPATIENT)
Dept: PULMONOLOGY | Age: 79
End: 2024-02-05
Payer: MEDICARE

## 2024-02-05 VITALS
OXYGEN SATURATION: 96 % | TEMPERATURE: 97.1 F | HEIGHT: 67 IN | WEIGHT: 221.2 LBS | RESPIRATION RATE: 16 BRPM | SYSTOLIC BLOOD PRESSURE: 124 MMHG | BODY MASS INDEX: 34.72 KG/M2 | HEART RATE: 94 BPM | DIASTOLIC BLOOD PRESSURE: 74 MMHG

## 2024-02-05 DIAGNOSIS — J45.40 MODERATE PERSISTENT ASTHMA WITHOUT COMPLICATION: Primary | ICD-10-CM

## 2024-02-05 DIAGNOSIS — E66.09 CLASS 1 OBESITY DUE TO EXCESS CALORIES WITHOUT SERIOUS COMORBIDITY WITH BODY MASS INDEX (BMI) OF 34.0 TO 34.9 IN ADULT: ICD-10-CM

## 2024-02-05 DIAGNOSIS — G47.33 OSA (OBSTRUCTIVE SLEEP APNEA): ICD-10-CM

## 2024-02-05 DIAGNOSIS — J45.40 MODERATE PERSISTENT ASTHMA WITHOUT COMPLICATION: ICD-10-CM

## 2024-02-05 PROCEDURE — 1036F TOBACCO NON-USER: CPT | Performed by: INTERNAL MEDICINE

## 2024-02-05 PROCEDURE — G8427 DOCREV CUR MEDS BY ELIG CLIN: HCPCS | Performed by: INTERNAL MEDICINE

## 2024-02-05 PROCEDURE — 1123F ACP DISCUSS/DSCN MKR DOCD: CPT | Performed by: INTERNAL MEDICINE

## 2024-02-05 PROCEDURE — G8484 FLU IMMUNIZE NO ADMIN: HCPCS | Performed by: INTERNAL MEDICINE

## 2024-02-05 PROCEDURE — 1090F PRES/ABSN URINE INCON ASSESS: CPT | Performed by: INTERNAL MEDICINE

## 2024-02-05 PROCEDURE — G8399 PT W/DXA RESULTS DOCUMENT: HCPCS | Performed by: INTERNAL MEDICINE

## 2024-02-05 PROCEDURE — 99213 OFFICE O/P EST LOW 20 MIN: CPT | Performed by: INTERNAL MEDICINE

## 2024-02-05 PROCEDURE — G8417 CALC BMI ABV UP PARAM F/U: HCPCS | Performed by: INTERNAL MEDICINE

## 2024-02-05 NOTE — PROGRESS NOTES
Subjective:     Wendy Holm is a 78 y.o. female who complains today of:     Chief Complaint   Patient presents with    Follow-up     3 Month F/U for Moderate persistent asthma and CARLOS        HPI  Patient presents for shortness of breath  2/5/2024  Doing better, symptoms well-controlled, she responded well to Ativan, no coughing, no chest pain, no shortness of breath, no lower extremity edema, no heartburn.  She does not wish to consider repeat sleep study or CPAP device  She is agreeable to do nocturnal pulse oximetry and if needed nocturnal O2    11/1/2023  Doing better, she responded well to Spiriva, denies pain, no shortness of breath, no coughing, no lower extremity edema, no heartburn, no nasal congestion or postnasal drip.  Weight is stable, she has not been using CPAP.      9/18/2023  Patient presents for evaluation of dyspnea on exertion, she reports shortness of breath on exertional activity, no coughing, no chest pain, no wheezing, no nighttime symptoms of coughing or shortness of breath, no lower extremity edema, she has chronic right lower extremity edema, weight is stable, no fever or chills, no nasal congestion or postnasal drip no heartburn.           Allergies:  Patient has no known allergies.  Past Medical History:   Diagnosis Date    Anxiety     Depression     Hyperlipidemia     Meniere's disease     CARLOS (obstructive sleep apnea)     Pulmonary hypertension (HCC)      Past Surgical History:   Procedure Laterality Date    BREAST BIOPSY Right     benign    BREAST LUMPECTOMY Right     benign    CARPAL TUNNEL RELEASE Right 5/3/2023    RIGHT WRIST CARPAL TUNNEL RELEASE performed by Jamie Jean DO at [facility]    CHOLECYSTECTOMY      COLONOSCOPY  12/21/2015    w/polypectomy     HYSTERECTOMY (CERVIX STATUS UNKNOWN)      OVARY REMOVAL       Family History   Problem Relation Age of Onset    Uterine Cancer Mother     Brain Cancer Father     Breast Cancer Sister     Ovarian Cancer Maternal

## 2024-02-08 LAB
A ALTERNATA IGE QN: <0.1 KU/L (ref 0–0.34)
A FUMIGATUS IGE QN: <0.1 KU/L (ref 0–0.34)
AMER SYCAMORE IGE QN: <0.1 KU/L (ref 0–0.34)
BERMUDA GRASS IGE QN: <0.1 KU/L (ref 0–0.34)
BOXELDER IGE QN: <0.1 KU/L (ref 0–0.34)
C SPHAEROSPERMUM IGE QN: <0.1 KUL/L (ref 0–0.34)
CALIF WALNUT IGE QN: <0.1 KU/L (ref 0–0.34)
CAT DANDER IGE QN: <0.1 KU/L (ref 0–0.34)
CMN PIGWEED IGE QN: <0.1 KU/L (ref 0–0.34)
COMMON RAGWEED IGE QN: <0.1 KU/L (ref 0–0.34)
COTTONWOOD IGE QN: <0.1 KU/L (ref 0–0.34)
D FARINAE IGE QN: <0.1 KU/L (ref 0–0.34)
D PTERONYSS IGE QN: <0.1 KU/L (ref 0–0.34)
DOG DANDER IGE QN: <0.1 KU/L (ref 0–0.34)
IGE SERPL-ACNC: 34 IU/ML
M RACEMOSUS IGE QN: <0.1 KU/L (ref 0–0.34)
MOUSE EPITH IGE QN: <0.1 KU/L (ref 0–0.34)
P NOTATUM IGE QN: <0.1 KU/L (ref 0–0.34)
PECAN/HICK TREE IGE QN: <0.1 KU/L (ref 0–0.34)
RED CEDAR IGE QN: <0.1 KU/L (ref 0–0.34)
ROACH IGE QN: <0.1 KU/L (ref 0–0.34)
SALTWORT IGE QN: <0.1 KU/L (ref 0–0.34)
SHEEP SORREL IGE QN: <0.1 KU/L (ref 0–0.34)
SILVER BIRCH IGE QN: <0.1 KU/L (ref 0–0.34)
TIMOTHY IGE QN: <0.1 KU/L (ref 0–0.34)
WHITE ASH IGE QN: <0.1 KU/L (ref 0–0.34)
WHITE ELM IGE QN: <0.1 KU/L (ref 0–0.34)
WHITE MULBERRY IGE QN: <0.1 KU/L (ref 0–0.34)
WHITE OAK IGE QN: <0.1 KU/L (ref 0–0.34)

## 2024-02-25 LAB
ARTIFACT EVENTS (PULSE): NORMAL
ARTIFACT EVENTS: NORMAL
AVERAGE PULSE: NORMAL
AWAKE SPO2: NORMAL
BASAL SPO2: NORMAL
BRADYCARDIA TIME: NORMAL
DELTA SPO2: NORMAL
HIGH PULSE: NORMAL
HIGH SPO2: NORMAL
LOW PULSE: NORMAL
LOW SPO2: NORMAL
OXYGEN DESATURATION EVENTS (3%): NORMAL
OXYGEN DESATURATION INDEX (ODI): NORMAL
PERCENT TIME IN BRADYCARDIA: NORMAL
PERCENT TIME IN TACHYCARDIA: NORMAL
TACHYCARDIA TIME: NORMAL
TIME <= 88%: NORMAL
TIME <= 89%: NORMAL
TIME CONSECUTIVE <= 88%: NORMAL

## 2024-03-01 DIAGNOSIS — J45.40 MODERATE PERSISTENT ASTHMA WITHOUT COMPLICATION: ICD-10-CM

## 2024-03-08 ENCOUNTER — TELEPHONE (OUTPATIENT)
Dept: PRIMARY CARE | Facility: CLINIC | Age: 79
End: 2024-03-08
Payer: MEDICARE

## 2024-03-08 DIAGNOSIS — N64.4 BREAST PAIN IN FEMALE: Primary | ICD-10-CM

## 2024-03-08 NOTE — TELEPHONE ENCOUNTER
Patient called office advised that she has been experiencing Pain right breast x's 2 weeks she scheduled a follow up with Lara Bingham MD however she would like to know if she can have an order for a right diagnostic mammogram prior to being seen

## 2024-03-11 DIAGNOSIS — N64.4 BREAST PAIN, RIGHT: Primary | ICD-10-CM

## 2024-03-28 ENCOUNTER — HOSPITAL ENCOUNTER (OUTPATIENT)
Dept: RADIOLOGY | Facility: HOSPITAL | Age: 79
Discharge: HOME | End: 2024-03-28
Payer: MEDICARE

## 2024-03-28 VITALS — WEIGHT: 209 LBS | HEIGHT: 66 IN | BODY MASS INDEX: 33.59 KG/M2

## 2024-03-28 DIAGNOSIS — N64.4 BREAST PAIN IN FEMALE: ICD-10-CM

## 2024-03-28 DIAGNOSIS — N64.4 BREAST PAIN, RIGHT: ICD-10-CM

## 2024-03-28 PROCEDURE — 77062 BREAST TOMOSYNTHESIS BI: CPT

## 2024-03-28 PROCEDURE — G0279 TOMOSYNTHESIS, MAMMO: HCPCS | Performed by: RADIOLOGY

## 2024-03-28 PROCEDURE — 77066 DX MAMMO INCL CAD BI: CPT | Performed by: RADIOLOGY

## 2024-03-29 ENCOUNTER — APPOINTMENT (OUTPATIENT)
Dept: PRIMARY CARE | Facility: CLINIC | Age: 79
End: 2024-03-29
Payer: MEDICARE

## 2024-04-01 ENCOUNTER — TELEPHONE (OUTPATIENT)
Dept: PULMONOLOGY | Age: 79
End: 2024-04-01

## 2024-04-02 DIAGNOSIS — G47.34 NOCTURNAL HYPOXIA: Primary | ICD-10-CM

## 2024-04-12 ENCOUNTER — TELEPHONE (OUTPATIENT)
Dept: CARDIOLOGY | Facility: CLINIC | Age: 79
End: 2024-04-12

## 2024-04-12 ENCOUNTER — OFFICE VISIT (OUTPATIENT)
Dept: PRIMARY CARE | Facility: CLINIC | Age: 79
End: 2024-04-12
Payer: COMMERCIAL

## 2024-04-12 VITALS
WEIGHT: 208.8 LBS | HEIGHT: 66 IN | SYSTOLIC BLOOD PRESSURE: 122 MMHG | TEMPERATURE: 97.4 F | BODY MASS INDEX: 33.56 KG/M2 | OXYGEN SATURATION: 96 % | DIASTOLIC BLOOD PRESSURE: 84 MMHG | HEART RATE: 79 BPM

## 2024-04-12 DIAGNOSIS — N39.43 POST-VOID DRIBBLING: ICD-10-CM

## 2024-04-12 DIAGNOSIS — I10 ESSENTIAL HYPERTENSION: ICD-10-CM

## 2024-04-12 DIAGNOSIS — Z72.0 TOBACCO USE: ICD-10-CM

## 2024-04-12 DIAGNOSIS — Z00.00 HEALTHCARE MAINTENANCE: ICD-10-CM

## 2024-04-12 DIAGNOSIS — E55.9 VITAMIN D DEFICIENCY: ICD-10-CM

## 2024-04-12 DIAGNOSIS — N39.42 URINARY INCONTINENCE WITHOUT SENSORY AWARENESS: Primary | ICD-10-CM

## 2024-04-12 DIAGNOSIS — E78.2 MIXED HYPERLIPIDEMIA: ICD-10-CM

## 2024-04-12 DIAGNOSIS — F32.5 DEPRESSION, MAJOR, IN REMISSION (CMS-HCC): ICD-10-CM

## 2024-04-12 PROCEDURE — 3079F DIAST BP 80-89 MM HG: CPT | Performed by: FAMILY MEDICINE

## 2024-04-12 PROCEDURE — 1036F TOBACCO NON-USER: CPT | Performed by: FAMILY MEDICINE

## 2024-04-12 PROCEDURE — 1157F ADVNC CARE PLAN IN RCRD: CPT | Performed by: FAMILY MEDICINE

## 2024-04-12 PROCEDURE — 3074F SYST BP LT 130 MM HG: CPT | Performed by: FAMILY MEDICINE

## 2024-04-12 PROCEDURE — 99214 OFFICE O/P EST MOD 30 MIN: CPT | Performed by: FAMILY MEDICINE

## 2024-04-12 PROCEDURE — 1159F MED LIST DOCD IN RCRD: CPT | Performed by: FAMILY MEDICINE

## 2024-04-12 ASSESSMENT — ENCOUNTER SYMPTOMS: FREQUENCY: 1

## 2024-04-12 NOTE — PROGRESS NOTES
"Subjective   Chief complaint: Patty Oneil is a 78 y.o. female who presents for Urinary Frequency (Patient is in office today to discuss Urinary incontinence. Patient discussed this with KR. Patient would like to follow-up.  ).    HPI:  Here with a concern of urinary incontinence.  She has difficulty with some urinary retention after voiding, and with leaking further after changing positions.  This is bothersome for her.      Social History:  Now retired.   passed away in 2018.  Uses a cane if she feels unsteady.  Has a walker available if needed.  On Gabapentin for neuropathy; given to her by neurology.    Has been losing weight slowly.  Down 8 pounds since October 2023.  Intermittent fasting 8/16.  Trying to eat healthy and stay active.    Urinary Frequency   Associated symptoms include frequency.       Objective   /84 (BP Location: Right arm, Patient Position: Sitting, BP Cuff Size: Large adult)   Pulse 79   Temp 36.3 °C (97.4 °F) (Temporal)   Ht 1.675 m (5' 5.95\")   Wt 94.7 kg (208 lb 12.8 oz)   SpO2 96% Comment: RA  BMI 33.75 kg/m²   Physical Exam  General:  Alert, oriented, no acute distress  Eyes:  Sclerae white, PER, conjunctivae clear  ENT:  No nasal congestion.    Neck: Supple  Endocrine:  No thyromegaly. No thyroid nodes.   Respiratory:  Normal breath sounds.  No wheezing, rhonchi nor crackles.  No dyspnea.  Cardiovascular:  S1 and S2 positive.  Regular rate and rhythm.  No gallops.  No murmurs.  Vascular:  1+ edema bilateral feet / ankles.   CNS:  No gross neurological deficits.  Gait within normal limits.    Psychiatric:  Affect is positive and appropriate.  No depression.  No anxiety.    Review of Systems   Genitourinary:  Positive for frequency.      I have reviewed and reconciled the medication list with the patient today.   Current Outpatient Medications:     aspirin 81 mg EC tablet, Take 1 tablet (81 mg) by mouth once daily., Disp: , Rfl:     atorvastatin (Lipitor) 80 mg " tablet, Take 1 tablet (80 mg) by mouth once daily at bedtime., Disp: 90 tablet, Rfl: 1    biotin 10,000 mcg capsule, Take 1 capsule (10 mg) by mouth once daily., Disp: , Rfl:     escitalopram (Lexapro) 10 mg tablet, Take 1 tablet (10 mg) by mouth 2 times a day., Disp: , Rfl:     gabapentin (Neurontin) 100 mg capsule, Take 3 capsules (300 mg) by mouth once daily at bedtime. At bedtime, Disp: , Rfl:     meclizine (Antivert) 25 mg tablet, Take 1 tablet (25 mg) by mouth 3 times a day as needed., Disp: , Rfl:     metoprolol tartrate (Lopressor) 25 mg tablet, TAKE 1 TABLET BY MOUTH TWICE DAILY, Disp: 180 tablet, Rfl: 0    nitroglycerin (Nitrostat) 0.4 mg SL tablet, Place 1 tablet (0.4 mg) under the tongue every 5 minutes if needed for chest pain. Up to 3 doses as needed for chest pain. Call 911 if pain persists., Disp: 90 tablet, Rfl: 1    omeprazole (PriLOSEC) 20 mg DR capsule, Take 1 capsule (20 mg) by mouth once daily in the morning. Take before meals., Disp: , Rfl:     ondansetron ODT (Zofran-ODT) 4 mg disintegrating tablet, Take 1 tablet (4 mg) by mouth every 8 hours if needed for nausea., Disp: , Rfl:     oxygen (O2) gas therapy, Inhale 1 each continuously. 2L at bedtime, Disp: , Rfl:     rOPINIRole (Requip) 1 mg tablet, Take 1 tablet (1 mg) by mouth 3 times a day., Disp: , Rfl:     triamterene-hydrochlorothiazid (Maxzide-25) 37.5-25 mg tablet, Take 1 tablet by mouth 2 times a day., Disp: , Rfl:      Imaging:  BI mammo bilateral diagnostic tomosynthesis    Result Date: 3/28/2024  Interpreted By:  Terrell Najera, STUDY: BI MAMMO BILATERAL DIAGNOSTIC TOMOSYNTHESIS;  3/28/2024 1:30 pm   ACCESSION NUMBER(S): [id number]   ORDERING CLINICIAN: FAUSTO HAWTHORNE   INDICATION: Signs/Symptoms:breast pain right side   COMPARISON: 12/05/2022   FINDINGS: 2D and tomosynthesis images were reviewed at 1 mm slice thickness.   Density:  There are areas of scattered fibroglandular tissue.   There are stable areas of asymmetry and  nodularity bilaterally. Biopsy clip noted within the right breast in the retroareolar region. No new suspicious masses or calcifications are identified. No new areas of architectural distortion.       No mammographic evidence of malignancy.   BI-RADS CATEGORY:   BI-RADS Category:  1 Negative. Recommendation:  Annual Screening. Recommended Date:  1 Year. Laterality:  Bilateral.   For any future breast imaging appointments, please call [phone] ([phone]).     MACRO: None   Signed by: Terrell Najera 3/28/2024 1:46 PM Dictation workstation:   [id number]       Labs reviewed:    Lab Results   Component Value Date    WBC 4.7 05/15/2023    HGB 13.1 05/15/2023    HCT 39.8 05/15/2023     05/15/2023    CHOL 268 (H) 05/15/2023    TRIG 128 05/15/2023    HDL 58.2 05/15/2023    ALT 15 05/15/2023    AST 15 05/15/2023     05/15/2023    K 4.0 05/15/2023     05/15/2023    CREATININE 0.76 05/15/2023    BUN 19 05/15/2023    CO2 26 05/15/2023    TSH 3.23 05/15/2023    INR 0.9 06/30/2022    HGBA1C 5.7 (A) 05/15/2023       Assessment/Plan   Problem List Items Addressed This Visit       Depression, major, in remission (CMS-HCC)     Doing well.  No changes needed at this time.         Essential hypertension     Well-controlled.  Continue present management.         Relevant Orders    CBC and Auto Differential    TSH with reflex to Free T4 if abnormal    Hyperlipidemia     Lab order on chart to reassess.         Relevant Orders    Comprehensive Metabolic Panel    Lipid Panel    Post-void dribbling     Referral given.         Relevant Orders    Referral to Urogynecology    Urinary incontinence without sensory awareness - Primary     Referral to Urogynecology given.         Relevant Orders    Referral to Urogynecology    Vitamin D deficiency    Relevant Orders    Vitamin D 25-Hydroxy,Total (for eval of Vitamin D levels)     Other Visit Diagnoses       Healthcare maintenance        Relevant Orders    Hepatitis C Antibody     Tobacco use                Continue current medications as listed  Follow up in 3-6 months.  Sooner if needed.

## 2024-04-12 NOTE — TELEPHONE ENCOUNTER
Hello referral# [id number] to see Sandrine Rogers for 10 visits valid 4/12/24--4/11/25 PER DEVOTED/AVAILITY PORTAL

## 2024-04-30 ENCOUNTER — OFFICE VISIT (OUTPATIENT)
Dept: OBSTETRICS AND GYNECOLOGY | Facility: CLINIC | Age: 79
End: 2024-04-30
Payer: COMMERCIAL

## 2024-04-30 VITALS
WEIGHT: 203 LBS | DIASTOLIC BLOOD PRESSURE: 76 MMHG | HEIGHT: 66 IN | BODY MASS INDEX: 32.62 KG/M2 | SYSTOLIC BLOOD PRESSURE: 140 MMHG

## 2024-04-30 DIAGNOSIS — N39.43 POST-VOID DRIBBLING: ICD-10-CM

## 2024-04-30 DIAGNOSIS — N32.81 OAB (OVERACTIVE BLADDER): Primary | ICD-10-CM

## 2024-04-30 DIAGNOSIS — N39.42 URINARY INCONTINENCE WITHOUT SENSORY AWARENESS: ICD-10-CM

## 2024-04-30 LAB
POC APPEARANCE, URINE: CLEAR
POC BILIRUBIN, URINE: NEGATIVE
POC BLOOD, URINE: NEGATIVE
POC COLOR, URINE: YELLOW
POC GLUCOSE, URINE: NEGATIVE MG/DL
POC KETONES, URINE: ABNORMAL MG/DL
POC LEUKOCYTES, URINE: NEGATIVE
POC NITRITE,URINE: NEGATIVE
POC PH, URINE: 6 PH
POC PROTEIN, URINE: NEGATIVE MG/DL
POC SPECIFIC GRAVITY, URINE: 1.01
POC UROBILINOGEN, URINE: 0.2 EU/DL

## 2024-04-30 PROCEDURE — 3077F SYST BP >= 140 MM HG: CPT | Performed by: STUDENT IN AN ORGANIZED HEALTH CARE EDUCATION/TRAINING PROGRAM

## 2024-04-30 PROCEDURE — 1157F ADVNC CARE PLAN IN RCRD: CPT | Performed by: STUDENT IN AN ORGANIZED HEALTH CARE EDUCATION/TRAINING PROGRAM

## 2024-04-30 PROCEDURE — 99204 OFFICE O/P NEW MOD 45 MIN: CPT | Performed by: STUDENT IN AN ORGANIZED HEALTH CARE EDUCATION/TRAINING PROGRAM

## 2024-04-30 PROCEDURE — 1036F TOBACCO NON-USER: CPT | Performed by: STUDENT IN AN ORGANIZED HEALTH CARE EDUCATION/TRAINING PROGRAM

## 2024-04-30 PROCEDURE — 81003 URINALYSIS AUTO W/O SCOPE: CPT | Performed by: STUDENT IN AN ORGANIZED HEALTH CARE EDUCATION/TRAINING PROGRAM

## 2024-04-30 PROCEDURE — 1126F AMNT PAIN NOTED NONE PRSNT: CPT | Performed by: STUDENT IN AN ORGANIZED HEALTH CARE EDUCATION/TRAINING PROGRAM

## 2024-04-30 PROCEDURE — 1159F MED LIST DOCD IN RCRD: CPT | Performed by: STUDENT IN AN ORGANIZED HEALTH CARE EDUCATION/TRAINING PROGRAM

## 2024-04-30 PROCEDURE — 3078F DIAST BP <80 MM HG: CPT | Performed by: STUDENT IN AN ORGANIZED HEALTH CARE EDUCATION/TRAINING PROGRAM

## 2024-04-30 RX ORDER — MIRABEGRON 25 MG/1
25 TABLET, FILM COATED, EXTENDED RELEASE ORAL DAILY
Qty: 30 TABLET | Refills: 11 | Status: SHIPPED | OUTPATIENT
Start: 2024-04-30 | End: 2025-04-30

## 2024-04-30 ASSESSMENT — PAIN SCALES - GENERAL: PAINLEVEL: 0-NO PAIN

## 2024-04-30 NOTE — PATIENT INSTRUCTIONS
"Here are some general tips to help promote a healthy bladder:    1. Drink water to make sure you don't get dehydrated, but don't over-do it. The general recommendation is 6-8 glasses of water per day, but you can also go based on the color of your urine. If your urine is a darker orange color, you are likely not drinking enough. And vice versa if your urine is clear like water, you may be over-hydrating.  2. Common bladder irritants include caffeine, alcohol, spicy foods, and acidic foods. This does not mean you need to completely cut these out of your diet, but just know that if you ingest any of these, you may feel like you need to urinate frequently and may have trouble holding your urine.   3. Avoid drinking any fluids 2-3 hours before you go to bed, otherwise you will likely wake up at night with the urge to urinate. \"What goes in must come out\"! Similarly, avoid drinking fluids in the middle of the night.  4. If you find yourself having a frequent urge to urinate, don't run to use the toilet. Instead, try to sit calmly, breathe deeply, and do 10 rapid kegels (squeeze your pelvic floor muscles as if you are trying to stop your urine stream). Then when the urge subsides, calmly get up and walk to use the toilet.   5. If you feel that you are not fully emptying your bladder, do not strain to urinate. While on the toilet, sit calmly, breathe deeply and try to relax your pelvic floor muscles to let the urine come out. You can also try shifting positions (e.g. leaning to one side or the other) to see if that helps.  6. Avoid constipation! The bladder and bowels have overlapping nerve supplies and issues with one can definitely affect the other. If you need to, you can use Miralax to help keep the stools soft and regular.  7. Eat a healthy balanced diet and do at least 20-30 minutes of exercise (even just walking!) 5 days a week    A great resource to learn more is yourpelvicfloor.org   "

## 2024-04-30 NOTE — PROGRESS NOTES
Referred by: Dr. Bingham    PCP  Lara Bingham MD         CHIEF COMPLAINT:  UUI, frequency         HISTORY OF PRESENT ILLNESS:  This is a  78 y.o. y.o. female who presents with urinary frequency and incontinence. Has to sit for a while to fully empty. When she stands she dribbles enough to have to change a pad again. If she doesn't get up right away with urge will get leakage.    Tried bladder training with PCP.    Drinking lots of water with intermittent fasting diet.     The following were reviewed to gain additional history:  External notes: Dr. Quiñones note re: urinary incontinence, frequency  Test results: A1c 5.7% 5/15/23         Specifically, she describes the following pelvic floor symptoms:          Prolapse: No       - Splinting to urinate: No       - Splinting for bowel movement/stool trapping: No              Incontinence:  Yes             Urge              Urinary Symptoms:       - Frequency:  Yes             # Voids:         - Nocturia: Yes             # Voids:  2-3 times per night       - Urgency:  Yes       - Incomplete emptying:  Yes - see above       - Hesitancy:  No       - Pain with voiding:  No       - Excessive fluid intake: Yes - at least 64 oz daily               History:       - Recurrent UTI:  No       - Hematuria:  No       - Stones:  No       - Kidney Disease:  No                  Bowel Symptoms:       - Regular: Yes       - Diarrhea:No       - Constipation: No       - Fecal Incontinence:  No       - Flatus Incontinence:  No       - Fecal urgency:   No    Past medical and surgical hx reviewed - pertinent for  ESMER/BSO (age 38 for polyps and AUB), appendectomy and cholecystectomy, lumpectomy, peripheral neuropathy  Smoking history: quit 1986        Gyn History:  - Menopausal: Yes           Postmenopausal bleeding: No  - HRT: Yes - pill/patch after ESMER/BSO but stopped after lumptectomy  - Pap up to date: No   History of abnormal pap: No  - Sexually active:  No  -  Number of prior vaginal deliveries: 3   Number of prior operative deliveries: FAVD x 1   Prior OASI? none  - Number of prior c-sections: 0    - Mammogram up to date: Yes  - Colonoscopy up to date: Yes    OB History    No obstetric history on file.               PHYSICAL EXAMINATION:  No LMP recorded. Patient is postmenopausal.  There is no height or weight on file to calculate BMI.  There were no vitals taken for this visit.  General Appearance: well appearing  Neuro: Alert and oriented   HEENT: mucous membranes moist, neck supple  Resp: No respiratory distress, normal work of breathing  MSK: normal range of motion, gait appropriate    Pelvic: deferred    PVR (by Ultrasound): 13 ml     IMPRESSION AND PLAN:  Patty Oneil is a 78 y.o. who presents with OAB, UUI.    OAB  - discussed etiology of OAB and potential management options  - reviewed bladder health recommendations (fluid intake, avoiding bladder triggers)  - discussed treatment options: PFPT, medications, PTNS, intradetrusor botox, SNM  - Amenable to proceeding with medication therapy  - Rx sent to preferred pharmacy for myrbetriq 25 mg daily  - reviewed possibility of mild blood pressure elevation with myrbetriq  - if cost-prohibitive, advised patient to call office back and we can send in alternative: trospium 20 mg BID  - follow up in 1 month for medication check with Jazmyne Plata NP    All questions and concerns were answered and addressed.  The patient expressed understanding and agrees with the plan.     4/30/2024     Sandrine Rogers MD

## 2024-05-03 ENCOUNTER — TELEPHONE (OUTPATIENT)
Dept: PRIMARY CARE | Facility: CLINIC | Age: 79
End: 2024-05-03
Payer: COMMERCIAL

## 2024-05-03 DIAGNOSIS — G60.9 IDIOPATHIC PERIPHERAL NEUROPATHY: Primary | ICD-10-CM

## 2024-05-03 NOTE — TELEPHONE ENCOUNTER
Pt called to report Insurance change.     Now needs an entire NEW prior authorization for Neurology Referral due to change of insurance companies.  Pt has pending appt. with Dr. Amanda Malloy on 5- Dx: Peripheral Neuropathy

## 2024-05-24 ENCOUNTER — APPOINTMENT (OUTPATIENT)
Dept: NEUROLOGY | Facility: CLINIC | Age: 79
End: 2024-05-24
Payer: COMMERCIAL

## 2024-06-03 ENCOUNTER — APPOINTMENT (OUTPATIENT)
Dept: OBSTETRICS AND GYNECOLOGY | Facility: CLINIC | Age: 79
End: 2024-06-03
Payer: COMMERCIAL

## 2024-06-13 NOTE — MR AVS SNAPSHOT
After Visit Summary             Michael Granda   2017 3:30 PM   Office Visit    Description:  Female : 1945   Provider:  Trista Fong MD   Department:  [facility]              Your Follow-Up and Future Appointments         Below is a list of your follow-up and future appointments. This may not be a complete list as you may have made appointments directly with providers that we are not aware of or your providers may have made some for you. Please call your providers to confirm appointments. It is important to keep your appointments. Please bring your current insurance card, photo ID, co-pay, and all medication bottles to your appointment. If self-pay, payment is expected at the time of service. Your To-Do List     Future Appointments Provider Department Dept Phone    2017 1:30 PM Trista Fong MD [facility] [phone]    Please arrive 15 minutes prior to appointment time, bring insurance card and photo ID. Follow-Up    Return in about 2 months (around 2017) for after starting CPAP. Information from Your Visit        Department     Name Address Phone Fax    [facility] [address] [place] [address] [phone]      You Were Seen for:         Comments    Obstructive sleep apnea   [[id number]]         Vital Signs     Blood Pressure Pulse Temperature Respirations Height Weight    134/88 (Site: Right Arm, Position: Sitting, Cuff Size: Medium Adult) 80 97.6 °F (36.4 °C) (Tympanic) 18 5' 5.5\" (1.664 m) 215 lb (97.5 kg)    Oxygen Saturation Body Mass Index Smoking Status             98% 35.23 kg/m2 Never Smoker         Additional Information about your Body Mass Index (BMI)           Your BMI as listed above is considered obese (30 or more). BMI is an estimate of body fat, calculated from your height and weight.   The higher your BMI, the greater your risk of heart disease, high blood pressure, Detail Level: Detailed Zoster Vaccine 12/4/2005    Pneumococcal Vaccines (two) for all adults aged 72 and over (1 of 2 - PCV13) 12/4/2010    Yearly Flu Vaccine (1) 9/1/2017    Cholesterol Screening 9/27/2017    Mammograms are recommended every 2 years for low/average risk patients aged 48 - 69, and every year for high risk patients per updated national guidelines. However these guidelines can be individualized by your provider. 6/6/2019    Colonoscopy 12/21/2025            [employer] Signup           [employer]ient allows you to send messages to your doctor, view your test results, renew your prescriptions, schedule appointments, view visit notes, and more. How Do I Sign Up? 1. In your Internet browser, go to [email or web address]  2. Click on the Sign Up Now link in the Sign In box. You will see the New Member Sign Up page. 3. Enter your Verient Access Code exactly as it appears below. You will not need to use this code after youve completed the sign-up process. If you do not sign up before the expiration date, you must request a new code. Verient Access Code: [email or web address]  Expires: 10/14/2017 11:21 AM    4. Enter your Social Security Number ([id number]) and Date of Birth (mm/dd/yyyy) as indicated and click Submit. You will be taken to the next sign-up page. 5. Create a Verient ID. This will be your Verient login ID and cannot be changed, so think of one that is secure and easy to remember. 6. Create a Verient password. You can change your password at any time. 7. Enter your Password Reset Question and Answer. This can be used at a later time if you forget your password. 8. Enter your e-mail address. You will receive e-mail notification when new information is available in [address] 9. Click Sign Up. You can now view your medical record. Additional Information  If you have questions, please contact the physician practice where you receive care. Remember, Verient is NOT to be used for urgent needs.  For Depth Of Biopsy: dermis medical emergencies, dial 911. For questions regarding your [employer] account call [phone]. If you have a clinical question, please call your doctor's office. Was A Bandage Applied: Yes Size Of Lesion In Cm: 0 Biopsy Type: H and E Biopsy Method: Personna blade Anesthesia Type: 1% lidocaine with epinephrine Anesthesia Volume In Cc: 0.5 Hemostasis: Aluminum Chloride Wound Care: Petrolatum Dressing: bandage Destruction After The Procedure: No Type Of Destruction Used: Electrodesiccation Curettage Text: The wound bed was treated with curettage after the biopsy was performed. Cryotherapy Text: The wound bed was treated with cryotherapy after the biopsy was performed. Electrodesiccation Text: The wound bed was treated with electrodesiccation after the biopsy was performed. Electrodesiccation And Curettage Text: The wound bed was treated with electrodesiccation and curettage after the biopsy was performed. Silver Nitrate Text: The wound bed was treated with silver nitrate after the biopsy was performed. Lab: [id number] Lab Facility: [id number] Path Notes (To The Dermatopathologist): Supernumerary nipple vs [place] Consent: Written consent was obtained and risks were reviewed including but not limited to scarring, infection, bleeding, scabbing, incomplete removal, nerve damage and allergy to anesthesia. Post-Care Instructions: I reviewed with the patient in detail post-care instructions. Patient is to keep the bandage in place for 24 hours, then wash gently with soap and water. Rinse and pat dry. Apply petroleum jelly and a bandage. Continue wound care until healed. Notification Instructions: Patient will be notified of biopsy results. However, patient instructed to call the office if not contacted within 2 weeks. Billing Type: Third-Party Bill Information: Selecting Yes will display possible errors in your note based on the variables you have selected. This validation is only offered as a suggestion for you. PLEASE NOTE THAT THE VALIDATION TEXT WILL BE REMOVED WHEN YOU FINALIZE YOUR NOTE. IF YOU WANT TO FAX A PRELIMINARY NOTE YOU WILL NEED TO TOGGLE THIS TO 'NO' IF YOU DO NOT WANT IT IN YOUR FAXED NOTE.

## 2024-06-17 ENCOUNTER — APPOINTMENT (OUTPATIENT)
Dept: OBSTETRICS AND GYNECOLOGY | Facility: CLINIC | Age: 79
End: 2024-06-17
Payer: COMMERCIAL

## 2024-06-17 VITALS
HEIGHT: 66 IN | DIASTOLIC BLOOD PRESSURE: 80 MMHG | BODY MASS INDEX: 31.98 KG/M2 | SYSTOLIC BLOOD PRESSURE: 152 MMHG | WEIGHT: 199 LBS

## 2024-06-17 DIAGNOSIS — N32.81 OAB (OVERACTIVE BLADDER): Primary | ICD-10-CM

## 2024-06-17 DIAGNOSIS — N39.41 URGE URINARY INCONTINENCE: ICD-10-CM

## 2024-06-17 PROCEDURE — 1126F AMNT PAIN NOTED NONE PRSNT: CPT | Performed by: NURSE PRACTITIONER

## 2024-06-17 PROCEDURE — 1157F ADVNC CARE PLAN IN RCRD: CPT | Performed by: NURSE PRACTITIONER

## 2024-06-17 PROCEDURE — 1159F MED LIST DOCD IN RCRD: CPT | Performed by: NURSE PRACTITIONER

## 2024-06-17 PROCEDURE — 99213 OFFICE O/P EST LOW 20 MIN: CPT | Performed by: NURSE PRACTITIONER

## 2024-06-17 PROCEDURE — 1160F RVW MEDS BY RX/DR IN RCRD: CPT | Performed by: NURSE PRACTITIONER

## 2024-06-17 PROCEDURE — 3077F SYST BP >= 140 MM HG: CPT | Performed by: NURSE PRACTITIONER

## 2024-06-17 PROCEDURE — 3079F DIAST BP 80-89 MM HG: CPT | Performed by: NURSE PRACTITIONER

## 2024-06-17 ASSESSMENT — PAIN SCALES - GENERAL: PAINLEVEL: 0-NO PAIN

## 2024-06-17 NOTE — PROGRESS NOTES
"HISTORY OF PRESENT ILLNESS:  Patty Oneil is a 78 y.o. female, who presents in follow up for an OAB medication check (Myrbetriq ER 25mg daily).      During last encounter on 4/30/2024, reviewed and agreed to the following:  OAB - Sent Rx on Myrbetriq ER 25mg daily.    Today she reports   Urinary Symptoms:  - She has noticed a significant improvement in her urinary symptoms with being on Myrbetriq ER 25mg daily x1 month with reduced UUI episodes; in comparison to not taking Myrbetriq she would experience UUI multiple times per day and now reports a significant reduction in UUI episodes. She also endorses fewer night time wake ups. However, her insurance does not cover Myrbetriq as her formulary only covers x1 month and requires a prior authorization; with her insurance the Myrbetriq costs around $96/month in comparison to Gemtesa which is $285/3 month supply.   - Her insurance covers Oxybutynin and Trospium but some anticholinergics her insurance covers are associated with dementia in women >65 years old.   - She experiences dry eyes at baseline and has to use eyedrops daily for this and would therefore not be a good candidate to start any anticholinergic.   - Patient has around 10 days worth of Myrbetriq left to hold her over until we hear back from her insurance company about the tier reduction request for Myrbetriq.             PHYSICAL EXAMINATION:  No LMP recorded. Patient has had a hysterectomy.  Body mass index is 32.12 kg/m².  /80   Ht 1.676 m (5' 6\")   Wt 90.3 kg (199 lb)   BMI 32.12 kg/m²     General Appearance: well appearing  Neuro: Alert and oriented   HEENT: mucous membranes moist, neck supple  Resp: No respiratory distress, normal work of breathing  MSK: normal range of motion, gait appropriate      IMPRESSION AND PLAN:  78 year old female with OAB. Comorbidities include: HTN, pulmonary HTN, GERD, and CHUCKY.     Diagnoses:  #1 Overactive bladder    Plan:  1. OAB, UUI  - She has noticed a " significant improvement in her urinary symptoms with being on Myrbetriq ER 25mg daily x1 month with reduced UUI episodes; in comparison to not taking Myrbetriq she would experience UUI multiple times per day and now reports a significant reduction in UUI episodes.   - Her insurance does not cover Myrbetriq as her formulary only covers x1 month and requires a prior authorization.  Of note, with her [employer] the Myrbetriq costs around $96/month in comparison to Gemtesa which is $285/3 month supply. Her insurance covers Oxybutynin and Trospium but some anticholinergics her insurance covers are associated with dementia in women >65 years old and she already experiences memory loss issues/MCI so she would not be a candidate to start Oxybutynin. She experiences dry eyes at baseline and has to use eyedrops daily for this and would therefore not be a good candidate to start Trospium.   - Requested a tier reduction for Myrbetriq coverage to make this medication more affordable. If her insurance denies the tier reduction, she is amenable to stopping Myrbetriq and switching to Trospium 20mg BID. This may take a few days to pend a response from her insurance.     All questions and concerns were answered and addressed.  The patient expressed understanding and agrees with the plan.     Follow up in 6 weeks with NIKOLAS Zamora.     Scribe Attestation  By signing my name below, Neftaly PEDRO Scribe, attest that this documentation has been prepared under the direction and in the presence of NIKOLAS Zamora on 06/17/2024 at 4:14 PM.     IJazmyne, personally performed the services described in the documentation as scribed in my presence and confirm it is both complete and accurate.

## 2024-06-18 NOTE — PROGRESS NOTES
Tier reduction submitted for Myrbetriq.     Confirmation Number: [id number]  Thank you for submitting your coverage determination form electronically.    This message confirms that your coverage determination form has been received and submitted successfully to [facility].    If you or your prescribing physician believes that waiting 72 hours for a standard decision could seriously harm your life or health or ability to regain maximum function, you can ask for a faster decision.    If your prescribing physician asks for a faster decision for you, or supports you in asking for one by stating in writing or in a telephone call to us that he or she agrees that waiting 72 hours could seriously harm your life or health or ability to regain maximum function, we will give you a decision within 24 hours.    If you don’t obtain support from your physician, we will decide if your health condition requires a fast decision.    Please refer to your Evidence of Coverage, which outlines all of the details regarding appeals, coverage determination or grievances. If you have questions about the coverage determination process, call [facility] at [phone] (TTY [phone]), 24 hours a day, 7 days a week.    Thank you.

## 2024-08-05 ENCOUNTER — OFFICE VISIT (OUTPATIENT)
Dept: PULMONOLOGY | Age: 79
End: 2024-08-05
Payer: COMMERCIAL

## 2024-08-05 VITALS
RESPIRATION RATE: 16 BRPM | WEIGHT: 201.8 LBS | SYSTOLIC BLOOD PRESSURE: 142 MMHG | BODY MASS INDEX: 31.67 KG/M2 | DIASTOLIC BLOOD PRESSURE: 82 MMHG | TEMPERATURE: 97.5 F | HEART RATE: 80 BPM | OXYGEN SATURATION: 95 % | HEIGHT: 67 IN

## 2024-08-05 DIAGNOSIS — J45.40 MODERATE PERSISTENT ASTHMA WITHOUT COMPLICATION: Primary | ICD-10-CM

## 2024-08-05 DIAGNOSIS — E66.09 CLASS 1 OBESITY DUE TO EXCESS CALORIES WITHOUT SERIOUS COMORBIDITY WITH BODY MASS INDEX (BMI) OF 32.0 TO 32.9 IN ADULT: ICD-10-CM

## 2024-08-05 DIAGNOSIS — G47.34 NOCTURNAL HYPOXIA: ICD-10-CM

## 2024-08-05 DIAGNOSIS — G47.33 OSA (OBSTRUCTIVE SLEEP APNEA): ICD-10-CM

## 2024-08-05 PROCEDURE — 1123F ACP DISCUSS/DSCN MKR DOCD: CPT | Performed by: INTERNAL MEDICINE

## 2024-08-05 PROCEDURE — 99214 OFFICE O/P EST MOD 30 MIN: CPT | Performed by: INTERNAL MEDICINE

## 2024-08-05 RX ORDER — MIRABEGRON 25 MG/1
25 TABLET, FILM COATED, EXTENDED RELEASE ORAL DAILY
COMMUNITY

## 2024-08-05 NOTE — PROGRESS NOTES
Subjective:     Wendy Holm is a 78 y.o. female who complains today of:     Chief Complaint   Patient presents with    Follow-up     6 Month F/U for Moderate persistent asthma and Nocturnal Hypoxia       HPI  Patient presents for asthma  8/5/2024  Doing good, symptoms controlled, no chest pain, no shortness of breath, she is dieting and losing weight.  She is using O2 while asleep, she feels better, but improved daytime energy and activity level.  Declined CPAP  No lower extremity edema, no heartburn, no nasal congestion or postnasal drip.      2/5/2024  Doing better, symptoms well-controlled, she responded well to Ativan, no coughing, no chest pain, no shortness of breath, no lower extremity edema, no heartburn.  She does not wish to consider repeat sleep study or CPAP device  She is agreeable to do nocturnal pulse oximetry and if needed nocturnal O2    11/1/2023  Doing better, she responded well to Spiriva, denies pain, no shortness of breath, no coughing, no lower extremity edema, no heartburn, no nasal congestion or postnasal drip.  Weight is stable, she has not been using CPAP.      9/18/2023  Patient presents for evaluation of dyspnea on exertion, she reports shortness of breath on exertional activity, no coughing, no chest pain, no wheezing, no nighttime symptoms of coughing or shortness of breath, no lower extremity edema, she has chronic right lower extremity edema, weight is stable, no fever or chills, no nasal congestion or postnasal drip no heartburn.           Allergies:  Patient has no known allergies.  Past Medical History:   Diagnosis Date    Anxiety     Depression     Hyperlipidemia     Meniere's disease     CARLOS (obstructive sleep apnea)     Pulmonary hypertension (HCC)      Past Surgical History:   Procedure Laterality Date    BREAST BIOPSY Right     benign    BREAST LUMPECTOMY Right     benign    CARPAL TUNNEL RELEASE Right 5/3/2023    RIGHT WRIST CARPAL TUNNEL RELEASE performed by Jamie [place]

## 2024-11-07 ENCOUNTER — TELEPHONE (OUTPATIENT)
Dept: OBSTETRICS AND GYNECOLOGY | Facility: CLINIC | Age: 79
End: 2024-11-07
Payer: COMMERCIAL

## 2024-11-07 NOTE — TELEPHONE ENCOUNTER
Per chart review Myrbetriq Rx was sent # 30 with 11RF's on 4/30/24. Office spoke to pharm [facility] in [place] and they confirm pt still has 7 RF available. LDVM informing pt to call pharm to get med filled.

## 2024-11-21 DIAGNOSIS — F32.5 DEPRESSION, MAJOR, IN REMISSION (CMS-HCC): ICD-10-CM

## 2024-11-21 NOTE — TELEPHONE ENCOUNTER
Last OV 04/12/2024  Pending none    Patient states that she only takes the medication once daily instead of twice daily, so I changed the directions to once daily

## 2024-11-23 RX ORDER — ESCITALOPRAM OXALATE 10 MG/1
10 TABLET ORAL DAILY
Qty: 90 TABLET | Refills: 1 | OUTPATIENT
Start: 2024-11-23

## 2024-11-23 NOTE — TELEPHONE ENCOUNTER
Please let Patty know I haven't gotten any labs on her in over a year.  Please ask her to get her fasting labs done so I can address her medicine refill.  Order has been on chart.

## 2024-12-10 ENCOUNTER — LAB (OUTPATIENT)
Dept: LAB | Facility: LAB | Age: 79
End: 2024-12-10
Payer: COMMERCIAL

## 2024-12-10 DIAGNOSIS — E55.9 VITAMIN D DEFICIENCY: ICD-10-CM

## 2024-12-10 DIAGNOSIS — E78.2 MIXED HYPERLIPIDEMIA: ICD-10-CM

## 2024-12-10 DIAGNOSIS — Z00.00 HEALTHCARE MAINTENANCE: ICD-10-CM

## 2024-12-10 DIAGNOSIS — I10 ESSENTIAL HYPERTENSION: ICD-10-CM

## 2024-12-10 LAB
25(OH)D3 SERPL-MCNC: 23 NG/ML (ref 30–100)
ALBUMIN SERPL BCP-MCNC: 4.1 G/DL (ref 3.4–5)
ALP SERPL-CCNC: 94 U/L (ref 33–136)
ALT SERPL W P-5'-P-CCNC: 13 U/L (ref 7–45)
ANION GAP SERPL CALC-SCNC: 11 MMOL/L (ref 10–20)
AST SERPL W P-5'-P-CCNC: 13 U/L (ref 9–39)
BASOPHILS # BLD AUTO: 0.04 X10*3/UL (ref 0–0.1)
BASOPHILS NFR BLD AUTO: 0.9 %
BILIRUB SERPL-MCNC: 0.4 MG/DL (ref 0–1.2)
BUN SERPL-MCNC: 14 MG/DL (ref 6–23)
CALCIUM SERPL-MCNC: 9.1 MG/DL (ref 8.6–10.3)
CHLORIDE SERPL-SCNC: 108 MMOL/L (ref 98–107)
CHOLEST SERPL-MCNC: 186 MG/DL (ref 0–199)
CHOLESTEROL/HDL RATIO: 2.8
CO2 SERPL-SCNC: 27 MMOL/L (ref 21–32)
CREAT SERPL-MCNC: 0.74 MG/DL (ref 0.5–1.05)
EGFRCR SERPLBLD CKD-EPI 2021: 82 ML/MIN/1.73M*2
EOSINOPHIL # BLD AUTO: 0.1 X10*3/UL (ref 0–0.4)
EOSINOPHIL NFR BLD AUTO: 2.3 %
ERYTHROCYTE [DISTWIDTH] IN BLOOD BY AUTOMATED COUNT: 13.8 % (ref 11.5–14.5)
GLUCOSE SERPL-MCNC: 98 MG/DL (ref 74–99)
HCT VFR BLD AUTO: 39.2 % (ref 36–46)
HCV AB SER QL: NONREACTIVE
HDLC SERPL-MCNC: 66.7 MG/DL
HGB BLD-MCNC: 12.6 G/DL (ref 12–16)
IMM GRANULOCYTES # BLD AUTO: 0.01 X10*3/UL (ref 0–0.5)
IMM GRANULOCYTES NFR BLD AUTO: 0.2 % (ref 0–0.9)
LDLC SERPL CALC-MCNC: 107 MG/DL
LYMPHOCYTES # BLD AUTO: 1.43 X10*3/UL (ref 0.8–3)
LYMPHOCYTES NFR BLD AUTO: 33.6 %
MCH RBC QN AUTO: 30.2 PG (ref 26–34)
MCHC RBC AUTO-ENTMCNC: 32.1 G/DL (ref 32–36)
MCV RBC AUTO: 94 FL (ref 80–100)
MONOCYTES # BLD AUTO: 0.42 X10*3/UL (ref 0.05–0.8)
MONOCYTES NFR BLD AUTO: 9.9 %
NEUTROPHILS # BLD AUTO: 2.26 X10*3/UL (ref 1.6–5.5)
NEUTROPHILS NFR BLD AUTO: 53.1 %
NON HDL CHOLESTEROL: 119 MG/DL (ref 0–149)
NRBC BLD-RTO: 0 /100 WBCS (ref 0–0)
PLATELET # BLD AUTO: 274 X10*3/UL (ref 150–450)
POTASSIUM SERPL-SCNC: 4.5 MMOL/L (ref 3.5–5.3)
PROT SERPL-MCNC: 6.9 G/DL (ref 6.4–8.2)
RBC # BLD AUTO: 4.17 X10*6/UL (ref 4–5.2)
SODIUM SERPL-SCNC: 141 MMOL/L (ref 136–145)
TRIGL SERPL-MCNC: 62 MG/DL (ref 0–149)
TSH SERPL-ACNC: 2.09 MIU/L (ref 0.44–3.98)
VLDL: 12 MG/DL (ref 0–40)
WBC # BLD AUTO: 4.3 X10*3/UL (ref 4.4–11.3)

## 2024-12-10 PROCEDURE — 82306 VITAMIN D 25 HYDROXY: CPT

## 2024-12-10 PROCEDURE — 84443 ASSAY THYROID STIM HORMONE: CPT

## 2024-12-10 PROCEDURE — 80061 LIPID PANEL: CPT

## 2024-12-10 PROCEDURE — 86803 HEPATITIS C AB TEST: CPT

## 2024-12-10 PROCEDURE — 85025 COMPLETE CBC W/AUTO DIFF WBC: CPT

## 2024-12-10 PROCEDURE — 80053 COMPREHEN METABOLIC PANEL: CPT

## 2025-01-07 ENCOUNTER — APPOINTMENT (OUTPATIENT)
Dept: NEUROLOGY | Facility: CLINIC | Age: 80
End: 2025-01-07
Payer: COMMERCIAL

## 2025-01-14 ENCOUNTER — APPOINTMENT (OUTPATIENT)
Dept: NEUROLOGY | Facility: CLINIC | Age: 80
End: 2025-01-14
Payer: COMMERCIAL

## 2025-02-21 NOTE — PROGRESS NOTES
HISTORY OF PRESENT ILLNESS:  Patty Oneil is a 79 y.o. female, who presents in follow up for OAB     During last encounter on 24 (Sherlyn), reviewed and agreed to the followin year old female with OAB. Comorbidities include: HTN, pulmonary HTN, GERD, and CHUCKY.      Diagnoses:  #1 Overactive bladder     Plan:  1. OAB, UUI  - She has noticed a significant improvement in her urinary symptoms with being on Myrbetriq ER 25mg daily x1 month with reduced UUI episodes; in comparison to not taking Myrbetriq she would experience UUI multiple times per day and now reports a significant reduction in UUI episodes.   - Her insurance does not cover Myrbetriq as her formulary only covers x1 month and requires a prior authorization.  Of note, with her [employer] the Myrbetriq costs around $96/month in comparison to Gemtesa which is $285/3 month supply. Her insurance covers Oxybutynin and Trospium but some anticholinergics her insurance covers are associated with dementia in women >65 years old and she already experiences memory loss issues/MCI so she would not be a candidate to start Oxybutynin. She experiences dry eyes at baseline and has to use eyedrops daily for this and would therefore not be a good candidate to start Trospium.   - Requested a tier reduction for Myrbetriq coverage to make this medication more affordable. If her insurance denies the tier reduction, she is amenable to stopping Myrbetriq and switching to Trospium 20mg BID. This may take a few days to pend a response from her insurance.      All questions and concerns were answered and addressed.  The patient expressed understanding and agrees with the plan.      Follow up in 6 weeks with Jazmyne Plata, APRN-CNP    Today she reports   Myrbetriq stopped working and was expensive. Worries about MS: falling with bending over, burning sensation in her arms/back/hips.     The following were reviewed to gain additional history:  External  "notes: Dr. Wolff, follow up of CHUCKY, nocturnal hypoxia, asthma  Test results: Cr 0.74 12/10/24          PHYSICAL EXAMINATION:  No LMP recorded. Patient has had a hysterectomy.  Body mass index is 34.7 kg/m².  /80   Ht 1.676 m (5' 6\")   Wt 97.5 kg (215 lb)   BMI 34.70 kg/m²     General Appearance: well appearing  Neuro: Alert and oriented   HEENT: mucous membranes moist, neck supple  Resp: No respiratory distress, normal work of breathing  MSK: normal range of motion, gait appropriate    Pelvic: deferred    PVR (by ultrasound): 15 ml      IMPRESSION AND PLAN:  Patty Oneil is a 79 y.o. who presents in follow up for OAB/UUI    OAB/UUI  - previously tried myrbetriq 25 mg but was cost-prohibitive and stopped working, as above  - already with cognitive impairment/memory loss so would not be a good candidate for anticholinergics  - recommend discussing with PCP regarding neurology referral given her concerns for MS  - reviewed any neurologic conditions affecting brain and spinal cord can impact the bladder  - consult to pharmacy placed to help with gemtesa 75 mg daily, myrbetriq was not covered by insurance  - if gemtesa not effective, reviewed recommend urodynamics to confirm diagnosis (has leakage with standing)    RTC 4 months for follow up    All questions and concerns were answered and addressed.  The patient expressed understanding and agrees with the plan.     Sandrine Rogers MD    "

## 2025-02-24 ENCOUNTER — APPOINTMENT (OUTPATIENT)
Dept: OBSTETRICS AND GYNECOLOGY | Facility: CLINIC | Age: 80
End: 2025-02-24
Payer: COMMERCIAL

## 2025-02-24 VITALS
WEIGHT: 215 LBS | BODY MASS INDEX: 34.55 KG/M2 | HEIGHT: 66 IN | DIASTOLIC BLOOD PRESSURE: 80 MMHG | SYSTOLIC BLOOD PRESSURE: 144 MMHG

## 2025-02-24 DIAGNOSIS — N39.41 URGE URINARY INCONTINENCE: ICD-10-CM

## 2025-02-24 DIAGNOSIS — N32.81 OAB (OVERACTIVE BLADDER): Primary | ICD-10-CM

## 2025-02-24 PROCEDURE — 1036F TOBACCO NON-USER: CPT | Performed by: STUDENT IN AN ORGANIZED HEALTH CARE EDUCATION/TRAINING PROGRAM

## 2025-02-24 PROCEDURE — 1157F ADVNC CARE PLAN IN RCRD: CPT | Performed by: STUDENT IN AN ORGANIZED HEALTH CARE EDUCATION/TRAINING PROGRAM

## 2025-02-24 PROCEDURE — 99214 OFFICE O/P EST MOD 30 MIN: CPT | Performed by: STUDENT IN AN ORGANIZED HEALTH CARE EDUCATION/TRAINING PROGRAM

## 2025-02-24 PROCEDURE — 3079F DIAST BP 80-89 MM HG: CPT | Performed by: STUDENT IN AN ORGANIZED HEALTH CARE EDUCATION/TRAINING PROGRAM

## 2025-02-24 PROCEDURE — 3077F SYST BP >= 140 MM HG: CPT | Performed by: STUDENT IN AN ORGANIZED HEALTH CARE EDUCATION/TRAINING PROGRAM

## 2025-02-24 PROCEDURE — G2211 COMPLEX E/M VISIT ADD ON: HCPCS | Performed by: STUDENT IN AN ORGANIZED HEALTH CARE EDUCATION/TRAINING PROGRAM

## 2025-02-24 PROCEDURE — 1126F AMNT PAIN NOTED NONE PRSNT: CPT | Performed by: STUDENT IN AN ORGANIZED HEALTH CARE EDUCATION/TRAINING PROGRAM

## 2025-02-24 RX ORDER — VIBEGRON 75 MG/1
75 TABLET, FILM COATED ORAL DAILY
Qty: 30 TABLET | Refills: 2 | Status: SHIPPED | OUTPATIENT
Start: 2025-02-24 | End: 2026-02-24

## 2025-02-24 ASSESSMENT — PAIN SCALES - GENERAL: PAINLEVEL_OUTOF10: 0-NO PAIN

## 2025-02-26 ENCOUNTER — TELEPHONE (OUTPATIENT)
Dept: OBSTETRICS AND GYNECOLOGY | Facility: CLINIC | Age: 80
End: 2025-02-26
Payer: COMMERCIAL

## 2025-02-26 NOTE — TELEPHONE ENCOUNTER
Fax received from "[employer] stating that Gemtesa is not covered on pt formulary and pt was given a 30d supply. Myrbetriq, Tolteradine and oxybutynin are on pt formulary. PA for Gemtesa initiated via Epic.     2/27/25 0825 Approved  Prior Authorization Portal   Prior authorization approved  Payer: [employer] Patient's Payer Case ID: [id number]    [phone]  Note from payer: Approved. This drug has been approved under the Member's Medicare Part D benefit for GEMTESA Tablet 75MG. Approved quantity: 30 per 30 day(s). You may fill up to a 90 day supply except for those on Specialty Tier 5, which can be filled up to a 30 day supply. Please call the pharmacy to process the prescription claim.  Approval Details    Authorized from February 26, 2025 to December 31, 2099

## 2025-03-11 ENCOUNTER — APPOINTMENT (OUTPATIENT)
Dept: PHARMACY | Facility: HOSPITAL | Age: 80
End: 2025-03-11
Payer: COMMERCIAL

## 2025-03-11 DIAGNOSIS — N39.41 URGE URINARY INCONTINENCE: Primary | ICD-10-CM

## 2025-03-11 DIAGNOSIS — N32.81 OAB (OVERACTIVE BLADDER): ICD-10-CM

## 2025-03-11 RX ORDER — CHOLECALCIFEROL (VITAMIN D3) 25 MCG
1000 TABLET ORAL DAILY
COMMUNITY

## 2025-03-11 NOTE — PROGRESS NOTES
Patient ID: Patty Oneil is a 79 y.o. female who presents for urinary symptoms    Referring Provider: Sandrine Rogers MD  Pt was referred for urinary symptoms    Preferred Pharmacy:    [id number] - [place] - [address] [facility] [address]  [address]  [place] [address]  Phone: [phone] Fax: [phone]    [id number] [place] [place] - [id number] [facility] [place] [address]  Phone: [phone] Fax: [phone]      Subjective      Vaginal Symptoms  Vaginal Dryness: no  Dysuria (pain, burning, stinging, or itching with urination): no  Vaginal and/or vulvar irritation/itching: irritation due to incontinence wetness  Recurrent urinary tract infections: no  Lab Results   Component Value Date    ESTRADIOL <19 02/08/2022    FSH 71.2 02/08/2022       Non Pharm Mgmt:   Vagisil  Previous Treatment:   none  Current Treatment:   none    Urinary Symptoms  Medications that may contribute to symptoms:   none  Any history of:   Narrow-angle glaucoma? no  Impaired gastric emptying? no  Urinary retention? Yes, more prominent in the morning  If yes to any of the above use caution/avoid antimuscarinic medications    If diabetes, blood sugar controlled? No DM dx  Frequently of bowel movement? Once a day  Tobacco use? no  How many protective undergarments are you utilizing daily? 4-6/day, use heavy undergarment  Recommend Coloplast Ronan Protect moisture barrier cream for incontinence associated skin irritation/breakdown.     What medications have been tried/stopped?   Mybetriq - did not note a good improvement, stopped due to insurance started last 4/2024, took for 4 months  Current medication? None, would like to try Gemtesa  When started? N/a  Side effects? N/a  Improvement in symptoms? N/a      Cardiovascular Health  The 10-year ASCVD risk score (Delisa YAO, et al., 2019) is: 38.8%    Values used to calculate the score:      Age: 79 years      Sex:  "Female      Is Non- : No      Diabetic: No      Tobacco smoker: No      Systolic Blood Pressure: 144 mmHg      Is BP treated: Yes      HDL Cholesterol: 66.7 mg/dL      Total Cholesterol: 186 mg/dL    Lab Results   Component Value Date    CHOL 186 12/10/2024     Lab Results   Component Value Date    HDL 66.7 12/10/2024     Lab Results   Component Value Date    LDLCALC 107 (H) 12/10/2024     Lab Results   Component Value Date    TRIG 62 12/10/2024     No components found for: \"CHOLHDL\"        Blood Sugar Balance  Lab Results   Component Value Date    GLUCOSE 98 12/10/2024    HGBA1C 5.7 (A) 05/15/2023    HGBA1C 5.8 05/17/2021     No results found for: \"LEPTIN\", \"INSULFAST\", \"GLUF\"      Thyroid  Lab Results   Component Value Date    TSH 2.09 12/10/2024    FREET4 0.80 02/08/2022       Iron Status  Lab Results   Component Value Date    IRON 111 02/08/2022    TIBC 369 02/08/2022    FERRITIN 34 02/08/2022        Kidney Function  Lab Results   Component Value Date    GFRF 80 05/15/2023    CREATININE 0.74 12/10/2024       Potassium  Lab Results   Component Value Date    K 4.5 12/10/2024        Vitamin D3  Lab Results   Component Value Date    VITD25 23 (L) 12/10/2024       Current Outpatient Medications on File Prior to Visit   Medication Sig Dispense Refill    aspirin 81 mg EC tablet Take 1 tablet (81 mg) by mouth once daily.      atorvastatin (Lipitor) 80 mg tablet Take 1 tablet (80 mg) by mouth once daily at bedtime. 90 tablet 1    biotin 10,000 mcg capsule Take 1 capsule (10 mg) by mouth once daily.      escitalopram (Lexapro) 10 mg tablet Take 1 tablet (10 mg) by mouth 2 times a day.      gabapentin (Neurontin) 100 mg capsule Take 3 capsules (300 mg) by mouth once daily at bedtime. At bedtime      meclizine (Antivert) 25 mg tablet Take 1 tablet (25 mg) by mouth 3 times a day as needed.      metoprolol tartrate (Lopressor) 25 mg tablet TAKE 1 TABLET BY MOUTH TWICE DAILY 180 tablet 0    mirabegron " (Mybetriq) 25 mg tablet extended release 24 hr 24 hr tablet Take 1 tablet (25 mg) by mouth once daily. (Patient not taking: Reported on 2/24/2025) 30 tablet 11    nitroglycerin (Nitrostat) 0.4 mg SL tablet Place 1 tablet (0.4 mg) under the tongue every 5 minutes if needed for chest pain. Up to 3 doses as needed for chest pain. Call 911 if pain persists. 90 tablet 1    omeprazole (PriLOSEC) 20 mg DR capsule Take 1 capsule (20 mg) by mouth once daily in the morning. Take before meals.      ondansetron ODT (Zofran-ODT) 4 mg disintegrating tablet Dissolve 1 tablet (4 mg) in the mouth every 8 hours if needed for nausea.      oxygen (O2) gas therapy Inhale 1 each continuously. 2L at bedtime      rOPINIRole (Requip) 1 mg tablet Take 1 tablet (1 mg) by mouth 3 times a day.      triamterene-hydrochlorothiazid (Maxzide-25) 37.5-25 mg tablet Take 1 tablet by mouth 2 times a day.      vibegron (Gemtesa) 75 mg tablet Take 1 tablet (75 mg) by mouth once daily. 30 tablet 2     No current facility-administered medications on file prior to visit.        Medication and allergy reconciliation completed     Drug Interactions   No significant drug interactions identified    Assessment/Plan     Patient is experiencing urinary frequency, urgency, and incontinence.   Has tried before Myrbetriq, did not note a good improvement, stopped due to insurance    Gemtesa   Discussed MOA: works by activating beta-3 adrenergic receptors in the bladder resulting in relaxation of the detrusor smooth muscle during the urine storage phase, thus increasing bladder capacity.  Provided education on administration and potential side effects including but not limited to hypertension 9%, hot flashes <2%, constipation/diarrhea <2%, dry mouth <2%, and headache <4%.   Gemtesa (vibegron) starts working almost immediately - within a few days of first taking it, with noticeable improvements in urinary urgency, frequency, and incontinence noted in clinical trials at  2 weeks which were reported as significant by 12 weeks.    LABS  Lab Results   Component Value Date    GFRF 80 05/15/2023     Make sure GFR >15 ml/min or dose adjust      START  Gemtesa 75 mg once daily     Patient Assistance Program (PAP)    Application for program to be submitted for the following medications: Quinnesectesa    [facility] Permanent Address: [place]   Prescription Insurance:   Yes   Members of Household: 1   Files Taxes: Yes     Patient will be email financial information to pharmacist directly at this pharmacist email.    Patient verbally reports monthly or yearly income which is less than 400% federal poverty level    Patient aware this process may take up to 6 weeks.     If approved medication must be filled through [facility] and MEDICATION WILL BE MAILED TO PATIENT.    Follow-up: 4/16/2025 10:20 AM      Time spent with pt: Total length of time 30 (minutes) of the encounter and more than 50% was spent counseling the patient.      Kendra Guajardo, PharmD   Meds Clinical Pharmacist  Phone: [phone]     Continue all meds under the continuation of care with the referring provider and clinical pharmacy team.    Verbal consent to manage patient's drug therapy was obtained from the patient and/or an individual authorized to act on behalf of a patient. They were informed they may decline to participate or withdraw from participation in pharmacy services at any time.

## 2025-03-12 RX ORDER — VIBEGRON 75 MG/1
75 TABLET, FILM COATED ORAL DAILY
Qty: 90 TABLET | Refills: 3 | Status: SHIPPED | OUTPATIENT
Start: 2025-03-12 | End: 2026-03-12

## 2025-03-17 ENCOUNTER — TELEPHONE (OUTPATIENT)
Dept: PRIMARY CARE | Facility: CLINIC | Age: 80
End: 2025-03-17
Payer: COMMERCIAL

## 2025-03-17 DIAGNOSIS — Z12.31 BREAST CANCER SCREENING BY MAMMOGRAM: Primary | ICD-10-CM

## 2025-03-18 PROCEDURE — RXMED WILLOW AMBULATORY MEDICATION CHARGE

## 2025-03-20 ENCOUNTER — PHARMACY VISIT (OUTPATIENT)
Dept: PHARMACY | Facility: CLINIC | Age: 80
End: 2025-03-20
Payer: COMMERCIAL

## 2025-04-07 DIAGNOSIS — I10 ESSENTIAL HYPERTENSION: ICD-10-CM

## 2025-04-07 DIAGNOSIS — E78.2 MIXED HYPERLIPIDEMIA: ICD-10-CM

## 2025-04-07 DIAGNOSIS — F32.5 DEPRESSION, MAJOR, IN REMISSION (CMS-HCC): ICD-10-CM

## 2025-04-07 NOTE — TELEPHONE ENCOUNTER
Last appt 4/14/24    Patient left voicemail for refill requests for the following formatted medications:   Requested Prescriptions     Pending Prescriptions Disp Refills    atorvastatin (Lipitor) 80 mg tablet 90 tablet 1     Sig: Take 1 tablet (80 mg) by mouth once daily at bedtime.    escitalopram (Lexapro) 10 mg tablet 180 tablet 1     Sig: Take 1 tablet (10 mg) by mouth 2 times a day.    metoprolol tartrate (Lopressor) 25 mg tablet 180 tablet 0     Sig: Take 1 tablet (25 mg) by mouth 2 times a day.       Please schedule patient for a Medicare Wellness and route back to PCP for refills after

## 2025-04-11 RX ORDER — METOPROLOL TARTRATE 25 MG/1
25 TABLET, FILM COATED ORAL 2 TIMES DAILY
Qty: 180 TABLET | Refills: 0 | Status: SHIPPED | OUTPATIENT
Start: 2025-04-11

## 2025-04-11 RX ORDER — ATORVASTATIN CALCIUM 80 MG/1
80 TABLET, FILM COATED ORAL NIGHTLY
Qty: 90 TABLET | Refills: 1 | Status: SHIPPED | OUTPATIENT
Start: 2025-04-11

## 2025-04-11 RX ORDER — ESCITALOPRAM OXALATE 10 MG/1
10 TABLET ORAL 2 TIMES DAILY
Qty: 180 TABLET | Refills: 1 | Status: SHIPPED | OUTPATIENT
Start: 2025-04-11

## 2025-04-14 ENCOUNTER — APPOINTMENT (OUTPATIENT)
Dept: RADIOLOGY | Facility: HOSPITAL | Age: 80
End: 2025-04-14
Payer: MEDICARE

## 2025-04-16 ENCOUNTER — APPOINTMENT (OUTPATIENT)
Dept: PHARMACY | Facility: HOSPITAL | Age: 80
End: 2025-04-16
Payer: COMMERCIAL

## 2025-04-16 DIAGNOSIS — N39.41 URGE URINARY INCONTINENCE: Primary | ICD-10-CM

## 2025-04-16 NOTE — PROGRESS NOTES
Patient ID: Patty Oneil is a 79 y.o. female who presents for urinary symptoms    Referring Provider: Sandrine Rogers MD  Pt was referred for urinary symptoms     Patient Assistance for Gemtesa approved through 3/18/2026. Will have to be renewed prior to that date to prevent lapse in coverage. Medication(s) will be received at no cost to [facility] from [facility].      Preferred Pharmacy:    [id number] - [place], [place] - [address] AT [facility] [address]  [address] [place] [address]  Phone: [phone] Fax: [phone]    [facility]/pharmacy #[id number] - [place], [place] - [address]  [address] [address]  Phone: [phone] Fax: [phone]    [facility]  [address], [address]  [facility] [address]  Phone: [phone] Fax: [phone]      Subjective      Patient presented for OAB follow up. She noted significant improvement after 2 weeks of starting gemtesa, used to use 6-8 undergarments, but now has been down to 1. She has more bladder control, also using moisture barrier cream for skin irritation    Vaginal Symptoms  Vaginal Dryness: no  Dysuria (pain, burning, stinging, or itching with urination): no  Vaginal and/or vulvar irritation/itching: irritation due to incontinence wetness  Recurrent urinary tract infections: no  Lab Results   Component Value Date    ESTRADIOL <19 02/08/2022    FSH 71.2 02/08/2022       Non Pharm Mgmt:   Vagisil    Urinary Symptoms  Medications that may contribute to symptoms:   none  If diabetes, blood sugar controlled? No DM dx  Frequently of bowel movement? Once a day  Tobacco use? no  How many protective undergarments are you utilizing daily? 1-2/day, use heavy undergarment     What medications have been tried/stopped?   Mybetriq - did not note a good improvement, stopped due to insurance started last 4/2024, took for 4 months  Current medication? Gemtesa 75 mg daily  When started? 1  "month ago  Side effects? None. Currently experienced stressed due to family issue  Improvement in symptoms? Good improvement with bladder control so far      Cardiovascular Health  The 10-year ASCVD risk score (Delisa DK, et al., 2019) is: 38.8%    Values used to calculate the score:      Age: 79 years      Sex: Female      Is Non- : No      Diabetic: No      Tobacco smoker: No      Systolic Blood Pressure: 144 mmHg      Is BP treated: Yes      HDL Cholesterol: 66.7 mg/dL      Total Cholesterol: 186 mg/dL    Lab Results   Component Value Date    CHOL 186 12/10/2024     Lab Results   Component Value Date    HDL 66.7 12/10/2024     Lab Results   Component Value Date    LDLCALC 107 (H) 12/10/2024     Lab Results   Component Value Date    TRIG 62 12/10/2024     No components found for: \"CHOLHDL\"        Blood Sugar Balance  Lab Results   Component Value Date    GLUCOSE 98 12/10/2024    HGBA1C 5.7 (A) 05/15/2023    HGBA1C 5.8 05/17/2021     No results found for: \"LEPTIN\", \"INSULFAST\", \"GLUF\"      Thyroid  Lab Results   Component Value Date    TSH 2.09 12/10/2024    FREET4 0.80 02/08/2022       Iron Status  Lab Results   Component Value Date    IRON 111 02/08/2022    TIBC 369 02/08/2022    FERRITIN 34 02/08/2022        Kidney Function  Lab Results   Component Value Date    GFRF 80 05/15/2023    CREATININE 0.74 12/10/2024       Potassium  Lab Results   Component Value Date    K 4.5 12/10/2024        Vitamin D3  Lab Results   Component Value Date    VITD25 23 (L) 12/10/2024       Current Outpatient Medications on File Prior to Visit   Medication Sig Dispense Refill    aspirin 81 mg EC tablet Take 1 tablet (81 mg) by mouth once daily.      atorvastatin (Lipitor) 80 mg tablet Take 1 tablet (80 mg) by mouth once daily at bedtime. 90 tablet 1    biotin 10,000 mcg capsule Take 1 capsule (10 mg) by mouth once daily.      cholecalciferol (Vitamin D3) 25 mcg (1000 units) tablet Take 1 tablet (1,000 Units) by " mouth once daily.      cyanocobalamin/folic acid (VITAMIN B12-FOLIC ACID ORAL) Take 800 mg by mouth once daily.      escitalopram (Lexapro) 10 mg tablet Take 1 tablet (10 mg) by mouth 2 times a day. 180 tablet 1    gabapentin (Neurontin) 100 mg capsule Take 3 capsules (300 mg) by mouth once daily at bedtime. At bedtime      meclizine (Antivert) 25 mg tablet Take 1 tablet (25 mg) by mouth 3 times a day as needed.      metoprolol tartrate (Lopressor) 25 mg tablet Take 1 tablet (25 mg) by mouth 2 times a day. 180 tablet 0    nitroglycerin (Nitrostat) 0.4 mg SL tablet Place 1 tablet (0.4 mg) under the tongue every 5 minutes if needed for chest pain. Up to 3 doses as needed for chest pain. Call 911 if pain persists. 90 tablet 1    omeprazole (PriLOSEC) 20 mg DR capsule Take 1 capsule (20 mg) by mouth once daily in the morning. Take before meals.      ondansetron ODT (Zofran-ODT) 4 mg disintegrating tablet Dissolve 1 tablet (4 mg) in the mouth every 8 hours if needed for nausea.      oxygen (O2) gas therapy Inhale 1 each continuously. 2L at bedtime      rOPINIRole (Requip) 1 mg tablet Take 1 tablet (1 mg) by mouth 3 times a day.      triamterene-hydrochlorothiazid (Maxzide-25) 37.5-25 mg tablet Take 1 tablet by mouth 2 times a day as needed (Meniere disease).      vibegron (Gemtesa) 75 mg tablet Take 1 tablet (75 mg) by mouth once daily. 90 tablet 3     No current facility-administered medications on file prior to visit.        Medication and allergy reconciliation completed     Drug Interactions   No significant drug interactions identified    Assessment/Plan     Patient noted good improvement since starting Gemtesa. Currently experienced some stress due to family issues, no side effects noted  Has tried before Myrbetriq, did not note a good improvement, stopped due to insurance    Gemtesa   Discussed MOA: works by activating beta-3 adrenergic receptors in the bladder resulting in relaxation of the detrusor smooth muscle  during the urine storage phase, thus increasing bladder capacity.  Provided education on administration and potential side effects including but not limited to hypertension 9%, hot flashes <2%, constipation/diarrhea <2%, dry mouth <2%, and headache <4%.   Gemtesa (vibegron) starts working almost immediately - within a few days of first taking it, with noticeable improvements in urinary urgency, frequency, and incontinence noted in clinical trials at 2 weeks which were reported as significant by 12 weeks.    LABS  Lab Results   Component Value Date    GFRF 80 05/15/2023     Make sure GFR >15 ml/min or dose adjust      START  Gemtesa 75 mg once daily    Follow-up: 6/18/2025 10:20 AM       Time spent with pt: Total length of time 30 (minutes) of the encounter and more than 50% was spent counseling the patient.      Suly ChoudhuryD   Meds Clinical Pharmacist  Phone: [phone]     Continue all meds under the continuation of care with the referring provider and clinical pharmacy team.    Verbal consent to manage patient's drug therapy was obtained from the patient and/or an individual authorized to act on behalf of a patient. They were informed they may decline to participate or withdraw from participation in pharmacy services at any time.

## 2025-04-18 ENCOUNTER — TELEPHONE (OUTPATIENT)
Dept: PRIMARY CARE | Facility: CLINIC | Age: 80
End: 2025-04-18
Payer: MEDICARE

## 2025-04-18 NOTE — TELEPHONE ENCOUNTER
Reviewed call from patient stating she received a letter from the state requesting a new 10 year handicap placard. Advised RYAN out of office and can send to covering provider. Patient states she is ok to wait until RYAN returns to office. Sent to provider.

## 2025-04-24 NOTE — TELEPHONE ENCOUNTER
"   04/24/25 1141   Appointment Info   Signing Clinician's Name / Credentials (PT) SIMRAN Wallace   Student Supervision Therapy services provided with the co-signing licensed therapist guiding and directing the services, and providing the skilled judgement and assessment throughout the session   Living Environment   People in Home child(elsie), adult   Current Living Arrangements house   Home Accessibility stairs to enter home;stairs within home   Number of Stairs, Main Entrance 1   Stair Railings, Main Entrance none   Number of Stairs, Within Home, Primary seven  (split level; 7 up, 7 down)   Stair Railings, Within Home, Primary railings safe and in good condition   Transportation Anticipated family or friend will provide   Living Environment Comments Pt lives w/ adult son in a split level home. 1 step through garage to enter house, 7 stairs up, 7 stairs down. Tub shower. No shower chair or railings in BR.   Self-Care   Usual Activity Tolerance fair   Current Activity Tolerance poor   Regular Exercise No   Equipment Currently Used at Home raised toilet seat;walker, rolling   Fall history within last six months yes   Number of times patient has fallen within last six months 1   Activity/Exercise/Self-Care Comment Pt amb w/ FWW, recieves assistance from son for transfers and ADLs as needed. Pt has been near \"bed-bound\" as of recently d/t weakness.   General Information   Onset of Illness/Injury or Date of Surgery 04/23/25   Referring Physician Raul Shepard MD   Patient/Family Therapy Goals Statement (PT) Return to home   Pertinent History of Current Problem (include personal factors and/or comorbidities that impact the POC) Pt is a 70 year old F who was brought in after her son found her next to her bed with her head wedged between the bed and a piece of furniture. She was unable to get up and apparently has been weak since having \"the flu\" in March. She feels that she has been weak   Existing " Pt is switching insurance to [employer] and [facility] is going to be picking up her O2 equipment. Pt needs an O2 order sent to [facility].    Precautions/Restrictions fall   Cognition   Affect/Mental Status (Cognition) WFL   Follows Commands (Cognition) WFL   Pain Assessment   Patient Currently in Pain No   Integumentary/Edema   Integumentary/Edema Comments B LE dryness and scabbing, see WOC note   Posture    Posture Forward head position;Protracted shoulders   Range of Motion (ROM)   Range of Motion Knee, Left (Group);Knee, Right (Group)   Left Knee Extension/Flexion ROM   Left Knee Extension/Flexion AROM - degrees Lacking final 10 deg of knee extension   Right Knee Extension/Flexion ROM   Right Knee Extension/Flexion AROM - degrees Lacking final 10 deg of knee extension   Strength (Manual Muscle Testing)   Strength (Manual Muscle Testing) Deficits observed during functional mobility   Strength Comments unable to SLR with RLE   Bed Mobility   Comment, (Bed Mobility) supine <> sit w/ mod A   Transfers   Comment, (Transfers) sit <> stand w/ SS and max A x2   Gait/Stairs (Locomotion)   Comment, (Gait/Stairs) max A x 2 to  SS, unable to weight shift or ambulate   Balance   Balance Comments impaired: required SS and max A x2 while standing   Clinical Impression   Criteria for Skilled Therapeutic Intervention Yes, treatment indicated   PT Diagnosis (PT) Impaired functional mobility   Influenced by the following impairments Weakness, decreased ROM, impaired balance   Functional limitations due to impairments Difficulty w/ bed mobility, transfers, amb, and stairs   Clinical Presentation (PT Evaluation Complexity) stable   Clinical Presentation Rationale Clinical judgement   Clinical Decision Making (Complexity) low complexity   Planned Therapy Interventions (PT) balance training;gait training;bed mobility training;home exercise program;patient/family education;ROM (range of motion);stair training;strengthening;stretching;transfer training;risk factor education;progressive activity/exercise;home program guidelines   Risk & Benefits of therapy have been  explained evaluation/treatment results reviewed;care plan/treatment goals reviewed;risks/benefits reviewed;current/potential barriers reviewed;participants voiced agreement with care plan;participants included;patient   PT Total Evaluation Time   PT Eval, Low Complexity Minutes (29474) 10   Physical Therapy Goals   PT Frequency 5x/week   PT Predicted Duration/Target Date for Goal Attainment 05/01/25   PT Goals Bed Mobility;Transfers;Gait;Stairs   PT: Bed Mobility Independent;Supine to/from sit   PT: Transfers Modified independent;Sit to/from stand;Assistive device   PT: Gait Modified independent;Assistive device;100 feet   PT: Stairs Supervision/stand-by assist;Assistive device;7 stairs   PT Discharge Planning   PT Plan A x 2, increase IND w/ bed mob, repeated STS w/ SS, increase standing duration, BLE TE   PT Discharge Recommendation (DC Rec) Transitional Care Facility   PT Rationale for DC Rec Pt is currently below reported baseline. At baseline pt amb w/ FWW and receives assistance from son as needed, pt reports being mostly bed bound as of late. Currently pt requires max A x2 w/ SS for transfers. Requires mod A for bed mobility, overall limited by weakness, impaired ROM and balance. Pt is unable to demonstrate mobility for home. Recommend TCU to improve above impairments to progress IND functional mobility.   PT Brief overview of current status Max Ax2 w/ SS   PT Total Distance Amb During Session (feet) 0   Physical Therapy Time and Intention   Total Session Time (sum of timed and untimed services) 10

## 2025-04-25 ENCOUNTER — HOSPITAL ENCOUNTER (OUTPATIENT)
Dept: RADIOLOGY | Facility: HOSPITAL | Age: 80
Discharge: HOME | End: 2025-04-25
Payer: MEDICARE

## 2025-04-25 VITALS — WEIGHT: 215 LBS | BODY MASS INDEX: 34.7 KG/M2

## 2025-04-25 DIAGNOSIS — Z12.31 BREAST CANCER SCREENING BY MAMMOGRAM: ICD-10-CM

## 2025-04-25 PROCEDURE — 77067 SCR MAMMO BI INCL CAD: CPT

## 2025-05-28 ENCOUNTER — APPOINTMENT (OUTPATIENT)
Dept: PRIMARY CARE | Facility: CLINIC | Age: 80
End: 2025-05-28
Payer: MEDICARE

## 2025-06-02 ENCOUNTER — APPOINTMENT (OUTPATIENT)
Dept: OBSTETRICS AND GYNECOLOGY | Facility: CLINIC | Age: 80
End: 2025-06-02
Payer: MEDICARE

## 2025-06-11 PROCEDURE — RXMED WILLOW AMBULATORY MEDICATION CHARGE

## 2025-06-13 DIAGNOSIS — J45.40 MODERATE PERSISTENT ASTHMA WITHOUT COMPLICATION: ICD-10-CM

## 2025-06-13 DIAGNOSIS — J45.40 MODERATE PERSISTENT ASTHMA WITHOUT COMPLICATION: Primary | ICD-10-CM

## 2025-06-16 ENCOUNTER — PHARMACY VISIT (OUTPATIENT)
Dept: PHARMACY | Facility: CLINIC | Age: 80
End: 2025-06-16
Payer: COMMERCIAL

## 2025-06-16 ENCOUNTER — APPOINTMENT (OUTPATIENT)
Dept: PRIMARY CARE | Facility: CLINIC | Age: 80
End: 2025-06-16
Payer: MEDICARE

## 2025-06-18 ENCOUNTER — APPOINTMENT (OUTPATIENT)
Dept: PHARMACY | Facility: HOSPITAL | Age: 80
End: 2025-06-18
Payer: MEDICARE

## 2025-06-18 DIAGNOSIS — N32.81 OAB (OVERACTIVE BLADDER): ICD-10-CM

## 2025-06-18 DIAGNOSIS — N39.41 URGE URINARY INCONTINENCE: Primary | ICD-10-CM

## 2025-06-18 NOTE — PROGRESS NOTES
Patient ID: Patty Oneil is a 79 y.o. female who presents for urinary symptoms    Referring Provider: Sandrine Rogers MD  Pt was referred for urinary symptoms     Patient Assistance for Gemtesa approved through 3/18/2026. Will have to be renewed prior to that date to prevent lapse in coverage. Medication(s) will be received at no cost to patient from [facility].      Preferred Pharmacy:    [id number] - [place], [place] - [address] AT [facility] [address]   [address] [place] [address]  Phone: [phone] Fax: [phone]    [facility]/pharmacy #[id number] - [place], [place] - [address]  [address] [address]  Phone: [phone] Fax: [phone]    [facility]  [address], [address]  [facility] [address]  Phone: [phone] Fax: [phone]      Subjective      Patient presented for OAB follow up. She noted significant improvement with Gemtesa, no side effect. Patient noted she no longer needed pad    Vaginal Symptoms  Vaginal Dryness: no  Dysuria (pain, burning, stinging, or itching with urination): no  Vaginal and/or vulvar irritation/itching: irritation due to incontinence wetness  Recurrent urinary tract infections: no  Lab Results   Component Value Date    ESTRADIOL <19 02/08/2022    FSH 71.2 02/08/2022       Non Pharm Mgmt:   Vagisil    Urinary Symptoms  Medications that may contribute to symptoms:   none  If diabetes, blood sugar controlled? No DM dx  Frequently of bowel movement? Once a day  Tobacco use? no  How many protective undergarments are you utilizing daily? 0     What medications have been tried/stopped?   Mybetriq - did not note a good improvement, stopped due to insurance started last 4/2024, took for 4 months  Current medication? Gemtesa 75 mg daily  When started? 4/2025  Side effects? None.   Improvement in symptoms? Good improvement with bladder control so far      Cardiovascular Health  The 10-year  "ASCVD risk score (Delisa YAO, et al., 2019) is: 38.8%    Values used to calculate the score:      Age: 79 years      Sex: Female      Is Non- : No      Diabetic: No      Tobacco smoker: No      Systolic Blood Pressure: 144 mmHg      Is BP treated: Yes      HDL Cholesterol: 66.7 mg/dL      Total Cholesterol: 186 mg/dL    Lab Results   Component Value Date    CHOL 186 12/10/2024     Lab Results   Component Value Date    HDL 66.7 12/10/2024     Lab Results   Component Value Date    LDLCALC 107 (H) 12/10/2024     Lab Results   Component Value Date    TRIG 62 12/10/2024     No components found for: \"CHOLHDL\"        Blood Sugar Balance  Lab Results   Component Value Date    GLUCOSE 98 12/10/2024    HGBA1C 5.7 (A) 05/15/2023    HGBA1C 5.8 05/17/2021     No results found for: \"LEPTIN\", \"INSULFAST\", \"GLUF\"      Thyroid  Lab Results   Component Value Date    TSH 2.09 12/10/2024    FREET4 0.80 02/08/2022       Iron Status  Lab Results   Component Value Date    IRON 111 02/08/2022    TIBC 369 02/08/2022    FERRITIN 34 02/08/2022        Kidney Function  Lab Results   Component Value Date    GFRF 80 05/15/2023    CREATININE 0.74 12/10/2024       Potassium  Lab Results   Component Value Date    K 4.5 12/10/2024        Vitamin D3  Lab Results   Component Value Date    VITD25 23 (L) 12/10/2024       Current Outpatient Medications on File Prior to Visit   Medication Sig Dispense Refill    aspirin 81 mg EC tablet Take 1 tablet (81 mg) by mouth once daily.      atorvastatin (Lipitor) 80 mg tablet Take 1 tablet (80 mg) by mouth once daily at bedtime. 90 tablet 1    biotin 10,000 mcg capsule Take 1 capsule (10 mg) by mouth once daily.      cholecalciferol (Vitamin D3) 25 mcg (1000 units) tablet Take 1 tablet (1,000 Units) by mouth once daily.      cyanocobalamin/folic acid (VITAMIN B12-FOLIC ACID ORAL) Take 800 mg by mouth once daily.      escitalopram (Lexapro) 10 mg tablet Take 1 tablet (10 mg) by mouth 2 times a " day. 180 tablet 1    gabapentin (Neurontin) 100 mg capsule Take 3 capsules (300 mg) by mouth once daily at bedtime. At bedtime      meclizine (Antivert) 25 mg tablet Take 1 tablet (25 mg) by mouth 3 times a day as needed.      metoprolol tartrate (Lopressor) 25 mg tablet Take 1 tablet (25 mg) by mouth 2 times a day. 180 tablet 0    nitroglycerin (Nitrostat) 0.4 mg SL tablet Place 1 tablet (0.4 mg) under the tongue every 5 minutes if needed for chest pain. Up to 3 doses as needed for chest pain. Call 911 if pain persists. 90 tablet 1    omeprazole (PriLOSEC) 20 mg DR capsule Take 1 capsule (20 mg) by mouth once daily in the morning. Take before meals.      ondansetron ODT (Zofran-ODT) 4 mg disintegrating tablet Dissolve 1 tablet (4 mg) in the mouth every 8 hours if needed for nausea.      oxygen (O2) gas therapy Inhale 1 each continuously. 2L at bedtime      rOPINIRole (Requip) 1 mg tablet Take 1 tablet (1 mg) by mouth 3 times a day.      triamterene-hydrochlorothiazid (Maxzide-25) 37.5-25 mg tablet Take 1 tablet by mouth 2 times a day as needed (Meniere disease).      vibegron (Gemtesa) 75 mg tablet Take 1 tablet (75 mg) by mouth once daily. 90 tablet 3     No current facility-administered medications on file prior to visit.        Medication and allergy reconciliation completed     Drug Interactions   No significant drug interactions identified    Assessment/Plan     Patient noted good improvement since starting Gemtesa.   Has tried before Myrbetriq, did not note a good improvement, stopped due to insurance    Gemtesa   Discussed MOA: works by activating beta-3 adrenergic receptors in the bladder resulting in relaxation of the detrusor smooth muscle during the urine storage phase, thus increasing bladder capacity.  Provided education on administration and potential side effects including but not limited to hypertension 9%, hot flashes <2%, constipation/diarrhea <2%, dry mouth <2%, and headache <4%.   Gemtesa  (vibegron) starts working almost immediately - within a few days of first taking it, with noticeable improvements in urinary urgency, frequency, and incontinence noted in clinical trials at 2 weeks which were reported as significant by 12 weeks.    Continue   Gemtesa 75 mg once daily    Follow-up: 2/18/2026 11:00 AM      Time spent with pt: Total length of time 15 (minutes) of the encounter and more than 50% was spent counseling the patient.      Kendra Guajardo PharmD  [facility] Clinical Pharmacist  Phone: [phone]     Continue all meds under the continuation of care with the referring provider and clinical pharmacy team.    Verbal consent to manage patient's drug therapy was obtained from the patient and/or an individual authorized to act on behalf of a patient. They were informed they may decline to participate or withdraw from participation in pharmacy services at any time.

## 2025-06-23 ENCOUNTER — APPOINTMENT (OUTPATIENT)
Dept: OBSTETRICS AND GYNECOLOGY | Facility: CLINIC | Age: 80
End: 2025-06-23
Payer: MEDICARE

## 2025-06-30 ENCOUNTER — APPOINTMENT (OUTPATIENT)
Dept: NEUROLOGY | Facility: CLINIC | Age: 80
End: 2025-06-30
Payer: MEDICARE

## 2025-07-23 ENCOUNTER — APPOINTMENT (OUTPATIENT)
Dept: PRIMARY CARE | Facility: CLINIC | Age: 80
End: 2025-07-23
Payer: MEDICARE

## 2025-08-05 ENCOUNTER — TELEPHONE (OUTPATIENT)
Age: 80
End: 2025-08-05

## 2025-08-05 ENCOUNTER — OFFICE VISIT (OUTPATIENT)
Age: 80
End: 2025-08-05
Payer: MEDICARE

## 2025-08-05 VITALS
BODY MASS INDEX: 34.5 KG/M2 | HEART RATE: 83 BPM | OXYGEN SATURATION: 95 % | RESPIRATION RATE: 18 BRPM | WEIGHT: 219.8 LBS | HEIGHT: 67 IN | SYSTOLIC BLOOD PRESSURE: 136 MMHG | DIASTOLIC BLOOD PRESSURE: 74 MMHG | TEMPERATURE: 97.6 F

## 2025-08-05 DIAGNOSIS — G47.34 NOCTURNAL HYPOXIA: ICD-10-CM

## 2025-08-05 DIAGNOSIS — E66.09 CLASS 1 OBESITY DUE TO EXCESS CALORIES WITHOUT SERIOUS COMORBIDITY WITH BODY MASS INDEX (BMI) OF 32.0 TO 32.9 IN ADULT: ICD-10-CM

## 2025-08-05 DIAGNOSIS — J45.40 MODERATE PERSISTENT ASTHMA WITHOUT COMPLICATION: Primary | ICD-10-CM

## 2025-08-05 DIAGNOSIS — G47.33 OSA (OBSTRUCTIVE SLEEP APNEA): ICD-10-CM

## 2025-08-05 DIAGNOSIS — E66.811 CLASS 1 OBESITY DUE TO EXCESS CALORIES WITHOUT SERIOUS COMORBIDITY WITH BODY MASS INDEX (BMI) OF 32.0 TO 32.9 IN ADULT: ICD-10-CM

## 2025-08-05 PROCEDURE — 1123F ACP DISCUSS/DSCN MKR DOCD: CPT | Performed by: INTERNAL MEDICINE

## 2025-08-05 PROCEDURE — 1159F MED LIST DOCD IN RCRD: CPT | Performed by: INTERNAL MEDICINE

## 2025-08-05 PROCEDURE — G8399 PT W/DXA RESULTS DOCUMENT: HCPCS | Performed by: INTERNAL MEDICINE

## 2025-08-05 PROCEDURE — 99213 OFFICE O/P EST LOW 20 MIN: CPT | Performed by: INTERNAL MEDICINE

## 2025-08-05 PROCEDURE — 99214 OFFICE O/P EST MOD 30 MIN: CPT | Performed by: INTERNAL MEDICINE

## 2025-08-05 PROCEDURE — G8427 DOCREV CUR MEDS BY ELIG CLIN: HCPCS | Performed by: INTERNAL MEDICINE

## 2025-08-05 PROCEDURE — 1036F TOBACCO NON-USER: CPT | Performed by: INTERNAL MEDICINE

## 2025-08-05 PROCEDURE — 1090F PRES/ABSN URINE INCON ASSESS: CPT | Performed by: INTERNAL MEDICINE

## 2025-08-05 PROCEDURE — G8417 CALC BMI ABV UP PARAM F/U: HCPCS | Performed by: INTERNAL MEDICINE

## 2025-08-05 RX ORDER — FLUTICASONE PROPIONATE AND SALMETEROL XINAFOATE 115; 21 UG/1; UG/1
2 AEROSOL, METERED RESPIRATORY (INHALATION) 2 TIMES DAILY
Qty: 1 EACH | Refills: 3 | Status: SHIPPED | OUTPATIENT
Start: 2025-08-05 | End: 2025-08-06

## 2025-08-05 RX ORDER — VIBEGRON 75 MG/1
75 TABLET, FILM COATED ORAL DAILY
COMMUNITY
Start: 2025-03-12 | End: 2026-03-12

## 2025-08-05 RX ORDER — GINSENG 100 MG
50 CAPSULE ORAL DAILY
COMMUNITY

## 2025-08-05 RX ORDER — FOLIC ACID 0.8 MG
800 TABLET ORAL DAILY
COMMUNITY

## 2025-08-05 RX ORDER — ALBUTEROL SULFATE 90 UG/1
2 INHALANT RESPIRATORY (INHALATION) 4 TIMES DAILY PRN
Qty: 18 G | Refills: 0 | Status: SHIPPED | OUTPATIENT
Start: 2025-08-05

## 2025-08-06 RX ORDER — FLUTICASONE FUROATE AND VILANTEROL 200; 25 UG/1; UG/1
1 POWDER RESPIRATORY (INHALATION)
Qty: 1 EACH | Refills: 4 | Status: SHIPPED | OUTPATIENT
Start: 2025-08-06

## 2025-08-15 ENCOUNTER — APPOINTMENT (OUTPATIENT)
Dept: PRIMARY CARE | Facility: CLINIC | Age: 80
End: 2025-08-15
Payer: MEDICARE

## 2025-08-15 VITALS
BODY MASS INDEX: 35.23 KG/M2 | HEART RATE: 76 BPM | TEMPERATURE: 98.7 F | OXYGEN SATURATION: 94 % | WEIGHT: 219.2 LBS | SYSTOLIC BLOOD PRESSURE: 130 MMHG | RESPIRATION RATE: 19 BRPM | DIASTOLIC BLOOD PRESSURE: 80 MMHG | HEIGHT: 66 IN

## 2025-08-15 DIAGNOSIS — F32.5 DEPRESSION, MAJOR, IN REMISSION: ICD-10-CM

## 2025-08-15 DIAGNOSIS — I10 ESSENTIAL HYPERTENSION: ICD-10-CM

## 2025-08-15 DIAGNOSIS — E66.01 OBESITY, MORBID (MULTI): ICD-10-CM

## 2025-08-15 DIAGNOSIS — E11.42 TYPE 2 DIABETES MELLITUS WITH DIABETIC POLYNEUROPATHY, UNSPECIFIED WHETHER LONG TERM INSULIN USE: ICD-10-CM

## 2025-08-15 DIAGNOSIS — E78.2 MIXED HYPERLIPIDEMIA: ICD-10-CM

## 2025-08-15 DIAGNOSIS — M85.80 OSTEOPENIA AFTER MENOPAUSE: ICD-10-CM

## 2025-08-15 DIAGNOSIS — Z63.4 GRIEF AT LOSS OF CHILD: ICD-10-CM

## 2025-08-15 DIAGNOSIS — Z78.0 OSTEOPENIA AFTER MENOPAUSE: ICD-10-CM

## 2025-08-15 DIAGNOSIS — Z00.00 MEDICARE ANNUAL WELLNESS VISIT, SUBSEQUENT: Primary | ICD-10-CM

## 2025-08-15 DIAGNOSIS — F43.21 GRIEF AT LOSS OF CHILD: ICD-10-CM

## 2025-08-15 LAB — POC HEMOGLOBIN A1C: 6.4 % (ref 4.2–6.5)

## 2025-08-15 PROCEDURE — G0444 DEPRESSION SCREEN ANNUAL: HCPCS | Performed by: FAMILY MEDICINE

## 2025-08-15 PROCEDURE — 1170F FXNL STATUS ASSESSED: CPT | Performed by: FAMILY MEDICINE

## 2025-08-15 PROCEDURE — G0439 PPPS, SUBSEQ VISIT: HCPCS | Performed by: FAMILY MEDICINE

## 2025-08-15 PROCEDURE — 1160F RVW MEDS BY RX/DR IN RCRD: CPT | Performed by: FAMILY MEDICINE

## 2025-08-15 PROCEDURE — 83036 HEMOGLOBIN GLYCOSYLATED A1C: CPT | Performed by: FAMILY MEDICINE

## 2025-08-15 PROCEDURE — 3075F SYST BP GE 130 - 139MM HG: CPT | Performed by: FAMILY MEDICINE

## 2025-08-15 PROCEDURE — 3288F FALL RISK ASSESSMENT DOCD: CPT | Performed by: FAMILY MEDICINE

## 2025-08-15 PROCEDURE — 1158F ADVNC CARE PLAN TLK DOCD: CPT | Performed by: FAMILY MEDICINE

## 2025-08-15 PROCEDURE — 1123F ACP DISCUSS/DSCN MKR DOCD: CPT | Performed by: FAMILY MEDICINE

## 2025-08-15 PROCEDURE — 1159F MED LIST DOCD IN RCRD: CPT | Performed by: FAMILY MEDICINE

## 2025-08-15 PROCEDURE — 3079F DIAST BP 80-89 MM HG: CPT | Performed by: FAMILY MEDICINE

## 2025-08-15 RX ORDER — ALBUTEROL SULFATE 90 UG/1
2 INHALANT RESPIRATORY (INHALATION) 4 TIMES DAILY PRN
COMMUNITY
Start: 2025-08-05

## 2025-08-15 RX ORDER — FLUTICASONE FUROATE AND VILANTEROL 200; 25 UG/1; UG/1
1 POWDER RESPIRATORY (INHALATION) DAILY
COMMUNITY
Start: 2025-08-06 | End: 2025-08-15 | Stop reason: WASHOUT

## 2025-08-15 RX ORDER — ATORVASTATIN CALCIUM 80 MG/1
80 TABLET, FILM COATED ORAL NIGHTLY
Qty: 90 TABLET | Refills: 3 | Status: SHIPPED | OUTPATIENT
Start: 2025-08-15

## 2025-08-15 RX ORDER — ESCITALOPRAM OXALATE 10 MG/1
10 TABLET ORAL 2 TIMES DAILY
Qty: 180 TABLET | Refills: 1 | Status: SHIPPED | OUTPATIENT
Start: 2025-08-15

## 2025-08-15 RX ORDER — MIRTAZAPINE 15 MG/1
15 TABLET, FILM COATED ORAL NIGHTLY
COMMUNITY
Start: 2025-05-21 | End: 2025-08-15 | Stop reason: WASHOUT

## 2025-08-15 RX ORDER — METOPROLOL TARTRATE 25 MG/1
25 TABLET, FILM COATED ORAL 2 TIMES DAILY
Qty: 180 TABLET | Refills: 3 | Status: SHIPPED | OUTPATIENT
Start: 2025-08-15

## 2025-08-15 SDOH — SOCIAL STABILITY - SOCIAL INSECURITY: DISSAPEARANCE AND DEATH OF FAMILY MEMBER: Z63.4

## 2025-08-15 ASSESSMENT — ACTIVITIES OF DAILY LIVING (ADL)
GROCERY_SHOPPING: INDEPENDENT
BATHING: INDEPENDENT
TAKING_MEDICATION: INDEPENDENT
DOING_HOUSEWORK: INDEPENDENT
MANAGING_FINANCES: INDEPENDENT
DRESSING: INDEPENDENT

## 2025-08-15 ASSESSMENT — PATIENT HEALTH QUESTIONNAIRE - PHQ9
2. FEELING DOWN, DEPRESSED OR HOPELESS: MORE THAN HALF THE DAYS
8. MOVING OR SPEAKING SO SLOWLY THAT OTHER PEOPLE COULD HAVE NOTICED. OR THE OPPOSITE, BEING SO FIGETY OR RESTLESS THAT YOU HAVE BEEN MOVING AROUND A LOT MORE THAN USUAL: NOT AT ALL
7. TROUBLE CONCENTRATING ON THINGS, SUCH AS READING THE NEWSPAPER OR WATCHING TELEVISION: NOT AT ALL
4. FEELING TIRED OR HAVING LITTLE ENERGY: SEVERAL DAYS
SUM OF ALL RESPONSES TO PHQ QUESTIONS 1-9: 4
SUM OF ALL RESPONSES TO PHQ9 QUESTIONS 1 AND 2: 2
9. THOUGHTS THAT YOU WOULD BE BETTER OFF DEAD, OR OF HURTING YOURSELF: NOT AT ALL
5. POOR APPETITE OR OVEREATING: NOT AT ALL
3. TROUBLE FALLING OR STAYING ASLEEP OR SLEEPING TOO MUCH: NOT AT ALL
6. FEELING BAD ABOUT YOURSELF - OR THAT YOU ARE A FAILURE OR HAVE LET YOURSELF OR YOUR FAMILY DOWN: SEVERAL DAYS
1. LITTLE INTEREST OR PLEASURE IN DOING THINGS: NOT AT ALL

## 2025-09-03 ENCOUNTER — TELEPHONE (OUTPATIENT)
Age: 80
End: 2025-09-03

## 2025-09-03 DIAGNOSIS — G47.33 OSA (OBSTRUCTIVE SLEEP APNEA): Primary | ICD-10-CM

## 2026-02-18 ENCOUNTER — APPOINTMENT (OUTPATIENT)
Dept: PHARMACY | Facility: HOSPITAL | Age: 81
End: 2026-02-18
Payer: MEDICARE

## (undated) DEVICE — GLOVE ORANGE PI 7   MSG9070

## (undated) DEVICE — GLOVE ORTHO 7 1/2   MSG9475

## (undated) DEVICE — DRESSING PETRO W3XL8IN OIL EMUL N ADH GZ KNIT IMPREG CELOS

## (undated) DEVICE — APPLICATOR MEDICATED 26 CC SOLUTION HI LT ORNG CHLORAPREP

## (undated) DEVICE — SPONGE GZ W4XL4IN COT 12 PLY TYP VII WVN C FLD DSGN STERILE

## (undated) DEVICE — HAND II: Brand: MEDLINE INDUSTRIES, INC.

## (undated) DEVICE — WRAP COHESIVE W2INXL5YD TAN SELF ADH BNDG HND NON STERILE TEAR CARING

## (undated) DEVICE — BANDAGE,GAUZE,BULKEE II,4.5"X4.1YD,STRL: Brand: MEDLINE

## (undated) DEVICE — GOWN,AURORA,NONREINFORCED,LARGE: Brand: MEDLINE

## (undated) DEVICE — SUTURE ETHLN SZ 4-0 L18IN NONABSORBABLE BLK L19MM PS-2 3/8 1667H